# Patient Record
Sex: FEMALE | Race: WHITE | Employment: OTHER | URBAN - METROPOLITAN AREA
[De-identification: names, ages, dates, MRNs, and addresses within clinical notes are randomized per-mention and may not be internally consistent; named-entity substitution may affect disease eponyms.]

---

## 2018-03-15 ENCOUNTER — HOSPITAL ENCOUNTER (OUTPATIENT)
Dept: SLEEP CENTER | Age: 63
Discharge: HOME | End: 2018-03-15
Payer: MEDICAID

## 2018-03-15 PROCEDURE — 95810 POLYSOM 6/> YRS 4/> PARAM: CPT

## 2018-03-21 ENCOUNTER — OFFICE VISIT (OUTPATIENT)
Dept: CARDIOLOGY CLINIC | Age: 63
End: 2018-03-21
Payer: MEDICAID

## 2018-03-21 VITALS
DIASTOLIC BLOOD PRESSURE: 60 MMHG | SYSTOLIC BLOOD PRESSURE: 110 MMHG | HEIGHT: 66 IN | WEIGHT: 224 LBS | HEART RATE: 55 BPM | BODY MASS INDEX: 36 KG/M2

## 2018-03-21 DIAGNOSIS — R00.1 SINUS BRADYCARDIA: ICD-10-CM

## 2018-03-21 DIAGNOSIS — I49.3 VENTRICULAR ECTOPY: ICD-10-CM

## 2018-03-21 DIAGNOSIS — R07.9 CHEST PAIN, UNSPECIFIED TYPE: Primary | ICD-10-CM

## 2018-03-21 DIAGNOSIS — I34.1 MITRAL PROLAPSE: ICD-10-CM

## 2018-03-21 PROCEDURE — 93000 ELECTROCARDIOGRAM COMPLETE: CPT | Performed by: NURSE PRACTITIONER

## 2018-03-21 PROCEDURE — 99213 OFFICE O/P EST LOW 20 MIN: CPT | Performed by: NURSE PRACTITIONER

## 2018-03-21 RX ORDER — SUCRALFATE 1 G/1
1 TABLET ORAL 4 TIMES DAILY
COMMUNITY
End: 2019-10-21

## 2018-03-21 NOTE — PROGRESS NOTES
mouth daily       venlafaxine (EFFEXOR XR) 150 MG extended release capsule Take 150 mg by mouth daily       polyethylene glycol (GLYCOLAX) packet Take 17 g by mouth daily as needed for Constipation      bisacodyl (DULCOLAX) 10 MG suppository Place 10 mg rectally daily as needed for Constipation      polyvinyl alcohol (ARTIFICIAL TEARS) 1.4 % ophthalmic solution 1 drop as needed      Bismuth Subsalicylate (PEPTO-BISMOL) 262 MG TABS Take by mouth      calcium carbonate (TUMS) 500 MG chewable tablet Take 1 tablet by mouth daily      acetaminophen (TYLENOL) 325 MG tablet Take 650 mg by mouth every 6 hours as needed for Pain      aluminum & magnesium hydroxide-simethicone (MAALOX) 367-196-78 MG/5ML SUSP suspension Take 5 mLs by mouth every 6 hours as needed for Indigestion      fluPHENAZine HCl (PROLIXIN) 10 MG tablet Take 10 mg by mouth 2 times daily Every morning      nitroGLYCERIN (NITROSTAT) 0.4 MG SL tablet Place 1 tablet under the tongue every 5 minutes as needed for Chest pain 25 tablet 3    gabapentin (NEURONTIN) 100 MG capsule Take 100 mg by mouth 3 times daily       benztropine (COGENTIN) 0.5 MG tablet Take 0.5 mg by mouth 3 times daily       vitamin D (CHOLECALCIFEROL) 1000 UNIT TABS tablet Take 50,000 Units by mouth once a week       Prenatal Vit-Fe Fumarate-FA (PRENATAL PLUS) 27-1 MG TABS Take 1 tablet by mouth daily. 30 tablet 0    levothyroxine (SYNTHROID) 25 MCG tablet TAKE 1 TABLET BY MOUTH DAILY. 30 tablet 4    topiramate (TOPAMAX) 100 MG tablet TAKE 1 TABLET BY MOUTH 2 TIMES DAILY. (Patient taking differently: bid) 60 tablet 4    docusate sodium (DOCQLACE) 100 MG capsule TAKE 1 CAPSULE BY MOUTH DAILY. (Patient taking differently: Take 100 mg by mouth 3 times daily as needed TAKE 1 CAPSULE BY MOUTH DAILY. ) 30 capsule 3    ranitidine (ZANTAC) 150 MG tablet Take 300 mg by mouth nightly       ARIPiprazole (ABILIFY) 30 MG tablet   Take 30 mg by mouth daily       pantoprazole (PROTONIX) Warm and dry, no rashes   Head & Neck:  No JVD. No carotid bruits. Mucous membranes moist.   Chest:   No deformities. Symmetrical and nontender. No respiratory distress   Lungs:   Clear to auscultation bilaterally. Heart:  Normal S1 and S2. No S3 or S4. No Murmur. No gallops no rubs   Abdomen: Soft without organomegaly or masses. Nontender, Bowel sound     normoactive   Extremities:  No edema of lower extremities . No cyanosis and moves all extremities   Neuro:  Alert & orient x 3 no focal deficits     REVIEW OF DIAGNOSTIC TESTS:      EKG today sinus  Bradycardia with low voltage    Lab Results   Component Value Date     08/02/2017     03/01/2017     09/06/2016    K 3.8 08/02/2017    K 4.2 03/01/2017    K 3.8 09/06/2016     08/02/2017     03/01/2017     09/06/2016    CO2 20 08/02/2017    CO2 24 03/01/2017    CO2 22 09/06/2016    BUN 12 08/02/2017    BUN 12 03/01/2017    BUN 15 09/06/2016    CREATININE 1.1 08/02/2017    CREATININE 1.0 03/01/2017    CREATININE 1.0 09/06/2016         Lab Results   Component Value Date    PROBNP 68 09/05/2016         ASSESSMENT / DIAGNOSIS:    Assessment:  1. History of atypical chest pain- with occurrence. No stress-induced ischemia on MPS, 02/17/2016, 3/2017  2. ENNIS, chronic, multifactorial   3. MVP anterior leaflet, mild mitral regurgitation   4. Sinus bradycardia and PVCs history  5. Hypothyroidism  6. Chronic back pain   7. Bipolar disorder   9. History of PE. S/p IVC filter with history of anticoagulation with Coumadin. Coumadin discontinued d/t rectal bleeding. 10. Abnormal EKG  11. Obstructive sleep apnea  12. Obesity  13. Diabetes  14. Sinus bradycardia, no indication for pacemaker     TREATMENT PLAN:   1. Continue current medications   2. Call for exertional chest pain that is consistent   3. Return to the office in one year   4. consider low-dose ACE inhibitor, statin,  Defer to primary care   5.  Avoid negative chronotropic meds    The patient's current medication list, allergies, problem list and results of all previously ordered tests were reviewed at today's visit    [address]  [facility] [address]  [phone]

## 2018-04-09 ENCOUNTER — HOSPITAL ENCOUNTER (OUTPATIENT)
Dept: MRI IMAGING | Age: 63
Discharge: HOME | End: 2018-04-11
Payer: MEDICAID

## 2018-04-09 DIAGNOSIS — M54.5 BILATERAL LOW BACK PAIN, UNSPECIFIED CHRONICITY, WITH SCIATICA PRESENCE UNSPECIFIED: ICD-10-CM

## 2018-04-09 PROCEDURE — 72149 MRI LUMBAR SPINE W/DYE: CPT

## 2018-04-09 PROCEDURE — 6360000004 HC RX CONTRAST MEDICATION: Performed by: RADIOLOGY

## 2018-04-09 PROCEDURE — A9579 GAD-BASE MR CONTRAST NOS,1ML: HCPCS | Performed by: RADIOLOGY

## 2018-04-09 RX ADMIN — GADOTERIDOL 20 ML: 279.3 INJECTION, SOLUTION INTRAVENOUS at 13:08

## 2018-04-11 ENCOUNTER — OFFICE VISIT (OUTPATIENT)
Dept: NEUROLOGY | Age: 63
End: 2018-04-11
Payer: MEDICAID

## 2018-04-11 VITALS
DIASTOLIC BLOOD PRESSURE: 64 MMHG | WEIGHT: 225 LBS | HEART RATE: 56 BPM | OXYGEN SATURATION: 98 % | BODY MASS INDEX: 36.16 KG/M2 | HEIGHT: 66 IN | SYSTOLIC BLOOD PRESSURE: 104 MMHG

## 2018-04-11 DIAGNOSIS — M54.17 L-S RADICULOPATHY: Primary | ICD-10-CM

## 2018-04-11 PROCEDURE — 99214 OFFICE O/P EST MOD 30 MIN: CPT | Performed by: CLINICAL NURSE SPECIALIST

## 2018-04-12 ENCOUNTER — HOSPITAL ENCOUNTER (OUTPATIENT)
Dept: SLEEP CENTER | Age: 63
Discharge: HOME | End: 2018-04-12
Payer: MEDICAID

## 2018-04-12 PROCEDURE — 95811 POLYSOM 6/>YRS CPAP 4/> PARM: CPT

## 2018-04-24 ENCOUNTER — TELEPHONE (OUTPATIENT)
Dept: NEUROSURGERY | Age: 63
End: 2018-04-24

## 2018-04-24 ENCOUNTER — OFFICE VISIT (OUTPATIENT)
Dept: NEUROSURGERY | Age: 63
End: 2018-04-24
Payer: MEDICAID

## 2018-04-24 VITALS
WEIGHT: 226 LBS | SYSTOLIC BLOOD PRESSURE: 118 MMHG | DIASTOLIC BLOOD PRESSURE: 71 MMHG | HEIGHT: 66 IN | BODY MASS INDEX: 36.32 KG/M2 | HEART RATE: 56 BPM

## 2018-04-24 DIAGNOSIS — M43.16 SPONDYLOLISTHESIS AT L3-L4 LEVEL: ICD-10-CM

## 2018-04-24 PROCEDURE — 99203 OFFICE O/P NEW LOW 30 MIN: CPT | Performed by: PHYSICIAN ASSISTANT

## 2018-05-11 ENCOUNTER — HOSPITAL ENCOUNTER (OUTPATIENT)
Dept: MRI IMAGING | Age: 63
Discharge: HOME | End: 2018-05-13
Payer: MEDICAID

## 2018-05-11 ENCOUNTER — HOSPITAL ENCOUNTER (OUTPATIENT)
Dept: GENERAL RADIOLOGY | Age: 63
Discharge: HOME | End: 2018-05-13
Payer: MEDICAID

## 2018-05-11 DIAGNOSIS — M43.16 SPONDYLOLISTHESIS AT L3-L4 LEVEL: ICD-10-CM

## 2018-05-11 PROCEDURE — 72120 X-RAY BEND ONLY L-S SPINE: CPT

## 2018-05-11 PROCEDURE — 73521 X-RAY EXAM HIPS BI 2 VIEWS: CPT

## 2018-05-11 PROCEDURE — 72148 MRI LUMBAR SPINE W/O DYE: CPT

## 2018-05-14 ENCOUNTER — TELEPHONE (OUTPATIENT)
Dept: NEUROSURGERY | Age: 63
End: 2018-05-14

## 2018-05-21 ENCOUNTER — HOSPITAL ENCOUNTER (OUTPATIENT)
Age: 63
Setting detail: OBSERVATION
Discharge: HOME HEALTH | End: 2018-05-22
Attending: EMERGENCY MEDICINE | Admitting: INTERNAL MEDICINE
Payer: MEDICAID

## 2018-05-21 ENCOUNTER — APPOINTMENT (OUTPATIENT)
Dept: GENERAL RADIOLOGY | Age: 63
End: 2018-05-21
Payer: MEDICAID

## 2018-05-21 DIAGNOSIS — R07.9 CHEST PAIN, UNSPECIFIED TYPE: Primary | ICD-10-CM

## 2018-05-21 LAB
ALBUMIN SERPL-MCNC: 3.9 G/DL (ref 3.5–5.2)
ALP BLD-CCNC: 65 U/L (ref 35–104)
ALT SERPL-CCNC: 16 U/L (ref 0–32)
ANION GAP SERPL CALCULATED.3IONS-SCNC: 15 MMOL/L (ref 7–16)
AST SERPL-CCNC: 20 U/L (ref 0–31)
BACTERIA: NORMAL /HPF
BASOPHILS ABSOLUTE: 0.01 E9/L (ref 0–0.2)
BASOPHILS RELATIVE PERCENT: 0.2 % (ref 0–2)
BILIRUB SERPL-MCNC: <0.2 MG/DL (ref 0–1.2)
BILIRUBIN URINE: NEGATIVE
BLOOD, URINE: NEGATIVE
BUN BLDV-MCNC: 13 MG/DL (ref 8–23)
CALCIUM SERPL-MCNC: 9.1 MG/DL (ref 8.6–10.2)
CHLORIDE BLD-SCNC: 107 MMOL/L (ref 98–107)
CLARITY: CLEAR
CO2: 23 MMOL/L (ref 22–29)
COLOR: YELLOW
CREAT SERPL-MCNC: 1 MG/DL (ref 0.5–1)
CRYSTALS, UA: NORMAL
EOSINOPHILS ABSOLUTE: 0.25 E9/L (ref 0.05–0.5)
EOSINOPHILS RELATIVE PERCENT: 4 % (ref 0–6)
EPITHELIAL CELLS, UA: NORMAL /HPF
GFR AFRICAN AMERICAN: >60
GFR NON-AFRICAN AMERICAN: 56 ML/MIN/1.73
GLUCOSE BLD-MCNC: 103 MG/DL (ref 74–109)
GLUCOSE URINE: NEGATIVE MG/DL
HCT VFR BLD CALC: 35.9 % (ref 34–48)
HEMOGLOBIN: 12.2 G/DL (ref 11.5–15.5)
IMMATURE GRANULOCYTES #: 0.02 E9/L
IMMATURE GRANULOCYTES %: 0.3 % (ref 0–5)
KETONES, URINE: NEGATIVE MG/DL
LEUKOCYTE ESTERASE, URINE: ABNORMAL
LYMPHOCYTES ABSOLUTE: 1.5 E9/L (ref 1.5–4)
LYMPHOCYTES RELATIVE PERCENT: 24 % (ref 20–42)
MCH RBC QN AUTO: 30.9 PG (ref 26–35)
MCHC RBC AUTO-ENTMCNC: 34 % (ref 32–34.5)
MCV RBC AUTO: 90.9 FL (ref 80–99.9)
MONOCYTES ABSOLUTE: 0.52 E9/L (ref 0.1–0.95)
MONOCYTES RELATIVE PERCENT: 8.3 % (ref 2–12)
NEUTROPHILS ABSOLUTE: 3.94 E9/L (ref 1.8–7.3)
NEUTROPHILS RELATIVE PERCENT: 63.2 % (ref 43–80)
NITRITE, URINE: NEGATIVE
PDW BLD-RTO: 13.6 FL (ref 11.5–15)
PH UA: 6 (ref 5–9)
PLATELET # BLD: 123 E9/L (ref 130–450)
PMV BLD AUTO: 10.8 FL (ref 7–12)
POTASSIUM SERPL-SCNC: 3.8 MMOL/L (ref 3.5–5)
PROTEIN UA: NEGATIVE MG/DL
RBC # BLD: 3.95 E12/L (ref 3.5–5.5)
RBC UA: NORMAL /HPF (ref 0–2)
SODIUM BLD-SCNC: 145 MMOL/L (ref 132–146)
SPECIFIC GRAVITY UA: 1.01 (ref 1–1.03)
TOTAL PROTEIN: 6.4 G/DL (ref 6.4–8.3)
TROPONIN: <0.01 NG/ML (ref 0–0.03)
TROPONIN: <0.01 NG/ML (ref 0–0.03)
UROBILINOGEN, URINE: 0.2 E.U./DL
WBC # BLD: 6.2 E9/L (ref 4.5–11.5)
WBC UA: NORMAL /HPF (ref 0–5)

## 2018-05-21 PROCEDURE — 99285 EMERGENCY DEPT VISIT HI MDM: CPT

## 2018-05-21 PROCEDURE — 84484 ASSAY OF TROPONIN QUANT: CPT

## 2018-05-21 PROCEDURE — G0378 HOSPITAL OBSERVATION PER HR: HCPCS

## 2018-05-21 PROCEDURE — 71045 X-RAY EXAM CHEST 1 VIEW: CPT

## 2018-05-21 PROCEDURE — 6360000002 HC RX W HCPCS: Performed by: FAMILY MEDICINE

## 2018-05-21 PROCEDURE — 6370000000 HC RX 637 (ALT 250 FOR IP): Performed by: FAMILY MEDICINE

## 2018-05-21 PROCEDURE — 94640 AIRWAY INHALATION TREATMENT: CPT

## 2018-05-21 PROCEDURE — 81001 URINALYSIS AUTO W/SCOPE: CPT

## 2018-05-21 PROCEDURE — 80053 COMPREHEN METABOLIC PANEL: CPT

## 2018-05-21 PROCEDURE — 85025 COMPLETE CBC W/AUTO DIFF WBC: CPT

## 2018-05-21 PROCEDURE — 87088 URINE BACTERIA CULTURE: CPT

## 2018-05-21 PROCEDURE — 2580000003 HC RX 258: Performed by: INTERNAL MEDICINE

## 2018-05-21 PROCEDURE — 36415 COLL VENOUS BLD VENIPUNCTURE: CPT

## 2018-05-21 PROCEDURE — 94761 N-INVAS EAR/PLS OXIMETRY MLT: CPT

## 2018-05-21 RX ORDER — VENLAFAXINE HYDROCHLORIDE 150 MG/1
150 CAPSULE, EXTENDED RELEASE ORAL DAILY
Status: DISCONTINUED | OUTPATIENT
Start: 2018-05-21 | End: 2018-05-22 | Stop reason: HOSPADM

## 2018-05-21 RX ORDER — FLUPHENAZINE HYDROCHLORIDE 5 MG/1
10 TABLET ORAL EVERY EVENING
Status: DISCONTINUED | OUTPATIENT
Start: 2018-05-21 | End: 2018-05-21

## 2018-05-21 RX ORDER — FAMOTIDINE 20 MG/1
20 TABLET, FILM COATED ORAL DAILY
Status: DISCONTINUED | OUTPATIENT
Start: 2018-05-21 | End: 2018-05-22 | Stop reason: HOSPADM

## 2018-05-21 RX ORDER — NITROGLYCERIN 0.4 MG/1
0.4 TABLET SUBLINGUAL EVERY 5 MIN PRN
Status: DISCONTINUED | OUTPATIENT
Start: 2018-05-21 | End: 2018-05-22 | Stop reason: HOSPADM

## 2018-05-21 RX ORDER — LANOLIN ALCOHOL/MO/W.PET/CERES
3 CREAM (GRAM) TOPICAL NIGHTLY PRN
Status: DISCONTINUED | OUTPATIENT
Start: 2018-05-21 | End: 2018-05-22 | Stop reason: HOSPADM

## 2018-05-21 RX ORDER — POLYETHYLENE GLYCOL 3350 17 G/17G
17 POWDER, FOR SOLUTION ORAL DAILY PRN
COMMUNITY
End: 2019-03-15 | Stop reason: ALTCHOICE

## 2018-05-21 RX ORDER — POLYVINYL ALCOHOL 14 MG/ML
1 SOLUTION/ DROPS OPHTHALMIC PRN
Status: DISCONTINUED | OUTPATIENT
Start: 2018-05-21 | End: 2018-05-22 | Stop reason: HOSPADM

## 2018-05-21 RX ORDER — SODIUM CHLORIDE 0.9 % (FLUSH) 0.9 %
10 SYRINGE (ML) INJECTION PRN
Status: DISCONTINUED | OUTPATIENT
Start: 2018-05-21 | End: 2018-05-22 | Stop reason: HOSPADM

## 2018-05-21 RX ORDER — ACETAMINOPHEN 325 MG/1
650 TABLET ORAL EVERY 6 HOURS PRN
Status: DISCONTINUED | OUTPATIENT
Start: 2018-05-21 | End: 2018-05-21

## 2018-05-21 RX ORDER — ACETAMINOPHEN 325 MG/1
650 TABLET ORAL EVERY 4 HOURS PRN
Status: DISCONTINUED | OUTPATIENT
Start: 2018-05-21 | End: 2018-05-21 | Stop reason: SDUPTHER

## 2018-05-21 RX ORDER — ACETAMINOPHEN 325 MG/1
650 TABLET ORAL EVERY 4 HOURS PRN
Status: DISCONTINUED | OUTPATIENT
Start: 2018-05-21 | End: 2018-05-22 | Stop reason: HOSPADM

## 2018-05-21 RX ORDER — ALBUTEROL SULFATE 2.5 MG/3ML
2.5 SOLUTION RESPIRATORY (INHALATION) 2 TIMES DAILY
Status: DISCONTINUED | OUTPATIENT
Start: 2018-05-21 | End: 2018-05-22 | Stop reason: HOSPADM

## 2018-05-21 RX ORDER — ALBUTEROL SULFATE 2.5 MG/3ML
2.5 SOLUTION RESPIRATORY (INHALATION) EVERY 6 HOURS PRN
COMMUNITY
End: 2018-06-01 | Stop reason: SDUPTHER

## 2018-05-21 RX ORDER — GABAPENTIN 100 MG/1
100 CAPSULE ORAL 3 TIMES DAILY
Status: DISCONTINUED | OUTPATIENT
Start: 2018-05-21 | End: 2018-05-22 | Stop reason: HOSPADM

## 2018-05-21 RX ORDER — SODIUM CHLORIDE 0.9 % (FLUSH) 0.9 %
10 SYRINGE (ML) INJECTION EVERY 12 HOURS SCHEDULED
Status: DISCONTINUED | OUTPATIENT
Start: 2018-05-21 | End: 2018-05-22 | Stop reason: HOSPADM

## 2018-05-21 RX ORDER — ONDANSETRON 2 MG/ML
4 INJECTION INTRAMUSCULAR; INTRAVENOUS EVERY 6 HOURS PRN
Status: DISCONTINUED | OUTPATIENT
Start: 2018-05-21 | End: 2018-05-22 | Stop reason: HOSPADM

## 2018-05-21 RX ORDER — TOPIRAMATE 100 MG/1
100 TABLET, FILM COATED ORAL 2 TIMES DAILY
Status: DISCONTINUED | OUTPATIENT
Start: 2018-05-21 | End: 2018-05-22 | Stop reason: HOSPADM

## 2018-05-21 RX ORDER — LANOLIN ALCOHOL/MO/W.PET/CERES
50 CREAM (GRAM) TOPICAL DAILY
Status: DISCONTINUED | OUTPATIENT
Start: 2018-05-21 | End: 2018-05-22 | Stop reason: HOSPADM

## 2018-05-21 RX ORDER — FLUPHENAZINE HYDROCHLORIDE 10 MG/1
25 TABLET ORAL NIGHTLY
COMMUNITY
End: 2018-06-01 | Stop reason: DRUGHIGH

## 2018-05-21 RX ORDER — LEVOTHYROXINE SODIUM 0.03 MG/1
25 TABLET ORAL DAILY
Status: DISCONTINUED | OUTPATIENT
Start: 2018-05-21 | End: 2018-05-22 | Stop reason: HOSPADM

## 2018-05-21 RX ORDER — ASPIRIN 81 MG/1
81 TABLET, CHEWABLE ORAL DAILY
Status: DISCONTINUED | OUTPATIENT
Start: 2018-05-21 | End: 2018-05-22 | Stop reason: HOSPADM

## 2018-05-21 RX ORDER — PANTOPRAZOLE SODIUM 40 MG/1
40 TABLET, DELAYED RELEASE ORAL DAILY
Status: DISCONTINUED | OUTPATIENT
Start: 2018-05-21 | End: 2018-05-22 | Stop reason: HOSPADM

## 2018-05-21 RX ORDER — FLUPHENAZINE HYDROCHLORIDE 5 MG/1
25 TABLET ORAL NIGHTLY
Status: DISCONTINUED | OUTPATIENT
Start: 2018-05-21 | End: 2018-05-22 | Stop reason: CLARIF

## 2018-05-21 RX ORDER — BENZTROPINE MESYLATE 0.5 MG/1
0.5 TABLET ORAL 3 TIMES DAILY
Status: DISCONTINUED | OUTPATIENT
Start: 2018-05-21 | End: 2018-05-22 | Stop reason: HOSPADM

## 2018-05-21 RX ORDER — OXYBUTYNIN CHLORIDE 5 MG/1
5 TABLET ORAL 3 TIMES DAILY
Status: DISCONTINUED | OUTPATIENT
Start: 2018-05-21 | End: 2018-05-22 | Stop reason: HOSPADM

## 2018-05-21 RX ORDER — ALBUTEROL SULFATE 90 UG/1
2 AEROSOL, METERED RESPIRATORY (INHALATION) EVERY 6 HOURS PRN
COMMUNITY
End: 2020-06-09

## 2018-05-21 RX ORDER — FLUPHENAZINE HYDROCHLORIDE 5 MG/1
10 TABLET ORAL DAILY
Status: DISCONTINUED | OUTPATIENT
Start: 2018-05-22 | End: 2018-05-22 | Stop reason: CLARIF

## 2018-05-21 RX ORDER — BISACODYL 10 MG
10 SUPPOSITORY, RECTAL RECTAL DAILY PRN
Status: DISCONTINUED | OUTPATIENT
Start: 2018-05-21 | End: 2018-05-22 | Stop reason: HOSPADM

## 2018-05-21 RX ORDER — LORAZEPAM 0.5 MG/1
0.5 TABLET ORAL 2 TIMES DAILY
Status: DISCONTINUED | OUTPATIENT
Start: 2018-05-21 | End: 2018-05-22 | Stop reason: HOSPADM

## 2018-05-21 RX ADMIN — FLUPHENAZINE HYDROCHLORIDE 25 MG: 5 TABLET ORAL at 23:29

## 2018-05-21 RX ADMIN — ALBUTEROL SULFATE 2.5 MG: 2.5 SOLUTION RESPIRATORY (INHALATION) at 18:12

## 2018-05-21 RX ADMIN — LORAZEPAM 0.5 MG: 0.5 TABLET ORAL at 22:24

## 2018-05-21 RX ADMIN — Medication 10 ML: at 22:28

## 2018-05-21 RX ADMIN — BENZTROPINE MESYLATE 0.5 MG: 0.5 TABLET ORAL at 22:25

## 2018-05-21 RX ADMIN — Medication 15 ML: at 22:24

## 2018-05-21 RX ADMIN — GABAPENTIN 100 MG: 100 CAPSULE ORAL at 22:24

## 2018-05-21 RX ADMIN — TOPIRAMATE 100 MG: 100 TABLET, FILM COATED ORAL at 22:24

## 2018-05-21 RX ADMIN — ACETAMINOPHEN 650 MG: 325 TABLET, FILM COATED ORAL at 23:31

## 2018-05-21 RX ADMIN — Medication 1 DROP: at 22:27

## 2018-05-21 RX ADMIN — OXYBUTYNIN CHLORIDE 5 MG: 5 TABLET ORAL at 22:25

## 2018-05-21 ASSESSMENT — ENCOUNTER SYMPTOMS
NAUSEA: 0
ABDOMINAL PAIN: 0
SHORTNESS OF BREATH: 1
EYE REDNESS: 0
VOMITING: 0

## 2018-05-21 ASSESSMENT — PAIN DESCRIPTION - LOCATION
LOCATION: CHEST
LOCATION: FLANK

## 2018-05-21 ASSESSMENT — PAIN SCALES - GENERAL
PAINLEVEL_OUTOF10: 0
PAINLEVEL_OUTOF10: 0
PAINLEVEL_OUTOF10: 5
PAINLEVEL_OUTOF10: 9

## 2018-05-21 ASSESSMENT — PAIN DESCRIPTION - ORIENTATION
ORIENTATION: RIGHT
ORIENTATION: LEFT

## 2018-05-21 ASSESSMENT — PAIN DESCRIPTION - DESCRIPTORS
DESCRIPTORS: PRESSURE;HEAVINESS;ACHING
DESCRIPTORS: ACHING;DULL;DISCOMFORT

## 2018-05-21 ASSESSMENT — PAIN DESCRIPTION - PAIN TYPE
TYPE: ACUTE PAIN
TYPE: ACUTE PAIN

## 2018-05-21 ASSESSMENT — PAIN DESCRIPTION - ONSET
ONSET: ON-GOING
ONSET: ON-GOING

## 2018-05-21 ASSESSMENT — PAIN DESCRIPTION - FREQUENCY
FREQUENCY: INTERMITTENT
FREQUENCY: INTERMITTENT

## 2018-05-21 ASSESSMENT — PAIN DESCRIPTION - PROGRESSION: CLINICAL_PROGRESSION: GRADUALLY WORSENING

## 2018-05-22 ENCOUNTER — APPOINTMENT (OUTPATIENT)
Dept: NUCLEAR MEDICINE | Age: 63
End: 2018-05-22
Payer: MEDICAID

## 2018-05-22 VITALS
BODY MASS INDEX: 37 KG/M2 | TEMPERATURE: 98.1 F | HEIGHT: 66 IN | DIASTOLIC BLOOD PRESSURE: 52 MMHG | WEIGHT: 230.2 LBS | HEART RATE: 52 BPM | RESPIRATION RATE: 16 BRPM | OXYGEN SATURATION: 97 % | SYSTOLIC BLOOD PRESSURE: 108 MMHG

## 2018-05-22 LAB
LV EF: 53 %
LV EF: 64 %
LVEF MODALITY: NORMAL
LVEF MODALITY: NORMAL
TROPONIN: <0.01 NG/ML (ref 0–0.03)

## 2018-05-22 PROCEDURE — G0378 HOSPITAL OBSERVATION PER HR: HCPCS

## 2018-05-22 PROCEDURE — 6360000002 HC RX W HCPCS: Performed by: INTERNAL MEDICINE

## 2018-05-22 PROCEDURE — 93306 TTE W/DOPPLER COMPLETE: CPT

## 2018-05-22 PROCEDURE — 93017 CV STRESS TEST TRACING ONLY: CPT

## 2018-05-22 PROCEDURE — 3430000000 HC RX DIAGNOSTIC RADIOPHARMACEUTICAL: Performed by: RADIOLOGY

## 2018-05-22 PROCEDURE — 78452 HT MUSCLE IMAGE SPECT MULT: CPT

## 2018-05-22 PROCEDURE — 96372 THER/PROPH/DIAG INJ SC/IM: CPT

## 2018-05-22 PROCEDURE — 94640 AIRWAY INHALATION TREATMENT: CPT

## 2018-05-22 PROCEDURE — 6370000000 HC RX 637 (ALT 250 FOR IP): Performed by: FAMILY MEDICINE

## 2018-05-22 PROCEDURE — A9500 TC99M SESTAMIBI: HCPCS | Performed by: RADIOLOGY

## 2018-05-22 PROCEDURE — 2580000003 HC RX 258: Performed by: INTERNAL MEDICINE

## 2018-05-22 RX ORDER — FLUPHENAZINE HYDROCHLORIDE 10 MG/1
10 TABLET ORAL DAILY
Status: DISCONTINUED | OUTPATIENT
Start: 2018-05-22 | End: 2018-05-22 | Stop reason: HOSPADM

## 2018-05-22 RX ORDER — FLUPHENAZINE HYDROCHLORIDE 10 MG/1
25 TABLET ORAL NIGHTLY
Status: DISCONTINUED | OUTPATIENT
Start: 2018-05-22 | End: 2018-05-22 | Stop reason: HOSPADM

## 2018-05-22 RX ADMIN — METFORMIN HYDROCHLORIDE 500 MG: 500 TABLET, FILM COATED ORAL at 13:13

## 2018-05-22 RX ADMIN — PANTOPRAZOLE SODIUM 40 MG: 40 TABLET, DELAYED RELEASE ORAL at 13:13

## 2018-05-22 RX ADMIN — GABAPENTIN 100 MG: 100 CAPSULE ORAL at 13:13

## 2018-05-22 RX ADMIN — OXYBUTYNIN CHLORIDE 5 MG: 5 TABLET ORAL at 13:14

## 2018-05-22 RX ADMIN — Medication 1 DROP: at 13:15

## 2018-05-22 RX ADMIN — Medication 10 MILLICURIE: at 07:22

## 2018-05-22 RX ADMIN — Medication 10 ML: at 13:20

## 2018-05-22 RX ADMIN — PYRIDOXINE HCL TAB 50 MG 50 MG: 50 TAB at 13:14

## 2018-05-22 RX ADMIN — BENZTROPINE MESYLATE 0.5 MG: 0.5 TABLET ORAL at 13:14

## 2018-05-22 RX ADMIN — ENOXAPARIN SODIUM 40 MG: 40 INJECTION SUBCUTANEOUS at 13:12

## 2018-05-22 RX ADMIN — ASPIRIN 81 MG 81 MG: 81 TABLET ORAL at 13:13

## 2018-05-22 RX ADMIN — FAMOTIDINE 20 MG: 20 TABLET, FILM COATED ORAL at 13:13

## 2018-05-22 RX ADMIN — ALBUTEROL SULFATE 2.5 MG: 2.5 SOLUTION RESPIRATORY (INHALATION) at 06:49

## 2018-05-22 RX ADMIN — FLUPHENAZINE HYDROCHLORIDE 10 MG: 10 TABLET ORAL at 13:15

## 2018-05-22 RX ADMIN — Medication 30 MILLICURIE: at 11:11

## 2018-05-22 RX ADMIN — VENLAFAXINE HYDROCHLORIDE 150 MG: 150 CAPSULE, EXTENDED RELEASE ORAL at 13:13

## 2018-05-22 RX ADMIN — LORAZEPAM 0.5 MG: 0.5 TABLET ORAL at 13:12

## 2018-05-22 RX ADMIN — REGADENOSON 0.4 MG: 0.08 INJECTION, SOLUTION INTRAVENOUS at 11:10

## 2018-05-22 RX ADMIN — TOPIRAMATE 100 MG: 100 TABLET, FILM COATED ORAL at 13:14

## 2018-05-22 ASSESSMENT — PAIN SCALES - GENERAL
PAINLEVEL_OUTOF10: 0

## 2018-05-23 ENCOUNTER — OFFICE VISIT (OUTPATIENT)
Dept: NEUROSURGERY | Age: 63
End: 2018-05-23
Payer: MEDICAID

## 2018-05-23 VITALS
SYSTOLIC BLOOD PRESSURE: 118 MMHG | WEIGHT: 230 LBS | HEART RATE: 63 BPM | BODY MASS INDEX: 36.96 KG/M2 | HEIGHT: 66 IN | DIASTOLIC BLOOD PRESSURE: 68 MMHG

## 2018-05-23 DIAGNOSIS — G89.29 CHRONIC MIDLINE LOW BACK PAIN WITH RIGHT-SIDED SCIATICA: Primary | ICD-10-CM

## 2018-05-23 DIAGNOSIS — M54.41 CHRONIC MIDLINE LOW BACK PAIN WITH RIGHT-SIDED SCIATICA: Primary | ICD-10-CM

## 2018-05-23 LAB — URINE CULTURE, ROUTINE: NORMAL

## 2018-05-23 PROCEDURE — 99213 OFFICE O/P EST LOW 20 MIN: CPT | Performed by: NEUROLOGICAL SURGERY

## 2018-05-24 ENCOUNTER — TELEPHONE (OUTPATIENT)
Dept: NEUROSURGERY | Age: 63
End: 2018-05-24

## 2018-06-01 ENCOUNTER — PREP FOR PROCEDURE (OUTPATIENT)
Dept: NEUROSURGERY | Age: 63
End: 2018-06-01

## 2018-06-01 ENCOUNTER — OFFICE VISIT (OUTPATIENT)
Dept: CARDIOLOGY CLINIC | Age: 63
End: 2018-06-01
Payer: MEDICAID

## 2018-06-01 VITALS
DIASTOLIC BLOOD PRESSURE: 74 MMHG | SYSTOLIC BLOOD PRESSURE: 130 MMHG | HEIGHT: 66 IN | HEART RATE: 51 BPM | WEIGHT: 230 LBS | BODY MASS INDEX: 36.96 KG/M2

## 2018-06-01 DIAGNOSIS — R07.89 OTHER CHEST PAIN: ICD-10-CM

## 2018-06-01 DIAGNOSIS — M48.061 DEGENERATIVE LUMBAR SPINAL STENOSIS: Primary | ICD-10-CM

## 2018-06-01 DIAGNOSIS — Z01.818 PREOPERATIVE CLEARANCE: Primary | ICD-10-CM

## 2018-06-01 DIAGNOSIS — R00.1 BRADYCARDIA: ICD-10-CM

## 2018-06-01 PROCEDURE — 99213 OFFICE O/P EST LOW 20 MIN: CPT | Performed by: NURSE PRACTITIONER

## 2018-06-01 PROCEDURE — 93000 ELECTROCARDIOGRAM COMPLETE: CPT | Performed by: INTERNAL MEDICINE

## 2018-06-01 RX ORDER — CHOLECALCIFEROL (VITAMIN D3) 1250 MCG
CAPSULE ORAL WEEKLY
COMMUNITY
End: 2019-03-15 | Stop reason: ALTCHOICE

## 2018-06-01 RX ORDER — ARIPIPRAZOLE 30 MG/1
30 TABLET ORAL DAILY
COMMUNITY
End: 2020-06-09 | Stop reason: ALTCHOICE

## 2018-06-01 RX ORDER — SODIUM CHLORIDE 0.9 % (FLUSH) 0.9 %
10 SYRINGE (ML) INJECTION PRN
Status: CANCELLED | OUTPATIENT
Start: 2018-06-01

## 2018-06-01 RX ORDER — SODIUM CHLORIDE 9 MG/ML
INJECTION, SOLUTION INTRAVENOUS CONTINUOUS
Status: CANCELLED | OUTPATIENT
Start: 2018-06-01

## 2018-06-01 RX ORDER — SODIUM CHLORIDE 0.9 % (FLUSH) 0.9 %
10 SYRINGE (ML) INJECTION EVERY 12 HOURS SCHEDULED
Status: CANCELLED | OUTPATIENT
Start: 2018-06-01

## 2018-06-11 ENCOUNTER — TELEPHONE (OUTPATIENT)
Dept: ENT CLINIC | Age: 63
End: 2018-06-11

## 2018-06-20 ENCOUNTER — PROCEDURE VISIT (OUTPATIENT)
Dept: AUDIOLOGY | Age: 63
End: 2018-06-20
Payer: MEDICAID

## 2018-06-20 ENCOUNTER — OFFICE VISIT (OUTPATIENT)
Dept: ENT CLINIC | Age: 63
End: 2018-06-20
Payer: MEDICAID

## 2018-06-20 VITALS
SYSTOLIC BLOOD PRESSURE: 120 MMHG | HEART RATE: 53 BPM | WEIGHT: 230.9 LBS | BODY MASS INDEX: 37.11 KG/M2 | HEIGHT: 66 IN | DIASTOLIC BLOOD PRESSURE: 65 MMHG

## 2018-06-20 DIAGNOSIS — H93.8X3 EAR PRESSURE, BILATERAL: Primary | ICD-10-CM

## 2018-06-20 DIAGNOSIS — H90.3 SENSORINEURAL HEARING LOSS (SNHL), BILATERAL: Primary | ICD-10-CM

## 2018-06-20 DIAGNOSIS — L29.9 ITCHING OF EAR: ICD-10-CM

## 2018-06-20 PROCEDURE — 99213 OFFICE O/P EST LOW 20 MIN: CPT | Performed by: OTOLARYNGOLOGY

## 2018-06-20 PROCEDURE — 92567 TYMPANOMETRY: CPT | Performed by: AUDIOLOGIST

## 2018-06-20 ASSESSMENT — ENCOUNTER SYMPTOMS
SHORTNESS OF BREATH: 0
COUGH: 0
DOUBLE VISION: 0
BLURRED VISION: 0
VOMITING: 0
HEARTBURN: 0

## 2018-06-21 ENCOUNTER — TELEPHONE (OUTPATIENT)
Dept: ENT CLINIC | Age: 63
End: 2018-06-21

## 2018-06-25 LAB
EKG ATRIAL RATE: 68 BPM
EKG P AXIS: 38 DEGREES
EKG P-R INTERVAL: 184 MS
EKG Q-T INTERVAL: 416 MS
EKG QRS DURATION: 94 MS
EKG QTC CALCULATION (BAZETT): 442 MS
EKG R AXIS: -31 DEGREES
EKG T AXIS: -7 DEGREES
EKG VENTRICULAR RATE: 68 BPM

## 2018-07-09 RX ORDER — FLUPHENAZINE HYDROCHLORIDE 10 MG/1
20 TABLET ORAL 2 TIMES DAILY
COMMUNITY

## 2018-07-09 RX ORDER — ACETAMINOPHEN 325 MG/1
650 TABLET ORAL EVERY 4 HOURS PRN
Status: ON HOLD | COMMUNITY
End: 2018-07-19 | Stop reason: HOSPADM

## 2018-07-09 RX ORDER — CALCIUM CARBONATE 200(500)MG
2 TABLET,CHEWABLE ORAL 4 TIMES DAILY PRN
COMMUNITY

## 2018-07-09 RX ORDER — NICOTINE POLACRILEX 4 MG
15 LOZENGE BUCCAL SEE ADMIN INSTRUCTIONS
COMMUNITY
End: 2019-03-15 | Stop reason: ALTCHOICE

## 2018-07-09 NOTE — PROGRESS NOTES
I TALKED TO [address]. ABE IS NOT IN ISOLATION,NO TRACH, VENT OR OPEN OR DRAINING WOUNDS. SHE SIGNS FOR HERSELF. [facility] WILL FAX OVER HT,WT,MEDICATIONS, PROBLEM LIST, LIVING WILL INFO AND WE WILL FAX OVER INSTRUCTIONS ONCE INFO IS RECEIVED. ALSO WILL FAX OVER LAB TESTS NEEDED FOR OR, EKG,CHEST ETC. INFO FROM [employer] RECEIVED AND REVIEWED. INSTRUCTIONS FAXED TO [facility] [employer]. NOTED FOR THEM TO CHECK ON ASPIRIN WITH SURGEON. LEFT MESSAGE ON [employer] VOICEMAIL THAT INSTRUCTIONS WERE SENT.  ASKED THAT THEY CALL BACK IF NOT RECEIVED OR IF ANY QUESTIONS

## 2018-07-10 ENCOUNTER — OFFICE VISIT (OUTPATIENT)
Dept: NEUROLOGY | Age: 63
End: 2018-07-10
Payer: MEDICAID

## 2018-07-10 VITALS
HEART RATE: 53 BPM | HEIGHT: 66 IN | SYSTOLIC BLOOD PRESSURE: 99 MMHG | OXYGEN SATURATION: 95 % | WEIGHT: 228 LBS | BODY MASS INDEX: 36.64 KG/M2 | DIASTOLIC BLOOD PRESSURE: 69 MMHG

## 2018-07-10 DIAGNOSIS — R41.3 MEMORY DIFFICULTIES: ICD-10-CM

## 2018-07-10 DIAGNOSIS — M54.17 L-S RADICULOPATHY: Primary | ICD-10-CM

## 2018-07-10 PROCEDURE — 99214 OFFICE O/P EST MOD 30 MIN: CPT | Performed by: CLINICAL NURSE SPECIALIST

## 2018-07-10 NOTE — PROGRESS NOTES
procedure, you may call the pre-op area if you have concerns about your blood sugar [phone]. [] Use your inhalers the morning of surgery. Bring your emergency inhaler with you day of surgery. [x] Follow physician instructions regarding any blood thinners you may be taking. WHAT TO EXPECT:  [x] The day of surgery you will be greeted and  checked in by the [employer] .  A nurse will greet you in accordance to the time you are needed in the pre-op area to prepare you for surgery. Please do not be discouraged if you are not greeted in the order you arrive as there are many variables that are involved in patient preparation. Your patience is greatly appreciated as you wait for your nurse. Please bring in items such as: books, magazines, newspapers, electronics, or any other items  to occupy your time in the waiting area. [x]  Delays may occur with surgery and staff will make a sincere effort to keep you informed of delays. If any delays occur with your procedure, we apologize ahead of time for your inconvenience as we recognize the value of your time.

## 2018-07-10 NOTE — PROGRESS NOTES
MD   benztropine (COGENTIN) 0.5 MG tablet Take 0.5 mg by mouth 3 times daily  Yes Historical Provider, MD   levothyroxine (SYNTHROID) 25 MCG tablet TAKE 1 TABLET BY MOUTH DAILY. Yes Cecily Lowery,    topiramate (TOPAMAX) 100 MG tablet TAKE 1 TABLET BY MOUTH 2 TIMES DAILY. Patient taking differently: bid Yes Delano Garay, DO   docusate sodium (DOCQLACE) 100 MG capsule TAKE 1 CAPSULE BY MOUTH DAILY. Patient taking differently: Take 100 mg by mouth daily TAKE 1 CAPSULE BY MOUTH DAILY. Yes Sirena Osorio, DO   ranitidine (ZANTAC) 150 MG tablet Take 300 mg by mouth nightly  Yes Historical Provider, MD   pantoprazole (PROTONIX) 40 MG tablet Take 40 mg by mouth daily. Take 1 pill every morning.  Given per Dr. Deena Erazo Yes Historical Provider, MD   glucose (GLUTOSE) 40 % GEL Take 15 g by mouth See Admin Instructions IF BLOOD SUGAR <60  Historical Provider, MD   Zinc Gluconate (COLD-EEZE PO) Take 1 lozenge by mouth every 2 hours as needed TIMES 1 DAY PRN SORE THROAT  Historical Provider, MD   Sodium Phosphates (FLEET) 7-19 GM/118ML Place 1 enema rectally once as needed If no bowel movement in 2 days ( IF NO DULCOLAX SUPP RESULTS)  Historical Provider, MD   Acetaminophen-Codeine (TYLENOL WITH CODEINE #3 PO) Take by mouth Pt takes 2 tabs four times daily as needed (NOT TO EXCEED 4 GMS APAP/24 HOURS  Historical Provider, MD   bisacodyl (DULCOLAX) 10 MG suppository Place 10 mg rectally daily as needed for Constipation IF NO MILK OF MAG RESULTS  Historical Provider, MD     Allergies as of 07/10/2018 - Review Complete 07/10/2018   Allergen Reaction Noted    Penicillins Anaphylaxis 11/21/2003    Demerol Other (See Comments) 01/25/2011    Depakote [divalproex sodium]  04/19/2012    Haldol [haloperidol lactate] Other (See Comments) 01/25/2011    Sulfa antibiotics Nausea And Vomiting 11/21/2003        Objective:     BP 99/69 (Site: Right Arm, Position: Sitting, Cuff Size: Large Adult)   Pulse 53   Ht 5' 6\"

## 2018-07-10 NOTE — PROGRESS NOTES
I TALKED TO DR HERRERA REGARDING PROLIXIN AND COGENTIN FOR DAY OF OR, HE SAID TO HAVE THEM TAKE AM OF OR.

## 2018-07-15 ENCOUNTER — ANESTHESIA EVENT (OUTPATIENT)
Dept: OPERATING ROOM | Age: 63
DRG: 304 | End: 2018-07-15
Payer: MEDICAID

## 2018-07-15 NOTE — H&P
History of Present Illness: This is a 59year old white female who presents with chronic back pain. Hx of lumbar laminectomy in 2009 with Dr. David Talavera, admits to good relief initially. Unable to give a time frame of when pain reoccurred. Patient has an extensive hx of back pain, hip pain, and leg pain with multiple surgeries. Patient is a poor historian when attempting to answer date and symptoms specific questioning with regards to these issues.      C/o 8/10 right sided back pain with isolated right \"femur pain\", bilateral knee pain s/p injections, and right hip pain s/p multiple surgeries. Patient denies true radicular pain. Denies LE n/t. C/o weakness, ambulating with a walker. Back pain worse then leg pain. Has tried PT 1.5 years prior, unsure if it was core/back targeted. States she has seen a Pain specialist, unsure of what type of injections and where they were located.      Patient denies recent follow-up with any of her previous surgeons locally or at [facility]. No family present during consultation.      Allergies:   Penicillins; Demerol; Depakote [divalproex sodium];  Haldol [haloperidol lactate]; and Sulfa antibiotics     Past Medical History:  Past Medical History             Diagnosis Date    Arrhythmia      Bipolar 2 disorder (HCC)      CKD      Constipation      Continuous urine leakage      COPD (chronic obstructive pulmonary disease) (HCC)      Depression      Dysphagia      Environmental allergies      GERD (gastroesophageal reflux disease)      History of cardiovascular stress test 10-     LEXISCAN    History of cardiovascular stress test 06/2014     lexiscan stress    Hyperlipidemia      MDRO (multiple drug resistant organisms) resistance       mrsa 3-4 yrs ago with back surgery    Menopausal symptoms       atrophic vaginitis    Mitral valve prolapse      Nausea & vomiting       with anesthesia    Neurogenic bladder      Obstructive sleep apnea      Overactive bladder      PONV

## 2018-07-16 ENCOUNTER — APPOINTMENT (OUTPATIENT)
Dept: GENERAL RADIOLOGY | Age: 63
DRG: 304 | End: 2018-07-16
Attending: NEUROLOGICAL SURGERY
Payer: MEDICAID

## 2018-07-16 ENCOUNTER — HOSPITAL ENCOUNTER (INPATIENT)
Age: 63
LOS: 3 days | Discharge: NURSING, REHAB OR LONG-TERM CARE | DRG: 304 | End: 2018-07-19
Attending: NEUROLOGICAL SURGERY | Admitting: NEUROLOGICAL SURGERY
Payer: MEDICAID

## 2018-07-16 ENCOUNTER — ANESTHESIA (OUTPATIENT)
Dept: OPERATING ROOM | Age: 63
DRG: 304 | End: 2018-07-16
Payer: MEDICAID

## 2018-07-16 VITALS — TEMPERATURE: 95 F | OXYGEN SATURATION: 100 % | RESPIRATION RATE: 12 BRPM

## 2018-07-16 DIAGNOSIS — Z01.812 PRE-OPERATIVE LABORATORY EXAMINATION: Primary | ICD-10-CM

## 2018-07-16 DIAGNOSIS — M54.5 LOW BACK PAIN, UNSPECIFIED BACK PAIN LATERALITY, UNSPECIFIED CHRONICITY, WITH SCIATICA PRESENCE UNSPECIFIED: ICD-10-CM

## 2018-07-16 DIAGNOSIS — M48.061 DEGENERATIVE LUMBAR SPINAL STENOSIS: ICD-10-CM

## 2018-07-16 PROBLEM — E11.9 TYPE 2 DIABETES MELLITUS, WITHOUT LONG-TERM CURRENT USE OF INSULIN (HCC): Status: ACTIVE | Noted: 2018-07-16

## 2018-07-16 LAB
ABO/RH: NORMAL
ANTIBODY SCREEN: NORMAL
GLUCOSE BLD-MCNC: 123 MG/DL
METER GLUCOSE: 125 MG/DL (ref 70–110)

## 2018-07-16 PROCEDURE — 3E0U0GB INTRODUCTION OF RECOMBINANT BONE MORPHOGENETIC PROTEIN INTO JOINTS, OPEN APPROACH: ICD-10-PCS | Performed by: NEUROLOGICAL SURGERY

## 2018-07-16 PROCEDURE — 22632 ARTHRD PST TQ 1NTRSPC LM EA: CPT | Performed by: PHYSICIAN ASSISTANT

## 2018-07-16 PROCEDURE — 94150 VITAL CAPACITY TEST: CPT

## 2018-07-16 PROCEDURE — 63047 LAM FACETEC & FORAMOT LUMBAR: CPT | Performed by: PHYSICIAN ASSISTANT

## 2018-07-16 PROCEDURE — 2500000003 HC RX 250 WO HCPCS: Performed by: NEUROLOGICAL SURGERY

## 2018-07-16 PROCEDURE — 6360000002 HC RX W HCPCS

## 2018-07-16 PROCEDURE — 63047 LAM FACETEC & FORAMOT LUMBAR: CPT | Performed by: NEUROLOGICAL SURGERY

## 2018-07-16 PROCEDURE — 6360000002 HC RX W HCPCS: Performed by: NURSE ANESTHETIST, CERTIFIED REGISTERED

## 2018-07-16 PROCEDURE — 3209999900 FLUORO FOR SURGICAL PROCEDURES

## 2018-07-16 PROCEDURE — 7100000000 HC PACU RECOVERY - FIRST 15 MIN: Performed by: NEUROLOGICAL SURGERY

## 2018-07-16 PROCEDURE — 2580000003 HC RX 258: Performed by: PHYSICIAN ASSISTANT

## 2018-07-16 PROCEDURE — 86850 RBC ANTIBODY SCREEN: CPT

## 2018-07-16 PROCEDURE — 2720000010 HC SURG SUPPLY STERILE: Performed by: NEUROLOGICAL SURGERY

## 2018-07-16 PROCEDURE — 3600000005 HC SURGERY LEVEL 5 BASE: Performed by: NEUROLOGICAL SURGERY

## 2018-07-16 PROCEDURE — 6370000000 HC RX 637 (ALT 250 FOR IP): Performed by: NEUROLOGICAL SURGERY

## 2018-07-16 PROCEDURE — 86900 BLOOD TYPING SEROLOGIC ABO: CPT

## 2018-07-16 PROCEDURE — 63048 LAM FACETEC &FORAMOT EA ADDL: CPT | Performed by: NEUROLOGICAL SURGERY

## 2018-07-16 PROCEDURE — L8699 PROSTHETIC IMPLANT NOS: HCPCS | Performed by: NEUROLOGICAL SURGERY

## 2018-07-16 PROCEDURE — 95910 NRV CNDJ TEST 7-8 STUDIES: CPT | Performed by: AUDIOLOGIST

## 2018-07-16 PROCEDURE — 22842 INSERT SPINE FIXATION DEVICE: CPT | Performed by: PHYSICIAN ASSISTANT

## 2018-07-16 PROCEDURE — 6360000002 HC RX W HCPCS: Performed by: NEUROLOGICAL SURGERY

## 2018-07-16 PROCEDURE — 2500000003 HC RX 250 WO HCPCS: Performed by: NURSE ANESTHETIST, CERTIFIED REGISTERED

## 2018-07-16 PROCEDURE — 0SG3071 FUSION OF LUMBOSACRAL JOINT WITH AUTOLOGOUS TISSUE SUBSTITUTE, POSTERIOR APPROACH, POSTERIOR COLUMN, OPEN APPROACH: ICD-10-PCS | Performed by: NEUROLOGICAL SURGERY

## 2018-07-16 PROCEDURE — 0SG30AJ FUSION OF LUMBOSACRAL JOINT WITH INTERBODY FUSION DEVICE, POSTERIOR APPROACH, ANTERIOR COLUMN, OPEN APPROACH: ICD-10-PCS | Performed by: NEUROLOGICAL SURGERY

## 2018-07-16 PROCEDURE — 0ST40ZZ RESECTION OF LUMBOSACRAL DISC, OPEN APPROACH: ICD-10-PCS | Performed by: NEUROLOGICAL SURGERY

## 2018-07-16 PROCEDURE — 2580000003 HC RX 258: Performed by: NEUROLOGICAL SURGERY

## 2018-07-16 PROCEDURE — 22842 INSERT SPINE FIXATION DEVICE: CPT | Performed by: NEUROLOGICAL SURGERY

## 2018-07-16 PROCEDURE — 2500000003 HC RX 250 WO HCPCS

## 2018-07-16 PROCEDURE — 7100000001 HC PACU RECOVERY - ADDTL 15 MIN: Performed by: NEUROLOGICAL SURGERY

## 2018-07-16 PROCEDURE — 1200000000 HC SEMI PRIVATE

## 2018-07-16 PROCEDURE — 95940 IONM IN OPERATNG ROOM 15 MIN: CPT | Performed by: AUDIOLOGIST

## 2018-07-16 PROCEDURE — 3700000001 HC ADD 15 MINUTES (ANESTHESIA): Performed by: NEUROLOGICAL SURGERY

## 2018-07-16 PROCEDURE — 2580000003 HC RX 258: Performed by: NURSE ANESTHETIST, CERTIFIED REGISTERED

## 2018-07-16 PROCEDURE — 2580000003 HC RX 258: Performed by: FAMILY MEDICINE

## 2018-07-16 PROCEDURE — 86901 BLOOD TYPING SEROLOGIC RH(D): CPT

## 2018-07-16 PROCEDURE — 22853 INSJ BIOMECHANICAL DEVICE: CPT | Performed by: PHYSICIAN ASSISTANT

## 2018-07-16 PROCEDURE — 63048 LAM FACETEC &FORAMOT EA ADDL: CPT | Performed by: PHYSICIAN ASSISTANT

## 2018-07-16 PROCEDURE — 88304 TISSUE EXAM BY PATHOLOGIST: CPT

## 2018-07-16 PROCEDURE — 22853 INSJ BIOMECHANICAL DEVICE: CPT | Performed by: NEUROLOGICAL SURGERY

## 2018-07-16 PROCEDURE — 36415 COLL VENOUS BLD VENIPUNCTURE: CPT

## 2018-07-16 PROCEDURE — 22632 ARTHRD PST TQ 1NTRSPC LM EA: CPT | Performed by: NEUROLOGICAL SURGERY

## 2018-07-16 PROCEDURE — 82962 GLUCOSE BLOOD TEST: CPT

## 2018-07-16 PROCEDURE — 22630 ARTHRD PST TQ 1NTRSPC LUM: CPT | Performed by: PHYSICIAN ASSISTANT

## 2018-07-16 PROCEDURE — 0ST20ZZ RESECTION OF LUMBAR VERTEBRAL DISC, OPEN APPROACH: ICD-10-PCS | Performed by: NEUROLOGICAL SURGERY

## 2018-07-16 PROCEDURE — C1713 ANCHOR/SCREW BN/BN,TIS/BN: HCPCS | Performed by: NEUROLOGICAL SURGERY

## 2018-07-16 PROCEDURE — 3700000000 HC ANESTHESIA ATTENDED CARE: Performed by: NEUROLOGICAL SURGERY

## 2018-07-16 PROCEDURE — 0SG10AJ FUSION OF 2 OR MORE LUMBAR VERTEBRAL JOINTS WITH INTERBODY FUSION DEVICE, POSTERIOR APPROACH, ANTERIOR COLUMN, OPEN APPROACH: ICD-10-PCS | Performed by: NEUROLOGICAL SURGERY

## 2018-07-16 PROCEDURE — 0SG1071 FUSION OF 2 OR MORE LUMBAR VERTEBRAL JOINTS WITH AUTOLOGOUS TISSUE SUBSTITUTE, POSTERIOR APPROACH, POSTERIOR COLUMN, OPEN APPROACH: ICD-10-PCS | Performed by: NEUROLOGICAL SURGERY

## 2018-07-16 PROCEDURE — 3600000015 HC SURGERY LEVEL 5 ADDTL 15MIN: Performed by: NEUROLOGICAL SURGERY

## 2018-07-16 PROCEDURE — 22630 ARTHRD PST TQ 1NTRSPC LUM: CPT | Performed by: NEUROLOGICAL SURGERY

## 2018-07-16 PROCEDURE — 94664 DEMO&/EVAL PT USE INHALER: CPT

## 2018-07-16 PROCEDURE — 6360000002 HC RX W HCPCS: Performed by: PHYSICIAN ASSISTANT

## 2018-07-16 DEVICE — SCREW 54840006545 MAS 6.5X45 CC
Type: IMPLANTABLE DEVICE | Status: FUNCTIONAL
Brand: CD HORIZON® SPINAL SYSTEM

## 2018-07-16 DEVICE — SCREW 54840006550 MAS 6.5X50 CC
Type: IMPLANTABLE DEVICE | Status: FUNCTIONAL
Brand: CD HORIZON® SPINAL SYSTEM

## 2018-07-16 DEVICE — BONE GRAFT KIT 7510800 INFUSE LARGE II
Type: IMPLANTABLE DEVICE | Status: FUNCTIONAL
Brand: INFUSE® BONE GRAFT

## 2018-07-16 DEVICE — SPACER 2990826 CAPSTONE PEEK 08X26
Type: IMPLANTABLE DEVICE | Status: FUNCTIONAL
Brand: CAPSTONE® SPINAL SYSTEM

## 2018-07-16 DEVICE — SCREW 54840005550 MAS 5.5X50 CC
Type: IMPLANTABLE DEVICE | Status: FUNCTIONAL
Brand: CD HORIZON® SPINAL SYSTEM

## 2018-07-16 DEVICE — SCREW 54840006540 MAS 6.5X40 CC
Type: IMPLANTABLE DEVICE | Status: FUNCTIONAL
Brand: CD HORIZON® SPINAL SYSTEM

## 2018-07-16 DEVICE — ROD 1475501070 4.75 CCM NS CURV 70MM
Type: IMPLANTABLE DEVICE | Status: FUNCTIONAL
Brand: CD HORIZON® SPINAL SYSTEM

## 2018-07-16 DEVICE — SET SCREW 5440030 4.75 TI NS BRK OFF
Type: IMPLANTABLE DEVICE | Status: FUNCTIONAL
Brand: CD HORIZON® SPINAL SYSTEM

## 2018-07-16 DEVICE — GRAFT BNE SUB 30CC 1.7-10MM CANC CHIP MORSELIZED FRZ DRY: Type: IMPLANTABLE DEVICE | Status: FUNCTIONAL

## 2018-07-16 RX ORDER — LOPERAMIDE HYDROCHLORIDE 2 MG/1
2 CAPSULE ORAL PRN
Status: DISCONTINUED | OUTPATIENT
Start: 2018-07-16 | End: 2018-07-19 | Stop reason: HOSPADM

## 2018-07-16 RX ORDER — SODIUM CHLORIDE 0.9 % (FLUSH) 0.9 %
10 SYRINGE (ML) INJECTION PRN
Status: DISCONTINUED | OUTPATIENT
Start: 2018-07-16 | End: 2018-07-16 | Stop reason: HOSPADM

## 2018-07-16 RX ORDER — DOCUSATE SODIUM 100 MG/1
100 CAPSULE, LIQUID FILLED ORAL 2 TIMES DAILY
Status: DISCONTINUED | OUTPATIENT
Start: 2018-07-16 | End: 2018-07-19 | Stop reason: HOSPADM

## 2018-07-16 RX ORDER — NEOSTIGMINE METHYLSULFATE 0.5 MG/ML
INJECTION, SOLUTION INTRAVENOUS PRN
Status: DISCONTINUED | OUTPATIENT
Start: 2018-07-16 | End: 2018-07-16 | Stop reason: SDUPTHER

## 2018-07-16 RX ORDER — FENTANYL CITRATE 50 UG/ML
INJECTION, SOLUTION INTRAMUSCULAR; INTRAVENOUS PRN
Status: DISCONTINUED | OUTPATIENT
Start: 2018-07-16 | End: 2018-07-16 | Stop reason: SDUPTHER

## 2018-07-16 RX ORDER — EPHEDRINE SULFATE/0.9% NACL/PF 50 MG/5 ML
SYRINGE (ML) INTRAVENOUS PRN
Status: DISCONTINUED | OUTPATIENT
Start: 2018-07-16 | End: 2018-07-16 | Stop reason: SDUPTHER

## 2018-07-16 RX ORDER — CYCLOBENZAPRINE HCL 10 MG
10 TABLET ORAL 3 TIMES DAILY PRN
Status: DISCONTINUED | OUTPATIENT
Start: 2018-07-16 | End: 2018-07-19 | Stop reason: HOSPADM

## 2018-07-16 RX ORDER — ALBUTEROL SULFATE 90 UG/1
2 AEROSOL, METERED RESPIRATORY (INHALATION) EVERY 6 HOURS PRN
Status: DISCONTINUED | OUTPATIENT
Start: 2018-07-16 | End: 2018-07-19 | Stop reason: HOSPADM

## 2018-07-16 RX ORDER — HYDROCODONE BITARTRATE AND ACETAMINOPHEN 5; 325 MG/1; MG/1
2 TABLET ORAL EVERY 4 HOURS PRN
Status: DISCONTINUED | OUTPATIENT
Start: 2018-07-16 | End: 2018-07-17

## 2018-07-16 RX ORDER — MORPHINE SULFATE 4 MG/ML
4 INJECTION, SOLUTION INTRAMUSCULAR; INTRAVENOUS
Status: DISCONTINUED | OUTPATIENT
Start: 2018-07-16 | End: 2018-07-19 | Stop reason: HOSPADM

## 2018-07-16 RX ORDER — ACETAMINOPHEN 325 MG/1
650 TABLET ORAL EVERY 4 HOURS PRN
Status: DISCONTINUED | OUTPATIENT
Start: 2018-07-16 | End: 2018-07-19 | Stop reason: HOSPADM

## 2018-07-16 RX ORDER — GABAPENTIN 100 MG/1
100 CAPSULE ORAL 3 TIMES DAILY
Status: DISCONTINUED | OUTPATIENT
Start: 2018-07-16 | End: 2018-07-19 | Stop reason: HOSPADM

## 2018-07-16 RX ORDER — SODIUM CHLORIDE 9 MG/ML
INJECTION, SOLUTION INTRAVENOUS CONTINUOUS
Status: DISCONTINUED | OUTPATIENT
Start: 2018-07-16 | End: 2018-07-16

## 2018-07-16 RX ORDER — SODIUM CHLORIDE 0.9 % (FLUSH) 0.9 %
10 SYRINGE (ML) INJECTION EVERY 12 HOURS SCHEDULED
Status: DISCONTINUED | OUTPATIENT
Start: 2018-07-16 | End: 2018-07-19 | Stop reason: HOSPADM

## 2018-07-16 RX ORDER — FAMOTIDINE 20 MG/1
20 TABLET, FILM COATED ORAL DAILY
Status: DISCONTINUED | OUTPATIENT
Start: 2018-07-16 | End: 2018-07-19 | Stop reason: HOSPADM

## 2018-07-16 RX ORDER — ALBUTEROL SULFATE 2.5 MG/3ML
2.5 SOLUTION RESPIRATORY (INHALATION) 2 TIMES DAILY
Status: DISCONTINUED | OUTPATIENT
Start: 2018-07-16 | End: 2018-07-19 | Stop reason: HOSPADM

## 2018-07-16 RX ORDER — FENTANYL CITRATE 50 UG/ML
25 INJECTION, SOLUTION INTRAMUSCULAR; INTRAVENOUS EVERY 5 MIN PRN
Status: DISCONTINUED | OUTPATIENT
Start: 2018-07-16 | End: 2018-07-16 | Stop reason: HOSPADM

## 2018-07-16 RX ORDER — LORAZEPAM 0.5 MG/1
0.5 TABLET ORAL 2 TIMES DAILY
Status: DISCONTINUED | OUTPATIENT
Start: 2018-07-16 | End: 2018-07-19 | Stop reason: HOSPADM

## 2018-07-16 RX ORDER — VANCOMYCIN HYDROCHLORIDE 500 MG/10ML
INJECTION, POWDER, LYOPHILIZED, FOR SOLUTION INTRAVENOUS PRN
Status: DISCONTINUED | OUTPATIENT
Start: 2018-07-16 | End: 2018-07-16 | Stop reason: HOSPADM

## 2018-07-16 RX ORDER — GLYCOPYRROLATE 1 MG/5 ML
SYRINGE (ML) INTRAVENOUS PRN
Status: DISCONTINUED | OUTPATIENT
Start: 2018-07-16 | End: 2018-07-16 | Stop reason: SDUPTHER

## 2018-07-16 RX ORDER — SODIUM CHLORIDE 0.9 % (FLUSH) 0.9 %
10 SYRINGE (ML) INJECTION PRN
Status: DISCONTINUED | OUTPATIENT
Start: 2018-07-16 | End: 2018-07-19 | Stop reason: HOSPADM

## 2018-07-16 RX ORDER — BUPIVACAINE HYDROCHLORIDE 2.5 MG/ML
INJECTION, SOLUTION EPIDURAL; INFILTRATION; INTRACAUDAL PRN
Status: DISCONTINUED | OUTPATIENT
Start: 2018-07-16 | End: 2018-07-16 | Stop reason: HOSPADM

## 2018-07-16 RX ORDER — MIDAZOLAM HYDROCHLORIDE 1 MG/ML
INJECTION INTRAMUSCULAR; INTRAVENOUS PRN
Status: DISCONTINUED | OUTPATIENT
Start: 2018-07-16 | End: 2018-07-16 | Stop reason: SDUPTHER

## 2018-07-16 RX ORDER — HYDROCODONE BITARTRATE AND ACETAMINOPHEN 5; 325 MG/1; MG/1
1 TABLET ORAL EVERY 4 HOURS PRN
Status: DISCONTINUED | OUTPATIENT
Start: 2018-07-16 | End: 2018-07-17

## 2018-07-16 RX ORDER — MAGNESIUM HYDROXIDE/ALUMINUM HYDROXICE/SIMETHICONE 120; 1200; 1200 MG/30ML; MG/30ML; MG/30ML
30 SUSPENSION ORAL EVERY 4 HOURS PRN
Status: DISCONTINUED | OUTPATIENT
Start: 2018-07-16 | End: 2018-07-19 | Stop reason: HOSPADM

## 2018-07-16 RX ORDER — POLYETHYLENE GLYCOL 3350 17 G/17G
17 POWDER, FOR SOLUTION ORAL DAILY PRN
Status: DISCONTINUED | OUTPATIENT
Start: 2018-07-16 | End: 2018-07-19 | Stop reason: HOSPADM

## 2018-07-16 RX ORDER — ACETAMINOPHEN 325 MG/1
650 TABLET ORAL EVERY 4 HOURS PRN
Status: DISCONTINUED | OUTPATIENT
Start: 2018-07-16 | End: 2018-07-16 | Stop reason: SDUPTHER

## 2018-07-16 RX ORDER — FLUPHENAZINE HYDROCHLORIDE 5 MG/1
10 TABLET ORAL DAILY
Status: DISCONTINUED | OUTPATIENT
Start: 2018-07-16 | End: 2018-07-19 | Stop reason: HOSPADM

## 2018-07-16 RX ORDER — OXYBUTYNIN CHLORIDE 5 MG/1
5 TABLET ORAL 3 TIMES DAILY
Status: DISCONTINUED | OUTPATIENT
Start: 2018-07-16 | End: 2018-07-19 | Stop reason: HOSPADM

## 2018-07-16 RX ORDER — LEVOTHYROXINE SODIUM 0.03 MG/1
25 TABLET ORAL DAILY
Status: DISCONTINUED | OUTPATIENT
Start: 2018-07-16 | End: 2018-07-19 | Stop reason: HOSPADM

## 2018-07-16 RX ORDER — MORPHINE SULFATE 2 MG/ML
2 INJECTION, SOLUTION INTRAMUSCULAR; INTRAVENOUS
Status: DISCONTINUED | OUTPATIENT
Start: 2018-07-16 | End: 2018-07-19 | Stop reason: HOSPADM

## 2018-07-16 RX ORDER — ONDANSETRON 2 MG/ML
4 INJECTION INTRAMUSCULAR; INTRAVENOUS EVERY 6 HOURS PRN
Status: DISCONTINUED | OUTPATIENT
Start: 2018-07-16 | End: 2018-07-19 | Stop reason: HOSPADM

## 2018-07-16 RX ORDER — ROCURONIUM BROMIDE 10 MG/ML
INJECTION, SOLUTION INTRAVENOUS PRN
Status: DISCONTINUED | OUTPATIENT
Start: 2018-07-16 | End: 2018-07-16 | Stop reason: SDUPTHER

## 2018-07-16 RX ORDER — BENZTROPINE MESYLATE 0.5 MG/1
0.5 TABLET ORAL 3 TIMES DAILY
Status: DISCONTINUED | OUTPATIENT
Start: 2018-07-16 | End: 2018-07-19 | Stop reason: HOSPADM

## 2018-07-16 RX ORDER — ONDANSETRON 2 MG/ML
INJECTION INTRAMUSCULAR; INTRAVENOUS PRN
Status: DISCONTINUED | OUTPATIENT
Start: 2018-07-16 | End: 2018-07-16 | Stop reason: SDUPTHER

## 2018-07-16 RX ORDER — LIDOCAINE HYDROCHLORIDE AND EPINEPHRINE 5; 5 MG/ML; UG/ML
INJECTION, SOLUTION INFILTRATION; PERINEURAL PRN
Status: DISCONTINUED | OUTPATIENT
Start: 2018-07-16 | End: 2018-07-16 | Stop reason: HOSPADM

## 2018-07-16 RX ORDER — FENTANYL CITRATE 50 UG/ML
50 INJECTION, SOLUTION INTRAMUSCULAR; INTRAVENOUS EVERY 5 MIN PRN
Status: DISCONTINUED | OUTPATIENT
Start: 2018-07-16 | End: 2018-07-16 | Stop reason: HOSPADM

## 2018-07-16 RX ORDER — PROPOFOL 10 MG/ML
INJECTION, EMULSION INTRAVENOUS PRN
Status: DISCONTINUED | OUTPATIENT
Start: 2018-07-16 | End: 2018-07-16 | Stop reason: SDUPTHER

## 2018-07-16 RX ORDER — NITROGLYCERIN 0.4 MG/1
0.4 TABLET SUBLINGUAL EVERY 5 MIN PRN
Status: DISCONTINUED | OUTPATIENT
Start: 2018-07-16 | End: 2018-07-19 | Stop reason: HOSPADM

## 2018-07-16 RX ORDER — DEXAMETHASONE SODIUM PHOSPHATE 4 MG/ML
INJECTION, SOLUTION INTRA-ARTICULAR; INTRALESIONAL; INTRAMUSCULAR; INTRAVENOUS; SOFT TISSUE PRN
Status: DISCONTINUED | OUTPATIENT
Start: 2018-07-16 | End: 2018-07-16 | Stop reason: SDUPTHER

## 2018-07-16 RX ORDER — BISACODYL 10 MG
10 SUPPOSITORY, RECTAL RECTAL DAILY PRN
Status: DISCONTINUED | OUTPATIENT
Start: 2018-07-16 | End: 2018-07-19 | Stop reason: HOSPADM

## 2018-07-16 RX ORDER — DIPHENHYDRAMINE HYDROCHLORIDE 50 MG/ML
12.5 INJECTION INTRAMUSCULAR; INTRAVENOUS
Status: DISCONTINUED | OUTPATIENT
Start: 2018-07-16 | End: 2018-07-16 | Stop reason: HOSPADM

## 2018-07-16 RX ORDER — CALCIUM CARBONATE 200(500)MG
2 TABLET,CHEWABLE ORAL 4 TIMES DAILY PRN
Status: DISCONTINUED | OUTPATIENT
Start: 2018-07-16 | End: 2018-07-19 | Stop reason: HOSPADM

## 2018-07-16 RX ORDER — POLYVINYL ALCOHOL 14 MG/ML
1 SOLUTION/ DROPS OPHTHALMIC PRN
Status: DISCONTINUED | OUTPATIENT
Start: 2018-07-16 | End: 2018-07-19 | Stop reason: HOSPADM

## 2018-07-16 RX ORDER — OXYCODONE HYDROCHLORIDE AND ACETAMINOPHEN 5; 325 MG/1; MG/1
1 TABLET ORAL
Status: DISCONTINUED | OUTPATIENT
Start: 2018-07-16 | End: 2018-07-16 | Stop reason: HOSPADM

## 2018-07-16 RX ORDER — LUBIPROSTONE 24 UG/1
24 CAPSULE, GELATIN COATED ORAL
Status: DISCONTINUED | OUTPATIENT
Start: 2018-07-17 | End: 2018-07-19 | Stop reason: HOSPADM

## 2018-07-16 RX ORDER — ARIPIPRAZOLE 15 MG/1
30 TABLET ORAL DAILY
Status: DISCONTINUED | OUTPATIENT
Start: 2018-07-16 | End: 2018-07-19 | Stop reason: HOSPADM

## 2018-07-16 RX ORDER — LANOLIN ALCOHOL/MO/W.PET/CERES
3 CREAM (GRAM) TOPICAL NIGHTLY
Status: DISCONTINUED | OUTPATIENT
Start: 2018-07-16 | End: 2018-07-19 | Stop reason: HOSPADM

## 2018-07-16 RX ORDER — CALCIUM POLYCARBOPHIL 625 MG 625 MG/1
625 TABLET ORAL DAILY
Status: DISCONTINUED | OUTPATIENT
Start: 2018-07-16 | End: 2018-07-19 | Stop reason: HOSPADM

## 2018-07-16 RX ORDER — 0.9 % SODIUM CHLORIDE 0.9 %
1000 INTRAVENOUS SOLUTION INTRAVENOUS ONCE
Status: COMPLETED | OUTPATIENT
Start: 2018-07-16 | End: 2018-07-17

## 2018-07-16 RX ORDER — SODIUM CHLORIDE 0.9 % (FLUSH) 0.9 %
10 SYRINGE (ML) INJECTION EVERY 12 HOURS SCHEDULED
Status: DISCONTINUED | OUTPATIENT
Start: 2018-07-16 | End: 2018-07-16 | Stop reason: HOSPADM

## 2018-07-16 RX ORDER — FLUPHENAZINE HYDROCHLORIDE 5 MG/1
25 TABLET ORAL NIGHTLY
Status: DISCONTINUED | OUTPATIENT
Start: 2018-07-16 | End: 2018-07-19 | Stop reason: HOSPADM

## 2018-07-16 RX ORDER — TOPIRAMATE 100 MG/1
100 TABLET, FILM COATED ORAL 2 TIMES DAILY
Status: DISCONTINUED | OUTPATIENT
Start: 2018-07-16 | End: 2018-07-19 | Stop reason: HOSPADM

## 2018-07-16 RX ORDER — VENLAFAXINE HYDROCHLORIDE 150 MG/1
150 CAPSULE, EXTENDED RELEASE ORAL DAILY
Status: DISCONTINUED | OUTPATIENT
Start: 2018-07-16 | End: 2018-07-19 | Stop reason: HOSPADM

## 2018-07-16 RX ADMIN — ALBUTEROL SULFATE 2.5 MG: 2.5 SOLUTION RESPIRATORY (INHALATION) at 20:56

## 2018-07-16 RX ADMIN — SODIUM CHLORIDE 1000 ML: 9 INJECTION, SOLUTION INTRAVENOUS at 18:43

## 2018-07-16 RX ADMIN — DEXAMETHASONE SODIUM PHOSPHATE 10 MG: 4 INJECTION, SOLUTION INTRAMUSCULAR; INTRAVENOUS at 06:49

## 2018-07-16 RX ADMIN — PROPOFOL 150 MG: 10 INJECTION, EMULSION INTRAVENOUS at 06:47

## 2018-07-16 RX ADMIN — LEVOTHYROXINE SODIUM 25 MCG: 25 TABLET ORAL at 16:33

## 2018-07-16 RX ADMIN — FENTANYL CITRATE 50 MCG: 50 INJECTION, SOLUTION INTRAMUSCULAR; INTRAVENOUS at 11:01

## 2018-07-16 RX ADMIN — PHENYLEPHRINE HYDROCHLORIDE 80 MCG: 10 INJECTION INTRAMUSCULAR; INTRAVENOUS; SUBCUTANEOUS at 08:05

## 2018-07-16 RX ADMIN — MIDAZOLAM 2 MG: 1 INJECTION INTRAMUSCULAR; INTRAVENOUS at 06:35

## 2018-07-16 RX ADMIN — CEFAZOLIN SODIUM 2 G: 2 SOLUTION INTRAVENOUS at 22:37

## 2018-07-16 RX ADMIN — VENLAFAXINE HYDROCHLORIDE 150 MG: 150 CAPSULE, EXTENDED RELEASE ORAL at 16:33

## 2018-07-16 RX ADMIN — SODIUM CHLORIDE: 9 INJECTION, SOLUTION INTRAVENOUS at 08:00

## 2018-07-16 RX ADMIN — FENTANYL CITRATE 50 MCG: 50 INJECTION, SOLUTION INTRAMUSCULAR; INTRAVENOUS at 10:50

## 2018-07-16 RX ADMIN — NEOSTIGMINE METHYLSULFATE 3 MG: 0.5 INJECTION INTRAVENOUS at 10:40

## 2018-07-16 RX ADMIN — ONDANSETRON 4 MG: 2 INJECTION INTRAMUSCULAR; INTRAVENOUS at 10:30

## 2018-07-16 RX ADMIN — ARIPIPRAZOLE 30 MG: 15 TABLET ORAL at 16:34

## 2018-07-16 RX ADMIN — LIDOCAINE HYDROCHLORIDE 40 MG: 20 INJECTION, SOLUTION INTRAVENOUS at 06:47

## 2018-07-16 RX ADMIN — LORAZEPAM 0.5 MG: 0.5 TABLET ORAL at 16:34

## 2018-07-16 RX ADMIN — CEFAZOLIN SODIUM 2 G: 2 SOLUTION INTRAVENOUS at 06:49

## 2018-07-16 RX ADMIN — TOPIRAMATE 100 MG: 100 TABLET, FILM COATED ORAL at 22:28

## 2018-07-16 RX ADMIN — DOCUSATE SODIUM 100 MG: 100 CAPSULE, LIQUID FILLED ORAL at 22:28

## 2018-07-16 RX ADMIN — FLUPHENAZINE HYDROCHLORIDE 25 MG: 5 TABLET, FILM COATED ORAL at 22:28

## 2018-07-16 RX ADMIN — Medication 10 MG: at 07:53

## 2018-07-16 RX ADMIN — FLUPHENAZINE HYDROCHLORIDE 10 MG: 5 TABLET, FILM COATED ORAL at 16:34

## 2018-07-16 RX ADMIN — CEFAZOLIN SODIUM 2 G: 2 SOLUTION INTRAVENOUS at 14:51

## 2018-07-16 RX ADMIN — TOPIRAMATE 100 MG: 100 TABLET, FILM COATED ORAL at 16:33

## 2018-07-16 RX ADMIN — BENZTROPINE MESYLATE 0.5 MG: 0.5 TABLET ORAL at 16:34

## 2018-07-16 RX ADMIN — BENZTROPINE MESYLATE 0.5 MG: 0.5 TABLET ORAL at 22:28

## 2018-07-16 RX ADMIN — MELATONIN 3 MG ORAL TABLET 3 MG: 3 TABLET ORAL at 22:28

## 2018-07-16 RX ADMIN — GABAPENTIN 100 MG: 100 CAPSULE ORAL at 22:28

## 2018-07-16 RX ADMIN — OXYBUTYNIN CHLORIDE 5 MG: 5 TABLET ORAL at 22:28

## 2018-07-16 RX ADMIN — FENTANYL CITRATE 100 MCG: 50 INJECTION, SOLUTION INTRAMUSCULAR; INTRAVENOUS at 06:47

## 2018-07-16 RX ADMIN — MORPHINE SULFATE 2 MG: 2 INJECTION, SOLUTION INTRAMUSCULAR; INTRAVENOUS at 14:47

## 2018-07-16 RX ADMIN — DOCUSATE SODIUM 100 MG: 100 CAPSULE, LIQUID FILLED ORAL at 16:34

## 2018-07-16 RX ADMIN — OXYBUTYNIN CHLORIDE 5 MG: 5 TABLET ORAL at 16:33

## 2018-07-16 RX ADMIN — ROCURONIUM BROMIDE 50 MG: 10 INJECTION, SOLUTION INTRAVENOUS at 06:47

## 2018-07-16 RX ADMIN — MORPHINE SULFATE 4 MG: 4 INJECTION INTRAVENOUS at 18:48

## 2018-07-16 RX ADMIN — CALCIUM POLYCARBOPHIL 625 MG: 625 TABLET, FILM COATED ORAL at 16:33

## 2018-07-16 RX ADMIN — Medication 10 ML: at 18:48

## 2018-07-16 RX ADMIN — Medication 0.6 MG: at 10:40

## 2018-07-16 RX ADMIN — SODIUM CHLORIDE: 9 INJECTION, SOLUTION INTRAVENOUS at 06:16

## 2018-07-16 ASSESSMENT — PULMONARY FUNCTION TESTS
PIF_VALUE: 23
PIF_VALUE: 25
PIF_VALUE: 24
PIF_VALUE: 25
PIF_VALUE: 25
PIF_VALUE: 22
PIF_VALUE: 25
PIF_VALUE: 25
PIF_VALUE: 22
PIF_VALUE: 23
PIF_VALUE: 23
PIF_VALUE: 24
PIF_VALUE: 23
PIF_VALUE: 22
PIF_VALUE: 23
PIF_VALUE: 23
PIF_VALUE: 24
PIF_VALUE: 23
PIF_VALUE: 20
PIF_VALUE: 22
PIF_VALUE: 23
PIF_VALUE: 25
PIF_VALUE: 23
PIF_VALUE: 23
PIF_VALUE: 1
PIF_VALUE: 23
PIF_VALUE: 20
PIF_VALUE: 22
PIF_VALUE: 24
PIF_VALUE: 23
PIF_VALUE: 25
PIF_VALUE: 20
PIF_VALUE: 24
PIF_VALUE: 23
PIF_VALUE: 23
PIF_VALUE: 25
PIF_VALUE: 4
PIF_VALUE: 24
PIF_VALUE: 23
PIF_VALUE: 2
PIF_VALUE: 23
PIF_VALUE: 24
PIF_VALUE: 24
PIF_VALUE: 23
PIF_VALUE: 24
PIF_VALUE: 25
PIF_VALUE: 26
PIF_VALUE: 25
PIF_VALUE: 24
PIF_VALUE: 24
PIF_VALUE: 23
PIF_VALUE: 12
PIF_VALUE: 24
PIF_VALUE: 4
PIF_VALUE: 24
PIF_VALUE: 23
PIF_VALUE: 23
PIF_VALUE: 25
PIF_VALUE: 23
PIF_VALUE: 5
PIF_VALUE: 26
PIF_VALUE: 24
PIF_VALUE: 4
PIF_VALUE: 23
PIF_VALUE: 4
PIF_VALUE: 25
PIF_VALUE: 23
PIF_VALUE: 23
PIF_VALUE: 24
PIF_VALUE: 25
PIF_VALUE: 24
PIF_VALUE: 25
PIF_VALUE: 23
PIF_VALUE: 23
PIF_VALUE: 26
PIF_VALUE: 24
PIF_VALUE: 4
PIF_VALUE: 24
PIF_VALUE: 5
PIF_VALUE: 23
PIF_VALUE: 26
PIF_VALUE: 3
PIF_VALUE: 25
PIF_VALUE: 26
PIF_VALUE: 23
PIF_VALUE: 23
PIF_VALUE: 24
PIF_VALUE: 23
PIF_VALUE: 25
PIF_VALUE: 23
PIF_VALUE: 23
PIF_VALUE: 24
PIF_VALUE: 24
PIF_VALUE: 23
PIF_VALUE: 24
PIF_VALUE: 25
PIF_VALUE: 22
PIF_VALUE: 8
PIF_VALUE: 1
PIF_VALUE: 24
PIF_VALUE: 25
PIF_VALUE: 21
PIF_VALUE: 23
PIF_VALUE: 24
PIF_VALUE: 24
PIF_VALUE: 23
PIF_VALUE: 2
PIF_VALUE: 25
PIF_VALUE: 26
PIF_VALUE: 4
PIF_VALUE: 3
PIF_VALUE: 25
PIF_VALUE: 23
PIF_VALUE: 24
PIF_VALUE: 4
PIF_VALUE: 24
PIF_VALUE: 23
PIF_VALUE: 24
PIF_VALUE: 25
PIF_VALUE: 3
PIF_VALUE: 23
PIF_VALUE: 23
PIF_VALUE: 25
PIF_VALUE: 23
PIF_VALUE: 24
PIF_VALUE: 23
PIF_VALUE: 26
PIF_VALUE: 23
PIF_VALUE: 25
PIF_VALUE: 24
PIF_VALUE: 23
PIF_VALUE: 25
PIF_VALUE: 28
PIF_VALUE: 3
PIF_VALUE: 24
PIF_VALUE: 24
PIF_VALUE: 21
PIF_VALUE: 24
PIF_VALUE: 3
PIF_VALUE: 28
PIF_VALUE: 24
PIF_VALUE: 23
PIF_VALUE: 25
PIF_VALUE: 25
PIF_VALUE: 2
PIF_VALUE: 24
PIF_VALUE: 22
PIF_VALUE: 25
PIF_VALUE: 23
PIF_VALUE: 24
PIF_VALUE: 25
PIF_VALUE: 24
PIF_VALUE: 25
PIF_VALUE: 23
PIF_VALUE: 24
PIF_VALUE: 22
PIF_VALUE: 24
PIF_VALUE: 21
PIF_VALUE: 21
PIF_VALUE: 24
PIF_VALUE: 24
PIF_VALUE: 25
PIF_VALUE: 24
PIF_VALUE: 23
PIF_VALUE: 25
PIF_VALUE: 23
PIF_VALUE: 12
PIF_VALUE: 25
PIF_VALUE: 24
PIF_VALUE: 23
PIF_VALUE: 1
PIF_VALUE: 23
PIF_VALUE: 23
PIF_VALUE: 24
PIF_VALUE: 25
PIF_VALUE: 25
PIF_VALUE: 23
PIF_VALUE: 3
PIF_VALUE: 24
PIF_VALUE: 25
PIF_VALUE: 23
PIF_VALUE: 2
PIF_VALUE: 23
PIF_VALUE: 4
PIF_VALUE: 24
PIF_VALUE: 23
PIF_VALUE: 25
PIF_VALUE: 24
PIF_VALUE: 21
PIF_VALUE: 25
PIF_VALUE: 26
PIF_VALUE: 25
PIF_VALUE: 25
PIF_VALUE: 24
PIF_VALUE: 23
PIF_VALUE: 24
PIF_VALUE: 24
PIF_VALUE: 23
PIF_VALUE: 23
PIF_VALUE: 24
PIF_VALUE: 25
PIF_VALUE: 24
PIF_VALUE: 28
PIF_VALUE: 25

## 2018-07-16 ASSESSMENT — PAIN DESCRIPTION - ONSET: ONSET: ON-GOING

## 2018-07-16 ASSESSMENT — PAIN SCALES - GENERAL
PAINLEVEL_OUTOF10: 0
PAINLEVEL_OUTOF10: 0
PAINLEVEL_OUTOF10: 6
PAINLEVEL_OUTOF10: 0
PAINLEVEL_OUTOF10: 9
PAINLEVEL_OUTOF10: 0
PAINLEVEL_OUTOF10: 6
PAINLEVEL_OUTOF10: 0

## 2018-07-16 ASSESSMENT — PAIN DESCRIPTION - PAIN TYPE
TYPE: SURGICAL PAIN
TYPE: SURGICAL PAIN

## 2018-07-16 ASSESSMENT — PAIN DESCRIPTION - ORIENTATION: ORIENTATION: RIGHT;LEFT;LOWER;MID

## 2018-07-16 ASSESSMENT — PAIN DESCRIPTION - DESCRIPTORS
DESCRIPTORS: ACHING;DISCOMFORT;SORE
DESCRIPTORS: ACHING;DISCOMFORT

## 2018-07-16 ASSESSMENT — PAIN - FUNCTIONAL ASSESSMENT: PAIN_FUNCTIONAL_ASSESSMENT: 0-10

## 2018-07-16 ASSESSMENT — PAIN DESCRIPTION - PROGRESSION: CLINICAL_PROGRESSION: GRADUALLY WORSENING

## 2018-07-16 ASSESSMENT — PAIN DESCRIPTION - FREQUENCY: FREQUENCY: CONTINUOUS

## 2018-07-16 ASSESSMENT — PAIN DESCRIPTION - LOCATION: LOCATION: BACK

## 2018-07-16 NOTE — PROGRESS NOTES
Occupational Therapy          OT consult received and appreciated. Chart reviewed. Will hold evaluation due to LSO brace ordered . Will evaluate at a later time after brace has arrived. Thank you. Vignesh Marquez.  Mario Alberto 73, [address]

## 2018-07-16 NOTE — CONSULTS
Hospital Medicine  Consult History & Physical        Chief Complaint:    Medical management    Date of Service:   7/16/2018    History Of Present Illness:      59 y.o. female who we are asked to see/evaluate by Libra Myrick MD for medical management. Admitted to neurosurgery following posterior lumbar fusion L3-S1. She had previous laminectomy in 2009 which provided some relief at the time. She has failed conservative therapy with PT and pain management with injections. She has PMH of bipolar, CKD, COPD, PVD, hypothyroid, HLD, seizures, EMILEE, DM. She has history of MRSA after her last back surgery.  [facility] consulted for medical management    Past Medical History:        Diagnosis Date    Anxiety     Arrhythmia     Bipolar 2 disorder ([address]     Chronic back pain     CKD     Constipation     Continuous urine leakage     COPD (chronic obstructive pulmonary disease) (HCC)     Depression     Dysphagia     Environmental allergies     GERD (gastroesophageal reflux disease)     History of cardiovascular stress test 10-    LEXISCAN    History of cardiovascular stress test 06/2014    lexiscan stress    History of cardiovascular stress test 05/22/2018    Lexiscan stress test    Hyperlipidemia     MDRO (multiple drug resistant organisms) resistance     mrsa 3-4 yrs ago with back surgery    Menopausal symptoms     atrophic vaginitis    Mitral valve prolapse     Nausea & vomiting     with anesthesia    Neurogenic bladder     Obstructive sleep apnea     Overactive bladder     PONV (postoperative nausea and vomiting)     Pulmonary embolism (HCC)     few yrs ago has vena cava filter    PVD (peripheral vascular disease) (HCC)     Schizoaffective disorder (HCC)     Seizure disorder (HCC)     stares    Seizures ([address]     Thyroid disease     x30 years    Type 2 diabetes mellitus without complication ([address]        Past Surgical History:        Procedure Laterality Date    APPENDECTOMY      BACK SURGERY      BLADDER SURGERY      multiple   d/t  incontinence    BREAST SURGERY      bx neg lt    COLONOSCOPY      CYST REMOVAL  2006    vulvular sebaceous cyst excision    DIAGNOSTIC CARDIAC CATH LAB PROCEDURE  1996/1997    showed \"mitral valve prolapse\", records not available     ECHO COMPL W DOP COLOR FLOW  10/10/2012         ENDOSCOPY, COLON, DIAGNOSTIC      HAMMER TOE SURGERY      lt    HIP SURGERY       screws and rods     HYSTERECTOMY      JOINT REPLACEMENT      rt hip & revision    KNEE ARTHROSCOPY  10-26-12    left    OTHER SURGICAL HISTORY      supra pubic tubing for 1 1/2 yrs then removed    OTHER SURGICAL HISTORY Bilateral JULY 11, 2013    BILAT MAXILLARY ANTROSTOMY,BILATERAL  INFERIOR TURBINATE CAUTERY AND LATERALIZATION,ENDOSCOPIC EXCISION BILATERAL MIDDLE TURBINATE ELVIS BULLOSA     OTHER SURGICAL HISTORY Left 7-22-15    left knee arthroscopy partial lateral menisectomy chrondroplasty partial synovectomy     TONSILLECTOMY      UPPER GASTROINTESTINAL ENDOSCOPY      VENA CAVA FILTER PLACEMENT         Medications Prior to Admission:      Prior to Admission medications    Medication Sig Start Date End Date Taking?  Authorizing Provider   acetaminophen (TYLENOL) 325 MG tablet Take 650 mg by mouth every 4 hours as needed for Pain or Fever MAX 4 GMS IN 24 HOURS   Yes Historical Provider, MD   Calcium & Magnesium Carbonates (MYLANTA PO) Take 20 mLs by mouth every 4 hours as needed GI DISTRESS   Yes Historical Provider, MD   Dextromethorphan-Guaifenesin (ROBITUSSIN DM PO) Take 10 mLs by mouth every 4 hours as needed   Yes Historical Provider, MD   magnesium hydroxide (MILK OF MAGNESIA) 400 MG/5ML suspension Take 30 mLs by mouth daily as needed for Constipation IF NO BM 3 DAYS   Yes Historical Provider, MD   Bismuth Subsalicylate (PEPTO-BISMOL PO) Take 30 mLs by mouth EVERY HOUR PRN DIARRHEA/UPSET STOMACH MAX 8 DOSES IN 24 HOURS   Yes Historical Provider, MD   glucose TO 4 TIMES PER DAY AS NEEDED FOR COUGH, WHEEZING OR SHORTNESS OF BREATH   Yes Historical Provider, MD   polycarbophil (FIBERCON) 625 MG tablet Take 625 mg by mouth daily WITH GLASS OF WATER   Yes Historical Provider, MD   polyethyl glycol-propyl glycol 0.4-0.3 % (SYSTANE) 0.4-0.3 % ophthalmic solution Place 1 drop into both eyes 4 times daily    Yes Historical Provider, MD   Sodium Phosphates (FLEET) 7-19 GM/118ML Place 1 enema rectally once as needed If no bowel movement in 2 days ( IF NO DULCOLAX SUPP RESULTS)   Yes Historical Provider, MD   LORazepam (ATIVAN) 0.5 MG tablet Take 0.5 mg by mouth 2 times daily. PRN. Yes Historical Provider, MD   melatonin 3 MG TABS tablet Take 3 mg by mouth nightly    Yes Historical Provider, MD   lubiprostone (AMITIZA) 24 MCG capsule Take 24 mcg by mouth daily (with breakfast)   Yes Historical Provider, MD   Acetaminophen-Codeine (TYLENOL WITH CODEINE #3 PO) Take by mouth Pt takes 2 tabs four times daily as needed (NOT TO EXCEED 4 GMS APAP/24 HOURS   Yes Historical Provider, MD   oxybutynin (DITROPAN) 5 MG tablet Take 5 mg by mouth 3 times daily   Yes Historical Provider, MD   loperamide (IMODIUM) 2 MG capsule Take 2 mg by mouth as needed for Diarrhea 1 CAPSULE AFTER EACH LOOSE STOOL AS NEEDED FOR DIARRHEA.  MAX 8 CAPS/DAY   Yes Historical Provider, MD   ASPIRIN LOW DOSE 81 MG chewable tablet Take 81 mg by mouth daily  10/5/16  Yes Historical Provider, MD   venlafaxine (EFFEXOR XR) 150 MG extended release capsule Take 150 mg by mouth daily  10/26/16  Yes Historical Provider, MD   bisacodyl (DULCOLAX) 10 MG suppository Place 10 mg rectally daily as needed for Constipation IF NO MILK OF MAG RESULTS   Yes Historical Provider, MD   polyvinyl alcohol (ARTIFICIAL TEARS) 1.4 % ophthalmic solution Place 1 drop into both eyes as needed    Yes Historical Provider, MD   fluPHENAZine HCl (PROLIXIN) 10 MG tablet Take 10 mg by mouth daily Every morning   Yes Historical Provider, MD

## 2018-07-16 NOTE — PROGRESS NOTES
Eucrisa oint. 2% is non-formulary and will not be dispensed by the pharmacy at this time. Please have patient bring in home supply of medication and deliver it to pharmacy for identification and barcode. If patient has not supplied medication by 1400 on 07/17/18, please notify pharmacy.  Ayala Tavarez, PharmPATRICK 7/16/2018 2:19 PM

## 2018-07-16 NOTE — BRIEF OP NOTE
N/A 3   IMPL SPINE CROW PRECUT SOLERA 70MM Spine IMPL SPINE CROW PRECUT SOLERA 70MM  [employer] [employer]  N/A 2   SET SCREW SOLERA Spine SET SCREW SOLERA   [employer] [employer]   N/A 8         Drains:   Closed/Suction Drain Posterior Back Accordion 10 [place] (Active)       Urethral Catheter Non-latex (Active)       Findings: see dictated op note    Aaliyah Christy MD  Date: 7/16/2018  Time: 10:47 AM

## 2018-07-16 NOTE — ANESTHESIA PRE PROCEDURE
Historical Provider, MD   fluPHENAZine HCl (PROLIXIN) 10 MG tablet Take 25 mg by mouth nightly GIVE 2.5 TABLETS OF THE 10 MG TO EQUAL 25 MG   Yes Historical Provider, MD   Crisaborole 2 % OINT Apply 1 drop topically daily 6/20/18  Yes Jennifer Baker DO   Cholecalciferol (VITAMIN D3) 60893 units CAPS Take by mouth once a week SATURDAY   Yes Historical Provider, MD   ARIPiprazole (ABILIFY) 30 MG tablet Take 30 mg by mouth daily   Yes Historical Provider, MD   Prenatal Vit-Fe Fumarate-FA (VIRT BALA) 28-1 MG TABS Take 1 tablet by mouth daily   Yes Historical Provider, MD   metFORMIN (GLUCOPHAGE) 500 MG tablet Take 500 mg by mouth daily (with breakfast)   Yes Historical Provider, MD   polyethylene glycol (GLYCOLAX) packet Take 17 g by mouth daily as needed for Constipation IN 8 OZ WATER/JUICE/SODA/COFFEE/TEA AND DRINK BY MOUTH DAILY AS NEEDED FOR CONSTIPATION   Yes Historical Provider, MD   albuterol sulfate  (90 Base) MCG/ACT inhaler Inhale 2 puffs into the lungs every 6 hours as needed for Wheezing or Shortness of Breath UP TO 4 TIMES A DAY   Yes Historical Provider, MD   sucralfate (CARAFATE) 1 GM tablet Take 1 g by mouth 4 times daily   Yes Historical Provider, MD   albuterol (PROVENTIL) (2.5 MG/3ML) 0.083% nebulizer solution Take 2.5 mg by nebulization 2 times daily UP TO 4 TIMES PER DAY AS NEEDED FOR COUGH, WHEEZING OR SHORTNESS OF BREATH   Yes Historical Provider, MD   polycarbophil (FIBERCON) 625 MG tablet Take 625 mg by mouth daily WITH GLASS OF WATER   Yes Historical Provider, MD   polyethyl glycol-propyl glycol 0.4-0.3 % (SYSTANE) 0.4-0.3 % ophthalmic solution Place 1 drop into both eyes 4 times daily    Yes Historical Provider, MD   Sodium Phosphates (FLEET) 7-19 GM/118ML Place 1 enema rectally once as needed If no bowel movement in 2 days ( IF NO DULCOLAX SUPP RESULTS)   Yes Historical Provider, MD   LORazepam (ATIVAN) 0.5 MG tablet Take 0.5 mg by mouth 2 times daily. PRN.    Yes Historical Provider, MD   melatonin 3 MG TABS tablet Take 3 mg by mouth nightly    Yes Historical Provider, MD   lubiprostone (AMITIZA) 24 MCG capsule Take 24 mcg by mouth daily (with breakfast)   Yes Historical Provider, MD   Acetaminophen-Codeine (TYLENOL WITH CODEINE #3 PO) Take by mouth Pt takes 2 tabs four times daily as needed (NOT TO EXCEED 4 GMS APAP/24 HOURS   Yes Historical Provider, MD   oxybutynin (DITROPAN) 5 MG tablet Take 5 mg by mouth 3 times daily   Yes Historical Provider, MD   loperamide (IMODIUM) 2 MG capsule Take 2 mg by mouth as needed for Diarrhea 1 CAPSULE AFTER EACH LOOSE STOOL AS NEEDED FOR DIARRHEA. MAX 8 CAPS/DAY   Yes Historical Provider, MD   ASPIRIN LOW DOSE 81 MG chewable tablet Take 81 mg by mouth daily  10/5/16  Yes Historical Provider, MD   venlafaxine (EFFEXOR XR) 150 MG extended release capsule Take 150 mg by mouth daily  10/26/16  Yes Historical Provider, MD   bisacodyl (DULCOLAX) 10 MG suppository Place 10 mg rectally daily as needed for Constipation IF NO MILK OF MAG RESULTS   Yes Historical Provider, MD   polyvinyl alcohol (ARTIFICIAL TEARS) 1.4 % ophthalmic solution Place 1 drop into both eyes as needed    Yes Historical Provider, MD   fluPHENAZine HCl (PROLIXIN) 10 MG tablet Take 10 mg by mouth daily Every morning   Yes Historical Provider, MD   nitroGLYCERIN (NITROSTAT) 0.4 MG SL tablet Place 1 tablet under the tongue every 5 minutes as needed for Chest pain  Patient taking differently: Place 0.4 mg under the tongue every 5 minutes as needed for Chest pain EVERY 5 MIN AS NEEDED TIMES 3 FOR CHEST PAIN 8/9/16  Yes Ayesha Gonzalez MD   gabapentin (NEURONTIN) 100 MG capsule Take 100 mg by mouth 3 times daily    Yes Historical Provider, MD   benztropine (COGENTIN) 0.5 MG tablet Take 0.5 mg by mouth 3 times daily    Yes Historical Provider, MD   levothyroxine (SYNTHROID) 25 MCG tablet TAKE 1 TABLET BY MOUTH DAILY.  7/30/15  Yes Cecily Lowery DO   topiramate (TOPAMAX) 100 MG tablet 14.4 oz (104.7 kg)     Body mass index is 37.12 kg/m². CBC:   Lab Results   Component Value Date    WBC 6.2 05/21/2018    RBC 3.95 05/21/2018    HGB 12.2 05/21/2018    HCT 35.9 05/21/2018    MCV 90.9 05/21/2018    RDW 13.6 05/21/2018     05/21/2018       CMP:   Lab Results   Component Value Date     05/21/2018    K 3.8 05/21/2018     05/21/2018    CO2 23 05/21/2018    BUN 13 05/21/2018    CREATININE 1.0 05/21/2018    GFRAA >60 05/21/2018    LABGLOM 56 05/21/2018    GLUCOSE 103 05/21/2018    GLUCOSE 102 04/19/2012    PROT 6.4 05/21/2018    CALCIUM 9.1 05/21/2018    BILITOT <0.2 05/21/2018    ALKPHOS 65 05/21/2018    AST 20 05/21/2018    ALT 16 05/21/2018       POC Tests: No results for input(s): POCGLU, POCNA, POCK, POCCL, POCBUN, POCHEMO, POCHCT in the last 72 hours.     Coags:   Lab Results   Component Value Date    PROTIME 11.4 09/05/2016    PROTIME 14.7 10/14/2010    INR 1.1 09/05/2016    APTT 30.7 09/05/2016       HCG (If Applicable): No results found for: PREGTESTUR, PREGSERUM, HCG, HCGQUANT     ABGs: No results found for: PHART, PO2ART, TSJ4HZG, NXS4XBV, BEART, C4KGDKML     Type & Screen (If Applicable):  No results found for: LABABO, LABRH   EKG 5/2018  Normal sinus rhythm  Left axis deviation  Possible Anterolateral infarct (cited on or before 05-SEP-2016)  Abnormal ECG  When compared with ECG of 06-SEP-2016 06:48,  premature ventricular complexes are no longer present  Nonspecific T wave abnormality, worse in Anterolateral leads  Confirmed by Everett Hurtado ([id number]) on 6/25/2018 6:17:15 AM   Lab and Collection     EKG 12 Lead on 5/21/2018   Result History     EKG 12 Lead on 6/25/2018 - Result Edited   Order Report      Order Details     ECHO 5/2018  Left ventricular size is grossly normal.   Moderate left ventricular concentric hypertrophy noted.   Ejection fraction is measured at 53%.   No evidence of left ventricular mass or thrombus noted.   No regional wall motion abnormalities seen.  Rosana Washington left atrium is mildly dilated.   Interatrial septum appears intact.   No evidence of thrombus within left atrium.   Physiologic and/or trace mitral regurgitation is present.   No mitral valve stenosis present.   Physiologic and/or trace tricuspid regurgitation.   Regular rhythm.      Signature    Anesthesia Evaluation  Patient summary reviewed and Nursing notes reviewed   history of anesthetic complications: PONV. Airway: Mallampati: II  TM distance: <3 FB   Neck ROM: limited  Mouth opening: > = 3 FB Dental:          Pulmonary:   (+) COPD:  sleep apnea:                             Cardiovascular:    (+) hypertension:,       ECG reviewed        Stress test reviewed                Neuro/Psych:   (+) neuromuscular disease:, psychiatric history: stable with treatmentdepression/anxiety             GI/Hepatic/Renal:   (+) GERD:,           Endo/Other:    (+) DiabetesType II DM, , hypothyroidism::., .                 Abdominal:           Vascular:   + PE.        ROS comment: has vena cava filter. Anesthesia Plan      general     ASA 3       Induction: intravenous. BIS    Anesthetic plan and risks discussed with patient. Use of blood products discussed with patient whom consented to blood products. Goreg Carrillo RN   7/16/2018    Agree with above assessment. Physical exam unchanged. Spoke to patient about anesthetic plan.   Patient understands and wishes to proceed.  (this addendum was done preop but unable to be filed electronically at that time)

## 2018-07-17 LAB
ANION GAP SERPL CALCULATED.3IONS-SCNC: 11 MMOL/L (ref 7–16)
BUN BLDV-MCNC: 10 MG/DL (ref 8–23)
CALCIUM SERPL-MCNC: 8.4 MG/DL (ref 8.6–10.2)
CHLORIDE BLD-SCNC: 106 MMOL/L (ref 98–107)
CO2: 23 MMOL/L (ref 22–29)
CREAT SERPL-MCNC: 0.9 MG/DL (ref 0.5–1)
GFR AFRICAN AMERICAN: >60
GFR NON-AFRICAN AMERICAN: >60 ML/MIN/1.73
GLUCOSE BLD-MCNC: 127 MG/DL (ref 74–109)
METER GLUCOSE: 123 MG/DL (ref 70–110)
METER GLUCOSE: 132 MG/DL (ref 70–110)
POTASSIUM REFLEX MAGNESIUM: 4 MMOL/L (ref 3.5–5)
SODIUM BLD-SCNC: 140 MMOL/L (ref 132–146)

## 2018-07-17 PROCEDURE — 94640 AIRWAY INHALATION TREATMENT: CPT

## 2018-07-17 PROCEDURE — 2580000003 HC RX 258: Performed by: NEUROLOGICAL SURGERY

## 2018-07-17 PROCEDURE — G8988 SELF CARE GOAL STATUS: HCPCS

## 2018-07-17 PROCEDURE — 97530 THERAPEUTIC ACTIVITIES: CPT

## 2018-07-17 PROCEDURE — 6370000000 HC RX 637 (ALT 250 FOR IP): Performed by: NEUROLOGICAL SURGERY

## 2018-07-17 PROCEDURE — 82962 GLUCOSE BLOOD TEST: CPT

## 2018-07-17 PROCEDURE — 97162 PT EVAL MOD COMPLEX 30 MIN: CPT

## 2018-07-17 PROCEDURE — L0627 LO SAG RI AN/POS PNL PRE CST: HCPCS

## 2018-07-17 PROCEDURE — 6360000002 HC RX W HCPCS: Performed by: NEUROLOGICAL SURGERY

## 2018-07-17 PROCEDURE — 97166 OT EVAL MOD COMPLEX 45 MIN: CPT

## 2018-07-17 PROCEDURE — G8987 SELF CARE CURRENT STATUS: HCPCS

## 2018-07-17 PROCEDURE — G8978 MOBILITY CURRENT STATUS: HCPCS

## 2018-07-17 PROCEDURE — 36415 COLL VENOUS BLD VENIPUNCTURE: CPT

## 2018-07-17 PROCEDURE — 94150 VITAL CAPACITY TEST: CPT

## 2018-07-17 PROCEDURE — G8979 MOBILITY GOAL STATUS: HCPCS

## 2018-07-17 PROCEDURE — 1200000000 HC SEMI PRIVATE

## 2018-07-17 PROCEDURE — 6370000000 HC RX 637 (ALT 250 FOR IP): Performed by: PHYSICIAN ASSISTANT

## 2018-07-17 PROCEDURE — 80048 BASIC METABOLIC PNL TOTAL CA: CPT

## 2018-07-17 RX ORDER — OXYCODONE HYDROCHLORIDE AND ACETAMINOPHEN 5; 325 MG/1; MG/1
2 TABLET ORAL EVERY 4 HOURS PRN
Status: DISCONTINUED | OUTPATIENT
Start: 2018-07-17 | End: 2018-07-19 | Stop reason: HOSPADM

## 2018-07-17 RX ADMIN — CEFAZOLIN SODIUM 2 G: 2 SOLUTION INTRAVENOUS at 13:43

## 2018-07-17 RX ADMIN — BENZTROPINE MESYLATE 0.5 MG: 0.5 TABLET ORAL at 08:21

## 2018-07-17 RX ADMIN — BENZTROPINE MESYLATE 0.5 MG: 0.5 TABLET ORAL at 13:43

## 2018-07-17 RX ADMIN — Medication 10 ML: at 20:23

## 2018-07-17 RX ADMIN — FLUPHENAZINE HYDROCHLORIDE 25 MG: 5 TABLET, FILM COATED ORAL at 20:30

## 2018-07-17 RX ADMIN — ALBUTEROL SULFATE 2.5 MG: 2.5 SOLUTION RESPIRATORY (INHALATION) at 07:29

## 2018-07-17 RX ADMIN — TOPIRAMATE 100 MG: 100 TABLET, FILM COATED ORAL at 08:21

## 2018-07-17 RX ADMIN — OXYBUTYNIN CHLORIDE 5 MG: 5 TABLET ORAL at 08:21

## 2018-07-17 RX ADMIN — DOCUSATE SODIUM 100 MG: 100 CAPSULE, LIQUID FILLED ORAL at 20:21

## 2018-07-17 RX ADMIN — OXYBUTYNIN CHLORIDE 5 MG: 5 TABLET ORAL at 20:23

## 2018-07-17 RX ADMIN — TOPIRAMATE 100 MG: 100 TABLET, FILM COATED ORAL at 20:30

## 2018-07-17 RX ADMIN — CALCIUM POLYCARBOPHIL 625 MG: 625 TABLET, FILM COATED ORAL at 08:21

## 2018-07-17 RX ADMIN — LORAZEPAM 0.5 MG: 0.5 TABLET ORAL at 17:10

## 2018-07-17 RX ADMIN — OXYCODONE HYDROCHLORIDE AND ACETAMINOPHEN 2 TABLET: 5; 325 TABLET ORAL at 09:48

## 2018-07-17 RX ADMIN — GABAPENTIN 100 MG: 100 CAPSULE ORAL at 20:30

## 2018-07-17 RX ADMIN — DOCUSATE SODIUM 100 MG: 100 CAPSULE, LIQUID FILLED ORAL at 08:21

## 2018-07-17 RX ADMIN — HYDROCODONE BITARTRATE AND ACETAMINOPHEN 2 TABLET: 5; 325 TABLET ORAL at 06:36

## 2018-07-17 RX ADMIN — VENLAFAXINE HYDROCHLORIDE 150 MG: 150 CAPSULE, EXTENDED RELEASE ORAL at 08:21

## 2018-07-17 RX ADMIN — CEFAZOLIN SODIUM 2 G: 2 SOLUTION INTRAVENOUS at 22:30

## 2018-07-17 RX ADMIN — LORAZEPAM 0.5 MG: 0.5 TABLET ORAL at 08:21

## 2018-07-17 RX ADMIN — FAMOTIDINE 20 MG: 20 TABLET, FILM COATED ORAL at 08:20

## 2018-07-17 RX ADMIN — MELATONIN 3 MG ORAL TABLET 3 MG: 3 TABLET ORAL at 20:22

## 2018-07-17 RX ADMIN — GABAPENTIN 100 MG: 100 CAPSULE ORAL at 08:20

## 2018-07-17 RX ADMIN — LEVOTHYROXINE SODIUM 25 MCG: 25 TABLET ORAL at 08:21

## 2018-07-17 RX ADMIN — OXYCODONE HYDROCHLORIDE AND ACETAMINOPHEN 2 TABLET: 5; 325 TABLET ORAL at 20:31

## 2018-07-17 RX ADMIN — CYCLOBENZAPRINE 10 MG: 10 TABLET, FILM COATED ORAL at 06:35

## 2018-07-17 RX ADMIN — FLUPHENAZINE HYDROCHLORIDE 10 MG: 5 TABLET, FILM COATED ORAL at 08:20

## 2018-07-17 RX ADMIN — GABAPENTIN 100 MG: 100 CAPSULE ORAL at 13:43

## 2018-07-17 RX ADMIN — OXYBUTYNIN CHLORIDE 5 MG: 5 TABLET ORAL at 13:43

## 2018-07-17 RX ADMIN — Medication 10 ML: at 08:21

## 2018-07-17 RX ADMIN — ARIPIPRAZOLE 30 MG: 15 TABLET ORAL at 08:21

## 2018-07-17 RX ADMIN — BENZTROPINE MESYLATE 0.5 MG: 0.5 TABLET ORAL at 20:30

## 2018-07-17 RX ADMIN — Medication 10 ML: at 13:44

## 2018-07-17 RX ADMIN — CEFAZOLIN SODIUM 2 G: 2 SOLUTION INTRAVENOUS at 06:32

## 2018-07-17 RX ADMIN — LUBIPROSTONE 24 MCG: 24 CAPSULE, GELATIN COATED ORAL at 08:21

## 2018-07-17 ASSESSMENT — PAIN DESCRIPTION - ORIENTATION
ORIENTATION: LOWER
ORIENTATION: LOWER

## 2018-07-17 ASSESSMENT — PAIN DESCRIPTION - PAIN TYPE
TYPE: SURGICAL PAIN

## 2018-07-17 ASSESSMENT — PAIN DESCRIPTION - DESCRIPTORS
DESCRIPTORS: ACHING;DISCOMFORT;SORE
DESCRIPTORS: SHARP

## 2018-07-17 ASSESSMENT — PAIN DESCRIPTION - FREQUENCY: FREQUENCY: CONTINUOUS

## 2018-07-17 ASSESSMENT — PAIN SCALES - GENERAL
PAINLEVEL_OUTOF10: 0
PAINLEVEL_OUTOF10: 7
PAINLEVEL_OUTOF10: 9
PAINLEVEL_OUTOF10: 8
PAINLEVEL_OUTOF10: 7
PAINLEVEL_OUTOF10: 4

## 2018-07-17 ASSESSMENT — PAIN DESCRIPTION - LOCATION
LOCATION: BACK

## 2018-07-17 ASSESSMENT — PAIN DESCRIPTION - PROGRESSION: CLINICAL_PROGRESSION: GRADUALLY WORSENING

## 2018-07-17 ASSESSMENT — PAIN DESCRIPTION - ONSET: ONSET: ON-GOING

## 2018-07-17 NOTE — CARE COORDINATION
Social Work/Discharge Planning:      RANJIT spoke with Romeo Mejia of [facility]). Liaison Jack Holstein stated that pt is able to be accepted at [address] when medically ready. Liaison Jack Holstein stated that pt will need level of care prior to discharge. SW notified pt of [address] acceptance, Pt agreeable to [address] at d/c. HENS completed. Will need Paper [id number] signed for Level of care prior to d/c, RN notified. SW will follow.       Electronically signed by LACY Shepherd on 7/17/2018 at 2:51 PM

## 2018-07-17 NOTE — PROGRESS NOTES
(PROLIXIN) tablet 10 mg, 10 mg, Oral, Daily  fluPHENAZine HCl (PROLIXIN) tablet 25 mg, 25 mg, Oral, Nightly  gabapentin (NEURONTIN) capsule 100 mg, 100 mg, Oral, TID  levothyroxine (SYNTHROID) tablet 25 mcg, 25 mcg, Oral, Daily  loperamide (IMODIUM) capsule 2 mg, 2 mg, Oral, PRN  LORazepam (ATIVAN) tablet 0.5 mg, 0.5 mg, Oral, BID  lubiprostone (AMITIZA) capsule 24 mcg, 24 mcg, Oral, Daily with breakfast  magnesium hydroxide (MILK OF MAGNESIA) 400 MG/5ML suspension 30 mL, 30 mL, Oral, Daily PRN  melatonin tablet 3 mg, 3 mg, Oral, Nightly  nitroGLYCERIN (NITROSTAT) SL tablet 0.4 mg, 0.4 mg, Sublingual, Q5 Min PRN  oxybutynin (DITROPAN) tablet 5 mg, 5 mg, Oral, TID  polycarbophil (FIBERCON) tablet 625 mg, 625 mg, Oral, Daily  polyethylene glycol (GLYCOLAX) packet 17 g, 17 g, Oral, Daily PRN  polyvinyl alcohol (LIQUIFILM TEARS) 1.4 % ophthalmic solution 1 drop, 1 drop, Both Eyes, PRN  famotidine (PEPCID) tablet 20 mg, 20 mg, Oral, Daily  topiramate (TOPAMAX) tablet 100 mg, 100 mg, Oral, BID  venlafaxine (EFFEXOR XR) extended release capsule 150 mg, 150 mg, Oral, Daily  sodium chloride flush 0.9 % injection 10 mL, 10 mL, Intravenous, 2 times per day  sodium chloride flush 0.9 % injection 10 mL, 10 mL, Intravenous, PRN  acetaminophen (TYLENOL) tablet 650 mg, 650 mg, Oral, Q4H PRN  docusate sodium (COLACE) capsule 100 mg, 100 mg, Oral, BID  ondansetron (ZOFRAN) injection 4 mg, 4 mg, Intravenous, Q6H PRN  ceFAZolin (ANCEF) 2 g in dextrose 3 % 50 mL IVPB (duplex), 2 g, Intravenous, Q8H  HYDROcodone-acetaminophen (NORCO) 5-325 MG per tablet 1 tablet, 1 tablet, Oral, Q4H PRN **OR** HYDROcodone-acetaminophen (NORCO) 5-325 MG per tablet 2 tablet, 2 tablet, Oral, Q4H PRN  morphine (PF) injection 2 mg, 2 mg, Intravenous, Q2H PRN **OR** morphine (PF) injection 4 mg, 4 mg, Intravenous, Q2H PRN  cyclobenzaprine (FLEXERIL) tablet 10 mg, 10 mg, Oral, TID PRN    ASSESSMENT AND PLAN:  Patient POD 1 S/p revision on L3-S1 fusion. Stable.

## 2018-07-17 NOTE — CARE COORDINATION
Patient states she wants to return to [facility]. [address] [phone]. Had to L/M for call back. Waiting to see if they have a rehab facility there and status of transition back. If patient needs rehab she would go to their sister facility [facility]. Patient will need a re-evaluation by Rosario Olsen, at discharge.  Call Vee Caceres at [phone] x [phone] prior to discharge

## 2018-07-17 NOTE — PROGRESS NOTES
Physical Therapy  Initial Assessment     Name: Palmer Madrid  : 1955  MRN: [id number]    Date of Service: 2018    Evaluating PT:  Sahara Ibarra, PT [id number]    Room #:  9960/4351-F  Diagnosis:  Degenerative lumbar spinal stenosis  Surgery: 18 s/p revision L3-S1 fusion  PMHx:        Diagnosis Date    Anxiety     Arrhythmia     Bipolar 2 disorder ([facility] [address]     Chronic back pain     CKD     Constipation     Continuous urine leakage     COPD (chronic obstructive pulmonary disease) (HCC)     Depression     Dysphagia     Environmental allergies     GERD (gastroesophageal reflux disease)     History of cardiovascular stress test 10-    LEXISCAN    History of cardiovascular stress test 2014    lexiscan stress    History of cardiovascular stress test 2018    Lexiscan stress test    Hyperlipidemia     MDRO (multiple drug resistant organisms) resistance     mrsa 3-4 yrs ago with back surgery    Menopausal symptoms     atrophic vaginitis    Mitral valve prolapse     Nausea & vomiting     with anesthesia    Neurogenic bladder     Obstructive sleep apnea     Overactive bladder     PONV (postoperative nausea and vomiting)     Pulmonary embolism (HCC)     few yrs ago has vena cava filter    PVD (peripheral vascular disease) (HCC)     Schizoaffective disorder (HCC)     Seizure disorder (HCC)     stares    Seizures (HCC)     Thyroid disease     x30 years    Type 2 diabetes mellitus without complication (HCC)     Type 2 diabetes mellitus, without long-term current use of insulin ([facility] [address] 2018     Precautions:  LSO, spinal precautions, falls  Equipment Needs:  None noted has fww in her room    Pt lives [place] reports all 1 level but unable to name facility. Used fww for mobility PTA. Equipment owned: fww which is present in the room. Hospital Bed.       Initial Evaluation  Date: 18 Treatment Short Term/ Long Term   Goals   AM-PAC 6 Clicks 85/18     Was pt agreeable to Eval/treatment? yes     Does pt have pain? Yes 7/10 pre eval  6/10 post.      Bed Mobility  Rolling: Max a  Supine to sit: max A  Sit to supine: NT  Scooting: Max a  Rolling: ind  Supine to sit: Ind  Sit to supine: Ind  Scooting: Ind   Transfers Sit to stand: Mod a  Stand to sit: Mod a  Stand pivot: Mod a  Sit to stand: Mod Ind  Stand to sit: Mod Ind  Stand pivot: Mod INd   Ambulation    14 feet with  Min A with use of fww for support. >100 feet with  fww Mod Ind   Stair negotiation: ascended and descended  NT  Not barrier to D/C.   ROM BUE:  WFL  BLE:  WFL     Strength BLE:  4-/5  4+/5   Balance Sitting EOB:  SBA  Dynamic Standing: Mod A  Sitting EOB:  Ind  Dynamic Standing: Mod Ind     Pt is A & O x 3 with decreased response time. Sensation:  Pt denies numbness and tingling to extremities  Edema:  none    Patient education  Pt educated on application of LSO and spinal precautions. Patient response to education:   Pt verbalized understanding Pt demonstrated skill Pt requires further education in this area   yes no yes     Comments:  Patient was seen this date for PT evaluation. Results of the functional assessment are noted above. Upon entering the room patient was found in supine position in bed. LSO was applied with collaboration of OT. Bed mobility completed maintaining spinal precautions. Transfers and gait required use of fww for support. Cues provided for sequencing. Distance was self limited by patient reporting decreased BLE strength. Following assessment patient up in bedside chair with RN made aware. Call light placed within reach. This patient can benefit from the continuation of skilled PT to maximize functional level and return to PLOF. Pts/ family goals   1. Get stronger and walk better. Patient and or family understand(s) diagnosis, prognosis, and plan of care. yes    PLAN  PT care will be provided in accordance with the objectives noted above.   Whenever appropriate, clear

## 2018-07-17 NOTE — PROGRESS NOTES
Hospitalist Progress Note      PCP: Arti Gomes    Date of Admission: 7/16/2018    Chief Complaint: medical management    Hospital Course:   59 y.o. female who we are asked to see/evaluate by Neymar Lincoln MD for medical management. Admitted to neurosurgery following posterior lumbar fusion L3-S1. She had previous laminectomy in 2009 which provided some relief at the time. She has failed conservative therapy with PT and pain management with injections. She has PMH of bipolar, CKD, COPD, PVD, hypothyroid, HLD, seizures, EMILEE, DM. She has history of MRSA after her last back surgery. [facility] consulted for medical management  7/17/18: POD 1. BGL controlled with ISS. Subjective:    Complaining of back pain. Denies numbness or tingling in legs.      Medications:  Reviewed    Infusion Medications   Scheduled Medications    albuterol  2.5 mg Nebulization BID    ARIPiprazole  30 mg Oral Daily    benztropine  0.5 mg Oral TID    Crisaborole  1 applicator Apply externally Daily    fluPHENAZine HCl  10 mg Oral Daily    fluPHENAZine HCl  25 mg Oral Nightly    gabapentin  100 mg Oral TID    levothyroxine  25 mcg Oral Daily    LORazepam  0.5 mg Oral BID    lubiprostone  24 mcg Oral Daily with breakfast    melatonin  3 mg Oral Nightly    oxybutynin  5 mg Oral TID    polycarbophil  625 mg Oral Daily    famotidine  20 mg Oral Daily    topiramate  100 mg Oral BID    venlafaxine  150 mg Oral Daily    sodium chloride flush  10 mL Intravenous 2 times per day    docusate sodium  100 mg Oral BID    ceFAZolin (ANCEF) IVPB  2 g Intravenous Q8H     PRN Meds: oxyCODONE-acetaminophen, albuterol sulfate HFA, bisacodyl, bismuth subsalicylate, aluminum & magnesium hydroxide-simethicone, calcium carbonate, loperamide, magnesium hydroxide, nitroGLYCERIN, polyethylene glycol, polyvinyl alcohol, sodium chloride flush, acetaminophen, ondansetron, morphine **OR** morphine, cyclobenzaprine      Intake/Output Summary (Last 24 hours) at 07/17/18 0918  Last data filed at 07/17/18 0646   Gross per 24 hour   Intake             2508 ml   Output             1800 ml   Net              708 ml       Exam:    BP (!) 104/49   Pulse 70   Temp 99.3 °F (37.4 °C) (Temporal)   Resp 16   Ht 5' 6\" (1.676 m)   Wt 230 lb (104.3 kg)   SpO2 97%   BMI 37.12 kg/m²     General appearance: No apparent distress, appears stated age and cooperative. HEENT: Pupils equal, round, and reactive to light. Conjunctivae/corneas clear. Neck: Supple, with full range of motion. No jugular venous distention. Trachea midline. Respiratory:  Normal respiratory effort. Clear to auscultation, bilaterally without Rales/Wheezes/Rhonchi. Cardiovascular: Regular rate and rhythm with normal S1/S2 without murmurs, rubs or gallops. Abdomen: Soft, non-tender, non-distended with normal bowel sounds. Musculoskeletal: No clubbing, cyanosis or edema bilaterally. Full range of motion without deformity. Skin: Skin color, texture, turgor normal.  No rashes or lesions. Neurologic:  Neurovascularly intact without any focal sensory/motor deficits. Psychiatric: Alert and oriented, thought content appropriate, normal insight  Capillary Refill: Brisk,< 3 seconds   Peripheral Pulses: +2 palpable, equal bilaterally       Labs:   No results for input(s): WBC, HGB, HCT, PLT in the last 72 hours.   Recent Labs      07/17/18   0523   NA  140   K  4.0   CL  106   CO2  23   BUN  10   CREATININE  0.9   CALCIUM  8.4*     Assessment/Plan:    Active Hospital Problems    Diagnosis Date Noted    Degenerative lumbar spinal stenosis [M48.061] 07/16/2018    Type 2 diabetes mellitus, without long-term current use of insulin ([facility] [address] [E11.9] 07/16/2018    Obesity (BMI 35.0-39.9 without comorbidity) [E66.9] 02/25/2013    Hyperlipemia [E78.5] 04/10/2012    Bipolar 2 disorder ([facility] [address] [F31.81] 02/17/2011    Hypothyroid [E03.9] 02/17/2011     Lumbar fusion  - management per primary team  -

## 2018-07-17 NOTE — OP NOTE
[address]                   [address] [facility],  [facility]                                 OPERATIVE REPORT    PATIENT NAME: Paola Conn                    :        1955  MED REC NO:   [id number]                            ROOM:       5217  ACCOUNT NO:   [de-identified]                           ADMIT DATE: 2018  PROVIDER:     Libra Myrick MD    DATE OF PROCEDURE:  2018    PREOPERATIVE DIAGNOSES:  1. L3-L4 degenerative spondylolisthesis. 2.  L3 to S1 foraminal stenosis. POSTOPERATIVE DIAGNOSES:  1. L3-L4 degenerative spondylolisthesis. 2.  L3 to S1 foraminal stenosis. OPERATIONS PERFORMED:  1.  Bilateral segmental arthrodesis and fusion from L3 to S1 with the use  of bilateral L3, L4, L5, and S1 pedicle screws with the use of locally  harvested autograft plus recombinant BMP-2 (INFUSE) posterolateral fusion  from L3 to S1.  2.  A 360-degree fusion with transforaminal lumbar interbody fusion at  L3-L4, L4-L5, and L5-S1 with the use of Medtronic Capstone PEEK interbody  spacer packed with recombinant BMP-2 (INFUSE). 3.  Bilateral L3-L4, L4-L5, and L5-S1 medial facetectomy and bilateral L3,  L4, L5, and S1 foraminotomy with left-sided L3-L4, L4-L5, and L5-S1  diskectomy. 4.  Use of O-arm assisted navigation for placement of pedicle screws. 5.  Use of free-running EMG to test the pedicle screws. 6.  Use of intraoperative fluoroscopy for placement of Medtronic Capstone  PEEK interbody spacers. ANESTHESIA:  Generalized endotracheal anesthesia. SURGEON:  Libra Myrick M.D.    ASSISTANT:  Michael Raines PA-C.    COMPLICATIONS:  None. ESTIMATED BLOOD LOSS:  400 mL. SPECIMEN:  Disk. OPERATIVE INDICATIONS:  The patient is a 71-year-old lady who presents to  the office complaining of back pain that radiates into her leg.   She had an  MRI that showed she had degenerative spondylolisthesis at L3-L4 with  evidence of stenosis from L3 to S1. She has failed conservative therapy  and after risks, benefits, and alternatives were discussed with the  patient, it was determined that she would undergo the above-listed  procedure. OPERATIVE PROCEDURE:  The patient was brought into the operative room. A  time-out was performed, where she was identified by her name, medical  record number, and operative procedure which she was about to undergo. Next, induction of generalized endotracheal anesthesia was then commenced. Upon completion of induction of generalized endotracheal anesthesia, she  received preoperative antibiotics. Valencia catheter was placed. Monitoring  electrodes were placed into the muscle groups in her lower extremity for  free-running EMG testing. She was then flipped into prone position on a  Kameron table. All pressure points were padded. Her lumbosacral region  was then prepped and draped in a usual sterile fashion. After this was  done, a #10 blade was used to make the skin incision. Monopolar cautery  was used to dissect through the subcutaneous tissue. I placed a  self-retaining Weitlaner retractor into the wound. I then opened up the  lumbodorsal fascia sharply with monopolar cautery, and I then proceeded to  expose what was the left of the spinous processes at L3 and L4 and also the  spinous processes at S1. Next, I proceeded to then perform a subperiosteal  dissection to then expose the facets bilaterally at L3-L4, L4-L5, and L5-S1  and also the transverse processes bilaterally at L3, L4, and L5 in the  sacral ala. After this was done, I attached the O-arm reference frame into  the S1 spinous process. Using O-arm assisted navigation, I placed  bilateral L3, L4, L5, and S1 pedicle screws. I then used a hand-held  stimulator to test the pedicle screw heads. There was no evidence of  pedicle breach.   I then performed another O-arm spin that showed that the  screws were within the pedicles and after this was done, I then used a  high-speed moira to then thin out the facets bilaterally at L3-L4, L4-L5,  and L5-S1. I then used a #4 Kerrison punch to then perform bilateral  L3-L4, L4-L5, and L5-S1 medial facetectomy. I then subsequently used a  dental tool to palpate neural foramina. I used a #3 Kerrison punch to  perform bilateral L3, L4, L5 and S1 foraminotomy. After this was done, I  then identified the L3-L4 disk space on the patient's left side. I  retracted thecal sac medially and performed an annulotomy with a #11 blade. I removed the disk material with pituitary rongeurs and curettes. I then  inserted #8 shaver that was rotated to 360 degrees, and once I completed  the diskectomy, I placed an 8 x 26 Medtronic Capstone trial followed by an  8 x 26 Medtronic Capstone PEEK interbody spacer packed with recombinant  BMP-2 (INFUSE). I then subsequently identified the L4-L5 disk space. I  retracted the thecal sac medially and performed an annulotomy with a #11  blade. I then removed the disk material with pituitary rongeurs and  curettes. I then inserted a #8 shaver that was rotated 360 degrees and  once this was completed, I then proceeded to then complete the diskectomy  and I placed an 8 x 26 Medtronic Capstone trial followed by an 8 x 26  Medtronic Capstone PEEK interbody spacer packed with recombinant BMP-2  (INFUSE). Next, I then identified the L5-S1 disk space on the left,  retracted the thecal sac medially and performed an annulotomy with a #11  blade. I removed the disk material with pituitary rongeurs and curettes  and I then inserted a #8 shaver that was rotated 360 degrees and once I  completed the diskectomy, I inserted an 8 x 26 Medtronic Capstone trial  followed by an 8 x 26 Medtronic Capstone PEEK interbody spacer packed with  recombinant BMP-2 (INFUSE). After this was done, I used intraoperative  fluoroscopy to inspect the construct and it appeared sound.   I then placed  the rods into the

## 2018-07-18 LAB — METER GLUCOSE: 114 MG/DL (ref 70–110)

## 2018-07-18 PROCEDURE — 6370000000 HC RX 637 (ALT 250 FOR IP): Performed by: NEUROLOGICAL SURGERY

## 2018-07-18 PROCEDURE — 6360000002 HC RX W HCPCS: Performed by: NEUROLOGICAL SURGERY

## 2018-07-18 PROCEDURE — 1200000000 HC SEMI PRIVATE

## 2018-07-18 PROCEDURE — 6370000000 HC RX 637 (ALT 250 FOR IP): Performed by: PHYSICIAN ASSISTANT

## 2018-07-18 PROCEDURE — 6370000000 HC RX 637 (ALT 250 FOR IP): Performed by: CLINICAL NURSE SPECIALIST

## 2018-07-18 PROCEDURE — 97530 THERAPEUTIC ACTIVITIES: CPT

## 2018-07-18 PROCEDURE — 82962 GLUCOSE BLOOD TEST: CPT

## 2018-07-18 PROCEDURE — 94640 AIRWAY INHALATION TREATMENT: CPT

## 2018-07-18 PROCEDURE — 94150 VITAL CAPACITY TEST: CPT

## 2018-07-18 PROCEDURE — 2580000003 HC RX 258: Performed by: NEUROLOGICAL SURGERY

## 2018-07-18 RX ORDER — BISACODYL 10 MG
10 SUPPOSITORY, RECTAL RECTAL ONCE
Status: COMPLETED | OUTPATIENT
Start: 2018-07-18 | End: 2018-07-18

## 2018-07-18 RX ADMIN — BENZTROPINE MESYLATE 0.5 MG: 0.5 TABLET ORAL at 19:56

## 2018-07-18 RX ADMIN — FLUPHENAZINE HYDROCHLORIDE 25 MG: 5 TABLET, FILM COATED ORAL at 19:55

## 2018-07-18 RX ADMIN — LORAZEPAM 0.5 MG: 0.5 TABLET ORAL at 16:19

## 2018-07-18 RX ADMIN — Medication 10 ML: at 08:27

## 2018-07-18 RX ADMIN — ALBUTEROL SULFATE 2.5 MG: 2.5 SOLUTION RESPIRATORY (INHALATION) at 15:54

## 2018-07-18 RX ADMIN — MAGESIUM CITRATE 296 ML: 1.75 LIQUID ORAL at 08:26

## 2018-07-18 RX ADMIN — BISACODYL 10 MG: 10 SUPPOSITORY RECTAL at 13:54

## 2018-07-18 RX ADMIN — FAMOTIDINE 20 MG: 20 TABLET, FILM COATED ORAL at 08:25

## 2018-07-18 RX ADMIN — LEVOTHYROXINE SODIUM 25 MCG: 25 TABLET ORAL at 08:24

## 2018-07-18 RX ADMIN — ARIPIPRAZOLE 30 MG: 15 TABLET ORAL at 08:25

## 2018-07-18 RX ADMIN — GABAPENTIN 100 MG: 100 CAPSULE ORAL at 19:57

## 2018-07-18 RX ADMIN — Medication 10 ML: at 06:54

## 2018-07-18 RX ADMIN — CEFAZOLIN SODIUM 2 G: 2 SOLUTION INTRAVENOUS at 06:53

## 2018-07-18 RX ADMIN — DOCUSATE SODIUM 100 MG: 100 CAPSULE, LIQUID FILLED ORAL at 19:56

## 2018-07-18 RX ADMIN — DOCUSATE SODIUM 100 MG: 100 CAPSULE, LIQUID FILLED ORAL at 08:24

## 2018-07-18 RX ADMIN — LUBIPROSTONE 24 MCG: 24 CAPSULE, GELATIN COATED ORAL at 08:25

## 2018-07-18 RX ADMIN — MAGESIUM CITRATE 296 ML: 1.75 LIQUID ORAL at 10:55

## 2018-07-18 RX ADMIN — OXYCODONE HYDROCHLORIDE AND ACETAMINOPHEN 2 TABLET: 5; 325 TABLET ORAL at 03:50

## 2018-07-18 RX ADMIN — CALCIUM POLYCARBOPHIL 625 MG: 625 TABLET, FILM COATED ORAL at 08:25

## 2018-07-18 RX ADMIN — GABAPENTIN 100 MG: 100 CAPSULE ORAL at 13:54

## 2018-07-18 RX ADMIN — BENZTROPINE MESYLATE 0.5 MG: 0.5 TABLET ORAL at 08:24

## 2018-07-18 RX ADMIN — OXYCODONE HYDROCHLORIDE AND ACETAMINOPHEN 2 TABLET: 5; 325 TABLET ORAL at 09:12

## 2018-07-18 RX ADMIN — TOPIRAMATE 100 MG: 100 TABLET, FILM COATED ORAL at 19:56

## 2018-07-18 RX ADMIN — FLUPHENAZINE HYDROCHLORIDE 10 MG: 5 TABLET, FILM COATED ORAL at 08:26

## 2018-07-18 RX ADMIN — MELATONIN 3 MG ORAL TABLET 3 MG: 3 TABLET ORAL at 19:55

## 2018-07-18 RX ADMIN — OXYBUTYNIN CHLORIDE 5 MG: 5 TABLET ORAL at 19:56

## 2018-07-18 RX ADMIN — OXYBUTYNIN CHLORIDE 5 MG: 5 TABLET ORAL at 13:54

## 2018-07-18 RX ADMIN — OXYCODONE HYDROCHLORIDE AND ACETAMINOPHEN 2 TABLET: 5; 325 TABLET ORAL at 19:57

## 2018-07-18 RX ADMIN — LORAZEPAM 0.5 MG: 0.5 TABLET ORAL at 08:31

## 2018-07-18 RX ADMIN — VENLAFAXINE HYDROCHLORIDE 150 MG: 150 CAPSULE, EXTENDED RELEASE ORAL at 08:25

## 2018-07-18 RX ADMIN — BENZTROPINE MESYLATE 0.5 MG: 0.5 TABLET ORAL at 13:55

## 2018-07-18 RX ADMIN — GABAPENTIN 100 MG: 100 CAPSULE ORAL at 08:24

## 2018-07-18 RX ADMIN — TOPIRAMATE 100 MG: 100 TABLET, FILM COATED ORAL at 08:23

## 2018-07-18 RX ADMIN — OXYBUTYNIN CHLORIDE 5 MG: 5 TABLET ORAL at 08:23

## 2018-07-18 RX ADMIN — ALBUTEROL SULFATE 2.5 MG: 2.5 SOLUTION RESPIRATORY (INHALATION) at 07:32

## 2018-07-18 ASSESSMENT — PAIN DESCRIPTION - PAIN TYPE
TYPE: SURGICAL PAIN
TYPE: ACUTE PAIN
TYPE: SURGICAL PAIN

## 2018-07-18 ASSESSMENT — PAIN DESCRIPTION - FREQUENCY
FREQUENCY: CONTINUOUS
FREQUENCY: CONTINUOUS

## 2018-07-18 ASSESSMENT — PAIN SCALES - GENERAL
PAINLEVEL_OUTOF10: 10
PAINLEVEL_OUTOF10: 8
PAINLEVEL_OUTOF10: 0
PAINLEVEL_OUTOF10: 6
PAINLEVEL_OUTOF10: 9

## 2018-07-18 ASSESSMENT — PAIN DESCRIPTION - DESCRIPTORS
DESCRIPTORS: ACHING;SORE;PRESSURE;DISCOMFORT
DESCRIPTORS: ACHING;SORE;TENDER
DESCRIPTORS: ACHING;DISCOMFORT;SORE

## 2018-07-18 ASSESSMENT — PAIN DESCRIPTION - ORIENTATION
ORIENTATION: MID;LOWER
ORIENTATION: LOWER
ORIENTATION: MID;LOWER

## 2018-07-18 ASSESSMENT — PAIN DESCRIPTION - LOCATION
LOCATION: BACK

## 2018-07-18 ASSESSMENT — PAIN DESCRIPTION - ONSET
ONSET: ON-GOING
ONSET: ON-GOING

## 2018-07-18 ASSESSMENT — PAIN DESCRIPTION - PROGRESSION: CLINICAL_PROGRESSION: NOT CHANGED

## 2018-07-18 NOTE — PROGRESS NOTES
polyvinyl alcohol, sodium chloride flush, acetaminophen, ondansetron, morphine **OR** morphine, cyclobenzaprine      Intake/Output Summary (Last 24 hours) at 07/18/18 1441  Last data filed at 07/18/18 0646   Gross per 24 hour   Intake               50 ml   Output              280 ml   Net             -230 ml       Exam:    BP (!) 124/59   Pulse 79   Temp 97.8 °F (36.6 °C)   Resp 15   Ht 5' 6\" (1.676 m)   Wt 230 lb (104.3 kg)   SpO2 97%   BMI 37.12 kg/m²     General appearance: No apparent distress, appears stated age and cooperative. HEENT: Pupils equal, round, and reactive to light. Conjunctivae/corneas clear. Neck: Supple, with full range of motion. No jugular venous distention. Trachea midline. Respiratory:  Normal respiratory effort. Clear to auscultation, bilaterally without Rales/Wheezes/Rhonchi. Cardiovascular: Regular rate and rhythm with normal S1/S2 without murmurs, rubs or gallops. Abdomen: Soft, non-tender, distended with decrasedl bowel sounds. Musculoskeletal: No clubbing, cyanosis or edema bilaterally. Full range of motion without deformity. Skin: Skin color, texture, turgor normal.  No rashes or lesions. Neurologic:  Neurovascularly intact without any focal sensory/motor deficits. Psychiatric: Alert and oriented, thought content appropriate, normal insight  Capillary Refill: Brisk,< 3 seconds   Peripheral Pulses: +2 palpable, equal bilaterally       Labs:   No results for input(s): WBC, HGB, HCT, PLT in the last 72 hours.   Recent Labs      07/17/18   0523   NA  140   K  4.0   CL  106   CO2  23   BUN  10   CREATININE  0.9   CALCIUM  8.4*     Assessment/Plan:    Active Hospital Problems    Diagnosis Date Noted    Pre-operative laboratory examination [T78.579]     Degenerative lumbar spinal stenosis [M48.061] 07/16/2018    Type 2 diabetes mellitus, without long-term current use of insulin ([facility] [address] [E11.9] 07/16/2018    Obesity (BMI 35.0-39.9 without comorbidity) [E66.9] 02/25/2013    Hyperlipemia [E78.5] 04/10/2012    B[facility] [F31.81] 02/17/2011    Hypothyroid [E03.9] 02/17/2011     Lumbar fusion  - management per primary team  - pain control  - monitor bowel function     DM  - monitor BGL  - hold metformin, restart tomorrow     Thyroid disease  - resume home medications     HLD  - resume home medications    Constipation  - MOM, mag citrate     Obesity    DVT Prophylaxis: SCDs  Diet: DIET CARB CONTROL;  Code Status: Full Code    PT/OT Eval Status: active and ongoing    Dispo - per primary    JAM Conner - CNS

## 2018-07-19 VITALS
SYSTOLIC BLOOD PRESSURE: 117 MMHG | RESPIRATION RATE: 16 BRPM | TEMPERATURE: 97.7 F | WEIGHT: 230 LBS | HEIGHT: 66 IN | DIASTOLIC BLOOD PRESSURE: 69 MMHG | BODY MASS INDEX: 36.96 KG/M2 | OXYGEN SATURATION: 98 % | HEART RATE: 78 BPM

## 2018-07-19 LAB
METER GLUCOSE: 109 MG/DL (ref 70–110)
METER GLUCOSE: 115 MG/DL (ref 70–110)

## 2018-07-19 PROCEDURE — 97530 THERAPEUTIC ACTIVITIES: CPT

## 2018-07-19 PROCEDURE — 94640 AIRWAY INHALATION TREATMENT: CPT

## 2018-07-19 PROCEDURE — 6370000000 HC RX 637 (ALT 250 FOR IP): Performed by: NEUROLOGICAL SURGERY

## 2018-07-19 PROCEDURE — 82962 GLUCOSE BLOOD TEST: CPT

## 2018-07-19 PROCEDURE — 97535 SELF CARE MNGMENT TRAINING: CPT

## 2018-07-19 PROCEDURE — 6370000000 HC RX 637 (ALT 250 FOR IP): Performed by: CLINICAL NURSE SPECIALIST

## 2018-07-19 PROCEDURE — 6370000000 HC RX 637 (ALT 250 FOR IP): Performed by: PHYSICIAN ASSISTANT

## 2018-07-19 PROCEDURE — 6360000002 HC RX W HCPCS: Performed by: NEUROLOGICAL SURGERY

## 2018-07-19 PROCEDURE — 2580000003 HC RX 258: Performed by: NEUROLOGICAL SURGERY

## 2018-07-19 RX ORDER — CYCLOBENZAPRINE HCL 10 MG
10 TABLET ORAL 3 TIMES DAILY PRN
Qty: 21 TABLET | Refills: 0 | Status: SHIPPED | OUTPATIENT
Start: 2018-07-19 | End: 2018-07-26

## 2018-07-19 RX ORDER — OXYCODONE HYDROCHLORIDE AND ACETAMINOPHEN 5; 325 MG/1; MG/1
2 TABLET ORAL EVERY 4 HOURS PRN
Qty: 84 TABLET | Refills: 0 | Status: SHIPPED | OUTPATIENT
Start: 2018-07-19 | End: 2018-07-26

## 2018-07-19 RX ORDER — DOCUSATE SODIUM 100 MG/1
100 CAPSULE, LIQUID FILLED ORAL 3 TIMES DAILY PRN
Qty: 15 CAPSULE | Refills: 0 | Status: SHIPPED | OUTPATIENT
Start: 2018-07-19 | End: 2018-07-24

## 2018-07-19 RX ORDER — GABAPENTIN 100 MG/1
100 CAPSULE ORAL 3 TIMES DAILY
Qty: 21 CAPSULE | Refills: 3 | Status: SHIPPED | OUTPATIENT
Start: 2018-07-19 | End: 2018-12-03

## 2018-07-19 RX ADMIN — OXYCODONE HYDROCHLORIDE AND ACETAMINOPHEN 2 TABLET: 5; 325 TABLET ORAL at 16:37

## 2018-07-19 RX ADMIN — OXYBUTYNIN CHLORIDE 5 MG: 5 TABLET ORAL at 14:05

## 2018-07-19 RX ADMIN — OXYCODONE HYDROCHLORIDE AND ACETAMINOPHEN 2 TABLET: 5; 325 TABLET ORAL at 09:37

## 2018-07-19 RX ADMIN — ARIPIPRAZOLE 30 MG: 15 TABLET ORAL at 09:36

## 2018-07-19 RX ADMIN — OXYCODONE HYDROCHLORIDE AND ACETAMINOPHEN 2 TABLET: 5; 325 TABLET ORAL at 02:59

## 2018-07-19 RX ADMIN — BENZTROPINE MESYLATE 0.5 MG: 0.5 TABLET ORAL at 14:05

## 2018-07-19 RX ADMIN — VENLAFAXINE HYDROCHLORIDE 150 MG: 150 CAPSULE, EXTENDED RELEASE ORAL at 09:35

## 2018-07-19 RX ADMIN — LUBIPROSTONE 24 MCG: 24 CAPSULE, GELATIN COATED ORAL at 09:36

## 2018-07-19 RX ADMIN — Medication 10 ML: at 02:59

## 2018-07-19 RX ADMIN — LEVOTHYROXINE SODIUM 25 MCG: 25 TABLET ORAL at 09:35

## 2018-07-19 RX ADMIN — OXYBUTYNIN CHLORIDE 5 MG: 5 TABLET ORAL at 09:36

## 2018-07-19 RX ADMIN — LORAZEPAM 0.5 MG: 0.5 TABLET ORAL at 09:36

## 2018-07-19 RX ADMIN — Medication 10 ML: at 09:37

## 2018-07-19 RX ADMIN — FLUPHENAZINE HYDROCHLORIDE 10 MG: 5 TABLET, FILM COATED ORAL at 09:36

## 2018-07-19 RX ADMIN — GABAPENTIN 100 MG: 100 CAPSULE ORAL at 09:36

## 2018-07-19 RX ADMIN — CEFAZOLIN SODIUM 2 G: 2 SOLUTION INTRAVENOUS at 02:59

## 2018-07-19 RX ADMIN — TOPIRAMATE 100 MG: 100 TABLET, FILM COATED ORAL at 09:35

## 2018-07-19 RX ADMIN — CALCIUM POLYCARBOPHIL 625 MG: 625 TABLET, FILM COATED ORAL at 09:35

## 2018-07-19 RX ADMIN — CEFAZOLIN SODIUM 2 G: 2 SOLUTION INTRAVENOUS at 11:25

## 2018-07-19 RX ADMIN — GABAPENTIN 100 MG: 100 CAPSULE ORAL at 14:05

## 2018-07-19 RX ADMIN — FAMOTIDINE 20 MG: 20 TABLET, FILM COATED ORAL at 09:35

## 2018-07-19 RX ADMIN — BENZTROPINE MESYLATE 0.5 MG: 0.5 TABLET ORAL at 09:36

## 2018-07-19 RX ADMIN — DOCUSATE SODIUM 100 MG: 100 CAPSULE, LIQUID FILLED ORAL at 09:37

## 2018-07-19 RX ADMIN — METFORMIN HYDROCHLORIDE 500 MG: 500 TABLET ORAL at 09:36

## 2018-07-19 RX ADMIN — ALBUTEROL SULFATE 2.5 MG: 2.5 SOLUTION RESPIRATORY (INHALATION) at 04:52

## 2018-07-19 RX ADMIN — CYCLOBENZAPRINE 10 MG: 10 TABLET, FILM COATED ORAL at 10:53

## 2018-07-19 ASSESSMENT — PAIN DESCRIPTION - ORIENTATION: ORIENTATION: LOWER;MID

## 2018-07-19 ASSESSMENT — PAIN SCALES - GENERAL
PAINLEVEL_OUTOF10: 8
PAINLEVEL_OUTOF10: 8
PAINLEVEL_OUTOF10: 0
PAINLEVEL_OUTOF10: 7

## 2018-07-19 ASSESSMENT — PAIN DESCRIPTION - PAIN TYPE: TYPE: SURGICAL PAIN

## 2018-07-19 ASSESSMENT — PAIN DESCRIPTION - DESCRIPTORS: DESCRIPTORS: ACHING;DISCOMFORT;SORE

## 2018-07-19 ASSESSMENT — PAIN DESCRIPTION - FREQUENCY: FREQUENCY: CONTINUOUS

## 2018-07-19 ASSESSMENT — PAIN DESCRIPTION - ONSET: ONSET: AWAKENED FROM SLEEP

## 2018-07-19 ASSESSMENT — PAIN DESCRIPTION - LOCATION: LOCATION: BACK

## 2018-07-19 NOTE — PROGRESS NOTES
OT BEDSIDE TREATMENT NOTE      Date:2018  Patient Name: Wicho Dumont  MRN: [id number]  : 1955  Room: [address] [place], [place] [address]     Evaluating OT: Chuck Eden OTR/JENSEN #[id number]     Recommended Adaptive Equipment: LB AE, BSC, w/w, Tub/shower bench.      AM PAC ADL RAW SCORE -  1624   G CODE: CK  Diagnosis: spondylolisthesis,   Surgery: s/p L3-S1 PLIF 18  Past Medical History:   Past Medical History        Past Medical History:   Diagnosis Date    Anxiety      Arrhythmia      Bipolar 2 disorder (HCC)      Chronic back pain      CKD      Constipation      Continuous urine leakage      COPD (chronic obstructive pulmonary disease) (HCC)      Depression      Dysphagia      Environmental allergies      GERD (gastroesophageal reflux disease)      History of cardiovascular stress test 10-     LEXISCAN    History of cardiovascular stress test 2014     lexiscan stress    History of cardiovascular stress test 2018     Lexiscan stress test    Hyperlipidemia      MDRO (multiple drug resistant organisms) resistance       mrsa 3-4 yrs ago with back surgery    Menopausal symptoms       atrophic vaginitis    Mitral valve prolapse      Nausea & vomiting       with anesthesia    Neurogenic bladder      Obstructive sleep apnea      Overactive bladder      PONV (postoperative nausea and vomiting)      Pulmonary embolism (HCC)       few yrs ago has vena cava filter    PVD (peripheral vascular disease) (HCC)      Schizoaffective disorder (HCC)      Seizure disorder (HCC)       stares    Seizures (HCC)      Thyroid disease       x30 years    Type 2 diabetes mellitus without complication (HCC)      Type 2 diabetes mellitus, without long-term current use of insulin ([facility] [address] 2018         Precautions: falls, spinal precautions with LSO brace, hemovac drain    Per OT Eval: Home Living: Pt lives at [address] and does not negotiate steps  Bathroom setup: walk in shower with seat and elevated commode with HR  Equipment owned: ww. [facility] bed  Prior Level of Function: assisted with LE derssing and bathing with ADLs assisted with meals, laundry with IADLs; using ww for ambulation. Driving: no   Medication management:setup  Occupation: enjoys drawing    Pain: Pt c/o back pain 7/10. Cognition: Alert & Oriented x3. Follows 1-2 step commands. Latent response time. Fair safety/problem solving. Goals:  STG #1:  Demo good follow through of spinal  precautions in ADL's and transfers      STG #2:  Increase grooming to SBA in standing/ sitting with ww  STGl #3   Increase UE dressing and bathing to setup             STG #4   Increase LE dressing and bathing to min A with AE prn      STG #5   Increase functional commode transfer to SBA with ww  STG #6   Increase safety and problem solving to fair+ during ADL's  STG #7   Increase functional activity tolerance to good in ADL tasks    Functional Assessment:      Initial Eval Status 7/17/18 Current Status 7/19/18   Feeding  setup Setup  Eating breakfast seated in chair. Grooming  Setup   Min A  Standing at sink to wash hands with assist required for standing balance/safety d/t knee buckling in standing requiring Min A to recover. Upper Body Dressing Mod A   Mod A  Pt lying in bed with LSO donned upon arrival. Pt reports she slept in brace all night. Instructed pt on doffing brace when in supine to maintain skin integrity. Brace not positioned properly therefor therapist doffed and re-donned brace in supine. Pt educated on proper technique to don/doff brace and wear of brace. Lower Body Dressing dependent  Min A  Pt demo'd ability to don socks seated at EOB with sockaid. Pt reports utilizing LB AE PTA. Bathing Max A  Per Eval  Will continue to assess. Toileting  Mod A  Min A  Pt instructed on compensatory strategies to complete toilet hygiene. Pt required assist to manage clothing over hips in standing.     Bed Mobility  Supine to Sit: Max A  Sit to

## 2018-07-19 NOTE — PROGRESS NOTES
Physical Therapy  Facility/Department: [facility] NEURO SP[facility]  Daily Treatment Note  NAME: Jaimee Clinton  : 1955  MRN: [id number]    Date of Service: 2018   Evaluating PT: Miguel Pickett, PT [id number]     Room #:  5112/9941-D  Diagnosis:  Degenerative lumbar spinal stenosis  Surgery: 18 s/p revision L3-S1 fusion  Precautions:  LSO, spinal precautions, falls     Equipment Needs: Leslie Francisco noted has fww in her room     Pt lives [place] reports all 1 level but unable to name facility. Used fww for mobility PTA. Equipment owned: fww which is present in the room. [facility].        Initial Evaluation  Date: 18 Treatment  18 Short Term/ Long Term   Goals   AM-PAC 6 Clicks 93/58 81/68      Was pt agreeable to Eval/treatment? yes Yes      Does pt have pain? Yes 7/10 pre eval  6/10 post.   No c/o pain at rest     Bed Mobility  Rolling: Max a  Supine to sit: max A  Sit to supine: NT  Scooting: Max a  Rolling: [place]  Supine to sit: Max A  Sit to supine: NT  Scooting: Mod A Rolling: ind  Supine to sit: Ind  Sit to supine: Ind  Scooting: Ind   Transfers Sit to stand: Mod a  Stand to sit: Mod a  Stand pivot: Mod a Sit to stand: [address]  Stand to sit: ModA  Stand pivot: [place] with [employer] Sit to stand: Mod Ind  Stand to sit: Mod Ind  Stand pivot: Mod INd   Ambulation    14 feet with  Min A with use of fww for support. 25 feet x 2 with ww Min A  >100 feet with  fww Mod Ind   Stair negotiation: ascended and descended  NT NT  Not barrier to D/C.   ROM BUE:  WFL  BLE:  WFL       Strength BLE:  4-/5   4+/5   Balance Sitting EOB:  SBA  Dynamic Standing:  Mod A Sitting EOB:  SBA  Dynamic Standing: [place] with [employer] Sitting EOB:  Ind  Dynamic Standing:  Mod Ind       Patient education  Pt was educated on log rolling, hand placement for transfers and safety. Patient response to education:   Pt verbalized understanding Pt demonstrated skill Pt requires further education in this area   x x x     Additional Comments: Pt self limiting.

## 2018-07-19 NOTE — PROGRESS NOTES
Department of Neurosurgery  Physician Assistant Progress Note    CHIEF COMPLAINT: Patient POD 3 s/p revision L3-S1 fusion. SUBJECTIVE:  Patient c/o moderate pain, improved. Up in chair. No new extremity pain, weakness, n/t. Brace received.  Nursing reports BM per medical.     ROS:  Denies fevers, chills, or night sweats  Denies chest pain, palpitations, or SOB    OBJECTIVE  Physical  VITALS:  BP (!) 95/51   Pulse 66   Temp 97.7 °F (36.5 °C) (Oral)   Resp 16   Ht 5' 6\" (1.676 m)   Wt 230 lb (104.3 kg)   SpO2 98%   BMI 37.12 kg/m²   NEUROLOGIC:     Awake and alert   No apparent distress   Non-labored breathing   FC x 4 ext, pain noted   Skin warm and dry    Hemovac intact   Incision covered     Data  CBC with Differential:    Lab Results   Component Value Date    WBC 6.2 05/21/2018    RBC 3.95 05/21/2018    HGB 12.2 05/21/2018    HCT 35.9 05/21/2018     05/21/2018    MCV 90.9 05/21/2018    MCH 30.9 05/21/2018    MCHC 34.0 05/21/2018    RDW 13.6 05/21/2018    SEGSPCT 66 02/18/2013    LYMPHOPCT 24.0 05/21/2018    MONOPCT 8.3 05/21/2018    EOSPCT 5 10/15/2010    BASOPCT 0.2 05/21/2018    MONOSABS 0.52 05/21/2018    LYMPHSABS 1.50 05/21/2018    EOSABS 0.25 05/21/2018    BASOSABS 0.01 05/21/2018     Current Inpatient Medications  Current Facility-Administered Medications: ceFAZolin (ANCEF) 2 g in dextrose 3 % 50 mL IVPB (duplex), 2 g, Intravenous, Q8H  metFORMIN (GLUCOPHAGE) tablet 500 mg, 500 mg, Oral, Daily with breakfast  oxyCODONE-acetaminophen (PERCOCET) 5-325 MG per tablet 2 tablet, 2 tablet, Oral, Q4H PRN  albuterol (PROVENTIL) nebulizer solution 2.5 mg, 2.5 mg, Nebulization, BID  albuterol sulfate  (90 Base) MCG/ACT inhaler 2 puff, 2 puff, Inhalation, Q6H PRN  ARIPiprazole (ABILIFY) tablet 30 mg, 30 mg, Oral, Daily  benztropine (COGENTIN) tablet 0.5 mg, 0.5 mg, Oral, TID  bisacodyl (DULCOLAX) suppository 10 mg, 10 mg, Rectal, Daily PRN  bismuth subsalicylate (PEPTO BISMOL) 262 MG/15ML PLAN:  Patient POD 3 s/p revision on L3-S1 fusion.  Stable.   -Pain control and expectations discussed  -IS  -Bowel regimen--enema   -Brace when greater then 45 degrees  -Rehab placement   -Hemovac to be removed today  -Discharge planning, rehab today after enema Complex Repair And Ftsg Text: The defect edges were debeveled with a #15 scalpel blade.  The primary defect was closed partially with a complex linear closure.  Given the location of the defect, shape of the defect and the proximity to free margins a full thickness skin graft was deemed most appropriate to repair the remaining defect.  The graft was trimmed to fit the size of the remaining defect.  The graft was then placed in the primary defect, oriented appropriately, and sutured into place.

## 2018-07-19 NOTE — PROGRESS NOTES
Hospitalist Progress Note      PCP: Margaret Hill    Date of Admission: 7/16/2018    Chief Complaint: medical management    Hospital Course:   59 y.o. female who we are asked to see/evaluate by Jase Dykes MD for medical management. Admitted to neurosurgery following posterior lumbar fusion L3-S1. She had previous laminectomy in 2009 which provided some relief at the time. She has failed conservative therapy with PT and pain management with injections. She has PMH of bipolar, CKD, COPD, PVD, hypothyroid, HLD, seizures, EMILEE, DM. She has history of MRSA after her last back surgery. [facility] consulted for medical management  7/17/18: POD 1. BGL controlled  7/18/18:  POD 2. No BM with MOM, mag citrate. BGL controlled. 7/19/18: POD 3. Per RN, patient had BM yesterday.     Subjective:    No new complaints    Medications:  Reviewed    Infusion Medications   Scheduled Medications    ceFAZolin (ANCEF) IVPB  2 g Intravenous Q8H    metFORMIN  500 mg Oral Daily with breakfast    albuterol  2.5 mg Nebulization BID    ARIPiprazole  30 mg Oral Daily    benztropine  0.5 mg Oral TID    Crisaborole  1 applicator Apply externally Daily    fluPHENAZine HCl  10 mg Oral Daily    fluPHENAZine HCl  25 mg Oral Nightly    gabapentin  100 mg Oral TID    levothyroxine  25 mcg Oral Daily    LORazepam  0.5 mg Oral BID    lubiprostone  24 mcg Oral Daily with breakfast    melatonin  3 mg Oral Nightly    oxybutynin  5 mg Oral TID    polycarbophil  625 mg Oral Daily    famotidine  20 mg Oral Daily    topiramate  100 mg Oral BID    venlafaxine  150 mg Oral Daily    sodium chloride flush  10 mL Intravenous 2 times per day    docusate sodium  100 mg Oral BID     PRN Meds: oxyCODONE-acetaminophen, albuterol sulfate HFA, bisacodyl, bismuth subsalicylate, aluminum & magnesium hydroxide-simethicone, calcium carbonate, loperamide, magnesium hydroxide, nitroGLYCERIN, polyethylene glycol, polyvinyl alcohol, sodium 04/10/2012    Bipolar 2 disorder ([facility] [address] [F31.81] 02/17/2011    Hypothyroid [E03.9] 02/17/2011     Lumbar fusion  - management per primary team  - pain control  - monitor bowel function     DM  - monitor BGL  - hold metformin, restart tomorrow  - stable     Thyroid disease  - resume home medications  - stable     HLD  - resume home medications  - stable    Constipation  - MOM, mag citrate  - BM yesterday  - resolving     Obesity    DVT Prophylaxis: SCDs  Diet: DIET CARB CONTROL;  Code Status: Full Code    PT/OT Eval Status: active and ongoing    Dispo - per primary    Sisto Blind, APRN - CNS

## 2018-07-27 NOTE — DISCHARGE SUMMARY
Discharge [address] [facility] Department of Neurosurgery    Patient: Dao Perez   YOB: 1955  MRN: [id number]    Admission Date: 7/16/2018  Discharge Date: 7/19/2018    Primary Diagnosis:   1. L3-L4 degenerative spondylolisthesis. 2.  L3 to S1 foraminal stenosis. Other Diagnoses:      Diagnosis Date    Anxiety     Arrhythmia     Bipolar 2 disorder ([address]     Chronic back pain     CKD     Constipation     Continuous urine leakage     COPD (chronic obstructive pulmonary disease) (HCC)     Depression     Dysphagia     Environmental allergies     GERD (gastroesophageal reflux disease)     History of cardiovascular stress test 10-    LEXISCAN    History of cardiovascular stress test 06/2014    lexiscan stress    History of cardiovascular stress test 05/22/2018    Lexiscan stress test    Hyperlipidemia     MDRO (multiple drug resistant organisms) resistance     mrsa 3-4 yrs ago with back surgery    Menopausal symptoms     atrophic vaginitis    Mitral valve prolapse     Nausea & vomiting     with anesthesia    Neurogenic bladder     Obstructive sleep apnea     Overactive bladder     PONV (postoperative nausea and vomiting)     Pulmonary embolism (HCC)     few yrs ago has vena cava filter    [facility]VD [facility]per[facility] disease) ([facility]     Schizoaffective disorder ([facility])     Seizure disorder ([facility])     stares    Seizures ([address]     Thyroid disease     x30 years    Type 2 diabetes mellitus without complication ([address]     Type 2 diabetes mellitus, without long-term current use of insulin ([address] 7/16/2018       Principal Procedure:   1.   Bilateral segmental arthrodesis and fusion from L3 to S1 with the use  of bilateral L3, L4, L5, and S1 pedicle screws with the use of locally  harvested autograft plus recombinant BMP-2 (INFUSE) posterolateral fusion  from L3 to S1.  2.  A 360-degree fusion with transforaminal lumbar interbody fusion at  L3-L4, L4-L5, and L5-S1 with the use of

## 2018-08-06 DIAGNOSIS — M54.5 ACUTE LOW BACK PAIN, UNSPECIFIED BACK PAIN LATERALITY, WITH SCIATICA PRESENCE UNSPECIFIED: Primary | ICD-10-CM

## 2018-08-15 ENCOUNTER — OFFICE VISIT (OUTPATIENT)
Dept: NEUROSURGERY | Age: 63
End: 2018-08-15

## 2018-08-15 VITALS — HEART RATE: 60 BPM | DIASTOLIC BLOOD PRESSURE: 63 MMHG | SYSTOLIC BLOOD PRESSURE: 122 MMHG

## 2018-08-15 DIAGNOSIS — M48.061 DEGENERATIVE LUMBAR SPINAL STENOSIS: Primary | ICD-10-CM

## 2018-08-15 PROCEDURE — 99024 POSTOP FOLLOW-UP VISIT: CPT | Performed by: PHYSICIAN ASSISTANT

## 2018-08-15 RX ORDER — OXYCODONE AND ACETAMINOPHEN 10; 325 MG/1; MG/1
1 TABLET ORAL EVERY 4 HOURS PRN
COMMUNITY
End: 2018-11-15 | Stop reason: ALTCHOICE

## 2018-08-15 RX ORDER — CYCLOBENZAPRINE HCL 10 MG
10 TABLET ORAL 3 TIMES DAILY PRN
COMMUNITY
End: 2018-11-05

## 2018-08-15 NOTE — PATIENT INSTRUCTIONS
Patient Education        Lumbar Spinal Fusion: What to Expect at Home  Your Recovery    After surgery, you can expect your back to feel stiff and sore. You may have trouble sitting or standing in one position for very long and may need pain medicine in the weeks after your surgery. It may take 4 to 6 weeks to get back to doing simple activities, such as light housework. It may take 6 months to a year for your back to get better completely. You may need to wear a back brace while your back heals. And your doctor may have you go to physical therapy. If your job doesn't require physical labor, you will probably be able to go back to work after 1 or 2 months. If your job involves light physical labor, it may take 3 to 6 months. Most people whose jobs involved heavy labor can never return to those jobs. This care sheet gives you a general idea about how long it will take for you to recover. But each person recovers at a different pace. Follow the steps below to get better as quickly as possible. How can you care for yourself at home? Activity    · Rest when you feel tired. Getting enough sleep will help you recover.     · Try to walk each day. Start by walking a little more than you did the day before. Bit by bit, increase the amount you walk. Walking boosts blood flow and helps prevent pneumonia and constipation. Walking may also decrease your muscle soreness after surgery.     · If advised by your doctor, you may need to avoid lifting anything that would cause excessive strain on your back.  This may include a child, heavy grocery bags and milk containers, a heavy briefcase or backpack, cat litter or dog food bags, or a vacuum .     · Avoid strenuous activities, such as bicycle riding, jogging, weight lifting, or aerobic exercise, until your doctor says it is okay.     · Do not drive for 2 to 4 weeks after your surgery or until your doctor says it is okay.     · Avoid riding in a car for more than 30 please click on the \"Sign Up Now\" link. Current as of: November 29, 2017  Content Version: 11.7  © 5706-7213 [employer]. Care instructions adapted under license by [facility] ([facility]). If you have questions about a medical condition or this instruction, always ask your healthcare professional. [address] any warranty or liability for your use of this information.

## 2018-08-15 NOTE — LETTER
[facility] Neurosurgery  [address]  Phone: [phone]  Fax: [address] [place], [place]        August 15, 2018     Natividad Manriquez  [facility] [address]    Patient: Pan Ortiz  MR Number: [id number]  YOB: 1955  Date of Visit: 8/15/2018    Dear Dr. Natividad Manriquez:    Thank you for the request for consultation for Shai Christianson to me for the evaluation. Below are the relevant portions of my assessment and plan of care. Vitals:    08/15/18 1158   BP: 122/63   Pulse: 60     Post-Operative Follow-up    This is a 59year old white female who presents to the office for a 1 month visit s/p L3/4, L4/5, L5/S1 PLIF. Subjective: Patient state she is \"ok\". C/o moderate back pain and some left leg pain into the groin and knee. Ambulating with walker at facility, is participating int PT. Brace compliance. No new LE weakness. No family present. Physical Exam:   Obese, no apparent distress   Non-labored breathing    Vitals Stable   A&O x 3   FC x 4 ext   Pupils equal in size   EOMI   5/5 in UE bilaterally   4/5 in LE bilaterally   Sensation to light touch intact bilaterally   Wound clean and dry    Imaging: Lumbar x-rays reviewed. Assessment: Patient 1 month s/p 3 level PLIF. Stable. Plan:  -X-rays reviewed  -Pain control and expectations discussed  -Continue PT for legs  -20-30lbs weight restriction  -Brace when greater then 45 degrees x 2 months  -RTC in 2 months with x-rays       If you have questions, please do not hesitate to call me. I look forward to following Bertrand Neely along with you.     Sincerely,        ANSELMO Hurtado

## 2018-10-04 DIAGNOSIS — M54.5 ACUTE LOW BACK PAIN, UNSPECIFIED BACK PAIN LATERALITY, WITH SCIATICA PRESENCE UNSPECIFIED: ICD-10-CM

## 2018-10-10 ENCOUNTER — OFFICE VISIT (OUTPATIENT)
Dept: NEUROSURGERY | Age: 63
End: 2018-10-10

## 2018-10-10 VITALS — SYSTOLIC BLOOD PRESSURE: 120 MMHG | HEART RATE: 71 BPM | DIASTOLIC BLOOD PRESSURE: 72 MMHG

## 2018-10-10 DIAGNOSIS — M43.16 SPONDYLOLISTHESIS AT L3-L4 LEVEL: ICD-10-CM

## 2018-10-10 DIAGNOSIS — M54.5 ACUTE LOW BACK PAIN, UNSPECIFIED BACK PAIN LATERALITY, WITH SCIATICA PRESENCE UNSPECIFIED: Primary | ICD-10-CM

## 2018-10-10 DIAGNOSIS — M79.604 PAIN OF RIGHT LOWER EXTREMITY: ICD-10-CM

## 2018-10-10 PROCEDURE — 99024 POSTOP FOLLOW-UP VISIT: CPT | Performed by: PHYSICIAN ASSISTANT

## 2018-10-10 RX ORDER — PANTOPRAZOLE SODIUM 40 MG/1
40 GRANULE, DELAYED RELEASE ORAL
COMMUNITY
End: 2020-06-09 | Stop reason: ALTCHOICE

## 2018-10-10 NOTE — PROGRESS NOTES
Post Operative Follow-up     This is a 59year old female who presents to the office for a three month follow-up s/p PLIF     Subjective: Nehemiah Fabian is a 59 y.o.  female who has a past medical history of chronic back pain, DM, thyroid dz, PVD, PE, MVP, depression, COPD, CKD, and bipolar disorder. Pt presents to the office today for a three month follow up s/p L3-L4, L4-L5, and L5-S1 fusion by Dr. Collin Haywood on 7/16/18. Pt states she is having left sided back pain along with right knee and shin pain. She describes the pain as achy and moderate in severity. Takes Tylenol with some relief. Pt ambulates with a walker. States it is hard to go from a sitting to standing position. Has an appointment with ortho for right knee. Had PT when she was at the facility, but has not been doing it since she has been home. Brace compliance. Denies numbness, tingling, N/V, or chest pain.     Physical Exam:              Obese, no apparent distress              Non-labored breathing               Vitals Stable              Alert and oriented x3              CN 3-12 intact              PERRL              EOMI              MEDELLIN well              5/5 BUE   4/5 BLE              Sensation to LT intact bilaterally   Incision site healed with no signs of infection                 Imaging: Lumbar x-rays reviewed and are stable.      Assessment: This is a 59 y.o.  female presenting for a 3 month follow-up s/p 3 level PLIF     Plan:  -Referral to pain clinic to evaluate patient and form pain regimen  -Physical therapy referral given to continue to strengthen core/back and legs  -Follow up with ortho for knee issues  -Follow-up in neurosurgery clinic in 9 months for 1 year follow up with repeat lumbar spine x-rays  -Call or return to neurosurgery office sooner if symptoms worsen or if new issues arise in the interim.     Electronically signed by Ryne Esparza PA-C on 10/10/2018 at 12:25 PM

## 2018-10-10 NOTE — LETTER
Post Operative Follow-up     This is a 59year old female who presents to the office for a three month follow-up s/p PLIF     Subjective: Jasson Nunez is a 59 y.o.  female who has a past medical history of chronic back pain, DM, thyroid dz, PVD, PE, MVP, depression, COPD, CKD, and bipolar disorder. Pt presents to the office today for a three month follow up s/p L3-L4, L4-L5, and L5-S1 fusion by Dr. Glenys Brannon on 7/16/18.      Pt states she is having left sided back pain along with right knee and shin pain. She describes the pain as achy and moderate in severity. Takes Tylenol with some relief. Pt ambulates with a walker. States it is hard to go from a sitting to standing position. Has an appointment with ortho for right knee. Had PT when she was at the facility, but has not been doing it since she has been home. Brace compliance. Denies numbness, tingling, N/V, or chest pain.     Physical Exam:              Obese, no apparent distress              Non-labored breathing               Vitals Stable              Alert and oriented x3              CN 3-12 intact              PERRL              EOMI              MEDELLIN well              5/5 BUE              4/5 BLE              Sensation to LT intact bilaterally              Incision site healed with no signs of infection                 Imaging: Lumbar x-rays reviewed and are stable.      Assessment:  This is a 59 y.o.  female presenting for a 3 month follow-up s/p 3 level PLIF     Plan:  -Referral to pain clinic to evaluate patient and form pain regimen  -Physical therapy referral given to continue to strengthen core/back and legs  -Follow up with ortho for knee issues  -Follow-up in neurosurgery clinic in 9 months for 1 year follow up with repeat lumbar spine x-rays  -Call or return to neurosurgery office sooner if symptoms worsen or if new issues arise in the interim.     Electronically signed by Coreen Pierre PA-C on 10/10/2018 at 12:25 PM

## 2018-10-15 ENCOUNTER — OFFICE VISIT (OUTPATIENT)
Dept: ORTHOPEDIC SURGERY | Age: 63
End: 2018-10-15
Payer: MEDICAID

## 2018-10-15 VITALS — TEMPERATURE: 98 F | HEIGHT: 66 IN | BODY MASS INDEX: 35.84 KG/M2 | WEIGHT: 223 LBS

## 2018-10-15 DIAGNOSIS — M17.0 PRIMARY OSTEOARTHRITIS OF BOTH KNEES: Primary | ICD-10-CM

## 2018-10-15 PROCEDURE — 99213 OFFICE O/P EST LOW 20 MIN: CPT | Performed by: ORTHOPAEDIC SURGERY

## 2018-10-15 PROCEDURE — 20610 DRAIN/INJ JOINT/BURSA W/O US: CPT | Performed by: ORTHOPAEDIC SURGERY

## 2018-10-15 RX ORDER — TRIAMCINOLONE ACETONIDE 40 MG/ML
40 INJECTION, SUSPENSION INTRA-ARTICULAR; INTRAMUSCULAR ONCE
Status: COMPLETED | OUTPATIENT
Start: 2018-10-15 | End: 2018-10-15

## 2018-10-15 RX ADMIN — TRIAMCINOLONE ACETONIDE 40 MG: 40 INJECTION, SUSPENSION INTRA-ARTICULAR; INTRAMUSCULAR at 12:29

## 2018-10-15 NOTE — PROGRESS NOTES
Chief Complaint   Patient presents with    Knee Pain     Bilateral Knee, F/U, R>L, finished Euflexxa series 1/30/18 with good results       Nba Weiss returns today for follow-up of her bilateral knee pain. she reports this is worse than when I saw her last.  The patient's pain level is a 8/10. She finished Euflexxa in January  much relief. The previous treatment was successful.     Past Medical History:   Diagnosis Date    Anxiety     Arrhythmia     Bipolar 2 disorder ([address]     Chronic back pain     CKD     Constipation     Continuous urine leakage     COPD (chronic obstructive pulmonary disease) (HCC)     Depression     Dysphagia     Environmental allergies     GERD (gastroesophageal reflux disease)     History of cardiovascular stress test 10-    LEXISCAN    History of cardiovascular stress test 06/2014    lexiscan stress    History of cardiovascular stress test 05/22/2018    Lexiscan stress test    Hyperlipidemia     MDRO (multiple drug resistant organisms) resistance     mrsa 3-4 yrs ago with back surgery    Menopausal symptoms     atrophic vaginitis    Mitral valve prolapse     Nausea & vomiting     with anesthesia    Neurogenic bladder     Obstructive sleep apnea     Overactive bladder     PONV (postoperative nausea and vomiting)     Pulmonary embolism ([facility])     few yrs ago has vena cava filter    PVD (peripheral vascular disease) ([facility])     Schizoaffective disorder (HCC)     Seizure disorder ([facility])     stares    Seizures ([address]     Thyroid disease     x30 years    Type 2 diabetes mellitus without complication ([address]     Type 2 diabetes mellitus, without long-term current use of insulin ([address] 7/16/2018     Past Surgical History:   Procedure Laterality Date    APPENDECTOMY      BACK SURGERY      BLADDER SURGERY      multiple   d/t  incontinence    BREAST SURGERY      bx neg lt    COLONOSCOPY      CYST REMOVAL  2006    vulvular sebaceous cyst excision    DIAGNOSTIC

## 2018-10-22 ENCOUNTER — TELEPHONE (OUTPATIENT)
Dept: NEUROSURGERY | Age: 63
End: 2018-10-22

## 2018-10-29 ENCOUNTER — TELEPHONE (OUTPATIENT)
Dept: ORTHOPEDIC SURGERY | Age: 63
End: 2018-10-29

## 2018-11-05 ENCOUNTER — OFFICE VISIT (OUTPATIENT)
Dept: NEUROLOGY | Age: 63
End: 2018-11-05
Payer: MEDICAID

## 2018-11-05 VITALS
WEIGHT: 222.8 LBS | DIASTOLIC BLOOD PRESSURE: 74 MMHG | OXYGEN SATURATION: 95 % | SYSTOLIC BLOOD PRESSURE: 111 MMHG | RESPIRATION RATE: 14 BRPM | TEMPERATURE: 97.1 F | HEIGHT: 66 IN | HEART RATE: 78 BPM | BODY MASS INDEX: 35.81 KG/M2

## 2018-11-05 DIAGNOSIS — R41.3 MEMORY DIFFICULTIES: ICD-10-CM

## 2018-11-05 DIAGNOSIS — M54.17 L-S RADICULOPATHY: Primary | ICD-10-CM

## 2018-11-05 PROCEDURE — 99214 OFFICE O/P EST MOD 30 MIN: CPT | Performed by: CLINICAL NURSE SPECIALIST

## 2018-11-05 NOTE — PROGRESS NOTES
polyvinyl alcohol (ARTIFICIAL TEARS) 1.4 % ophthalmic solution Place 1 drop into both eyes as needed  Yes Historical Provider, MD   fluPHENAZine HCl (PROLIXIN) 10 MG tablet Take 10 mg by mouth daily Every morning Yes Historical Provider, MD   nitroGLYCERIN (NITROSTAT) 0.4 MG SL tablet Place 1 tablet under the tongue every 5 minutes as needed for Chest pain  Patient taking differently: Place 0.4 mg under the tongue every 5 minutes as needed for Chest pain EVERY 5 MIN AS NEEDED TIMES 3 FOR CHEST PAIN Yes Nettie Mcgee MD   gabapentin (NEURONTIN) 100 MG capsule Take 100 mg by mouth 3 times daily  Yes Historical Provider, MD   benztropine (COGENTIN) 0.5 MG tablet Take 0.5 mg by mouth 3 times daily  Yes Historical Provider, MD   levothyroxine (SYNTHROID) 25 MCG tablet TAKE 1 TABLET BY MOUTH DAILY. Yes Cecily Lowery DO   topiramate (TOPAMAX) 100 MG tablet TAKE 1 TABLET BY MOUTH 2 TIMES DAILY. Patient taking differently: bid Yes Delano Garay DO   docusate sodium (DOCQLACE) 100 MG capsule TAKE 1 CAPSULE BY MOUTH DAILY. Patient taking differently: Take 100 mg by mouth daily TAKE 1 CAPSULE BY MOUTH DAILY.  Yes Kitty Dela Cruz,    ranitidine (ZANTAC) 150 MG tablet Take 300 mg by mouth nightly  Yes Historical Provider, MD     Allergies as of 11/05/2018 - Review Complete 11/05/2018   Allergen Reaction Noted    Penicillins Anaphylaxis 11/21/2003    Demerol Other (See Comments) 01/25/2011    Depakote [divalproex sodium]  04/19/2012    Haldol [haloperidol lactate] Other (See Comments) 01/25/2011    Sulfa antibiotics Nausea And Vomiting 11/21/2003        Objective:     /74 (Site: Right Upper Arm, Position: Sitting, Cuff Size: Large Adult)   Pulse 78   Temp 97.1 °F (36.2 °C) (Oral)   Resp 14   Ht 5' 6\" (1.676 m)   Wt 222 lb 12.8 oz (101.1 kg)   SpO2 95%   BMI 35.96 kg/m²    Afebrile    General appearance: alert, appears stated age, cooperative and morbidly obese  Head: Normocephalic, without obvious abnormality, atraumatic  Neck: no adenopathy, no carotid bruit and enlarged with limited ROM  Lungs: clear to auscultation bilaterally  Heart: regular rate and rhythm  Extremities: no cyanosis or edema  Pulses: 2+ and symmetric  Skin: no rashes or lesions     Mental Status: Alert, oriented, thought content appropriate    MMSE 30/30 for me again today     Speech: clear  Language: appropriate     Flat affect     Cranial Nerves:  I: smell    II: visual acuity     II: visual fields Full    II: pupils DORA   III,VII: ptosis None   III,IV,VI: extraocular muscles  EOMI without nystagmus    V: mastication Normal   V: facial light touch sensation  Normal   V,VII: corneal reflex  Present   VII: facial muscle function - upper     VII: facial muscle function - lower Normal   VIII: hearing Normal   IX: soft palate elevation  Normal   IX,X: gag reflex Present   XI: trapezius strength  5/5   XI: sternocleidomastoid strength 5/5   XI: neck extension strength  5/5   XII: tongue strength  Normal     Motor:  5/5 throughout  Obese bulk  Normal tone - no spasticity      Sensory:  LT normal  PP appreciated in all limbs   Vibration minimally decreased both ankles     Coordination:   FN, FFM and DARION normal  HS normal    Gait:  Wheeled walker -- steady and unassisted   Moderate Lydia's     DTR:   No reflexes    No Brian's     Laboratory/Radiology:     CBC with Differential:    Lab Results   Component Value Date    WBC 6.2 05/21/2018    RBC 3.95 05/21/2018    HGB 12.2 05/21/2018    HCT 35.9 05/21/2018     05/21/2018    MCV 90.9 05/21/2018    MCH 30.9 05/21/2018    MCHC 34.0 05/21/2018    RDW 13.6 05/21/2018    SEGSPCT 66 02/18/2013    LYMPHOPCT 24.0 05/21/2018    MONOPCT 8.3 05/21/2018    EOSPCT 5 10/15/2010    BASOPCT 0.2 05/21/2018    MONOSABS 0.52 05/21/2018    LYMPHSABS 1.50 05/21/2018    EOSABS 0.25 05/21/2018    BASOSABS 0.01 05/21/2018     CMP:    Lab Results   Component Value Date     07/17/2018    K 4.0 2018     2018    CO2 23 2018    BUN 10 2018    CREATININE 0.9 2018    GFRAA >60 2018    LABGLOM >60 2018    GLUCOSE 127 2018    GLUCOSE 102 2012    PROT 6.4 2018    LABALBU 3.9 2018    LABALBU 4.4 2012    CALCIUM 8.4 2018    BILITOT <0.2 2018    ALKPHOS 65 2018    AST 20 2018    ALT 16 2018     MRI Brain 2016:   No acute abnormality   Minimal  appreciated     EMG 2017:   Electrodiagnostic examination of the both legs disclosed evidence  diagnostic of the following: a left L5 motor radiculopathy. This was proven  again with the abnormal needle examination of the paraspinal muscles. Acute  denervation changes were also seen in the sacral paraspinal muscles, but again  these abnormalities may be related to lumbosacral laminectomy alone. MRI - L spine 2018   Personally reviewed at this time     I independently reviewed the studies at today's appointment.      Assessment:     Cognitive issues most likely stemming from her underlying psychiatric disease, however her EMILEE and noncompliance with Cpap is contributing   Polypharmacy most likely contributing as well     Her gait difficulties may be from her LS radiculopathy and her arthritic disease    Patient Active Problem List   Diagnosis    GERD (gastroesophageal reflux disease)    Hypothyroid    Schizophrenia ([address]    Bipolar 2 disorder ([address]    Hyperlipemia    Mitral prolapse    Ventricular ectopy    Status post total hip replacement, right     Plan:     Continue to follow with sleep medicine    Stop Flexeril    RTO spring Darrin Nageotte  11:50 AM  2018

## 2018-11-15 ENCOUNTER — HOSPITAL ENCOUNTER (OUTPATIENT)
Age: 63
Discharge: HOME | End: 2018-11-17
Payer: MEDICAID

## 2018-11-15 ENCOUNTER — OFFICE VISIT (OUTPATIENT)
Dept: PAIN MANAGEMENT | Age: 63
End: 2018-11-15
Payer: MEDICAID

## 2018-11-15 VITALS
WEIGHT: 228 LBS | BODY MASS INDEX: 36.64 KG/M2 | HEART RATE: 72 BPM | RESPIRATION RATE: 26 BRPM | DIASTOLIC BLOOD PRESSURE: 70 MMHG | HEIGHT: 66 IN | OXYGEN SATURATION: 94 % | TEMPERATURE: 98.2 F | SYSTOLIC BLOOD PRESSURE: 118 MMHG

## 2018-11-15 DIAGNOSIS — G89.29 CHRONIC PAIN OF RIGHT KNEE: ICD-10-CM

## 2018-11-15 DIAGNOSIS — M16.0 PRIMARY OSTEOARTHRITIS OF BOTH HIPS: ICD-10-CM

## 2018-11-15 DIAGNOSIS — M25.561 CHRONIC PAIN OF RIGHT KNEE: ICD-10-CM

## 2018-11-15 DIAGNOSIS — M47.816 LUMBAR SPONDYLOSIS: ICD-10-CM

## 2018-11-15 DIAGNOSIS — M25.562 CHRONIC PAIN OF LEFT KNEE: ICD-10-CM

## 2018-11-15 DIAGNOSIS — G89.29 CHRONIC PAIN OF LEFT KNEE: ICD-10-CM

## 2018-11-15 DIAGNOSIS — M47.816 LUMBAR FACET ARTHROPATHY: Primary | ICD-10-CM

## 2018-11-15 DIAGNOSIS — M54.16 LUMBAR RADICULOPATHY: ICD-10-CM

## 2018-11-15 LAB — SPECIFIC GRAVITY UA: <=1.005 (ref 1–1.03)

## 2018-11-15 PROCEDURE — 99204 OFFICE O/P NEW MOD 45 MIN: CPT | Performed by: PAIN MEDICINE

## 2018-11-15 PROCEDURE — G0480 DRUG TEST DEF 1-7 CLASSES: HCPCS

## 2018-11-15 PROCEDURE — 80307 DRUG TEST PRSMV CHEM ANLYZR: CPT

## 2018-11-15 RX ORDER — LOPERAMIDE HYDROCHLORIDE 2 MG/1
2 CAPSULE ORAL 4 TIMES DAILY PRN
COMMUNITY

## 2018-11-15 RX ORDER — ACETAMINOPHEN AND CODEINE PHOSPHATE 300; 30 MG/1; MG/1
1 TABLET ORAL EVERY 4 HOURS PRN
Qty: 30 TABLET | Refills: 0 | Status: SHIPPED | OUTPATIENT
Start: 2018-11-15 | End: 2018-12-13 | Stop reason: SDUPTHER

## 2018-11-15 RX ORDER — ACETAMINOPHEN AND CODEINE PHOSPHATE 300; 30 MG/1; MG/1
1 TABLET ORAL DAILY PRN
Qty: 30 TABLET | Refills: 0 | Status: CANCELLED | OUTPATIENT
Start: 2018-11-15 | End: 2018-12-15

## 2018-11-15 RX ORDER — CYCLOBENZAPRINE HCL 10 MG
10 TABLET ORAL 3 TIMES DAILY PRN
COMMUNITY
End: 2018-11-15

## 2018-11-15 RX ORDER — SENNOSIDES 8.6 MG
500 CAPSULE ORAL EVERY 8 HOURS PRN
COMMUNITY
End: 2019-10-21 | Stop reason: ALTCHOICE

## 2018-11-15 NOTE — PROGRESS NOTES
spinal nerve roots. Bilateral hip Xray 05/2018  Right hip arthroplasty noted without loosening or fracture. Left hip   appears normal. The bony pelvis is intact. Bilateral knee Xray 2017  AP, lateral and tangent views of the  left knee obtained.  Bony   alignment intact.  No marked joint space narrowing, fracture or joint   effusion. AP standing views right knee without focal abnormality. Previous treatments: Physical Therapy, Epidural Steroid Injection, Surgery L3-4,L4-5 and L5-S1 fusion, right hip replaced and medications. .      Past Medical History:   Diagnosis Date    Anxiety     Arrhythmia     Bipolar 2 disorder ([address]     Chronic back pain     CKD     Constipation     Continuous urine leakage     COPD (chronic obstructive pulmonary disease) (HCC)     Depression     Dysphagia     Environmental allergies     GERD (gastroesophageal reflux disease)     History of cardiovascular stress test 10-    LEXISCAN    History of cardiovascular stress test 06/2014    lexiscan stress    History of cardiovascular stress test 05/22/2018    Lexiscan stress test    Hyperlipidemia     MDRO (multiple drug resistant organisms) resistance     mrsa 3-4 yrs ago with back surgery    Menopausal symptoms     atrophic vaginitis    Mitral valve prolapse     Nausea & vomiting     with anesthesia    Neurogenic bladder     Obstructive sleep apnea     Overactive bladder     PONV (postoperative nausea and vomiting)     Pulmonary embolism (HCC)     few yrs ago has vena cava filter    [facility]) [facility]     Schizoaffective disorder (HCC)     Seizure disorder ([facility])     stares    Seizures ([address]     Thyroid disease     x30 years    Type 2 diabetes mellitus without complication ([address]     Type 2 diabetes mellitus, without long-term current use of insulin ([address] 7/16/2018     Past Surgical History:   Procedure Laterality Date    APPENDECTOMY      BACK SURGERY      BLADDER SURGERY 1.4 % ophthalmic solution Place 1 drop into both eyes as needed    Yes Historical Provider, MD   nitroGLYCERIN (NITROSTAT) 0.4 MG SL tablet Place 1 tablet under the tongue every 5 minutes as needed for Chest pain  Patient taking differently: Place 0.4 mg under the tongue every 5 minutes as needed for Chest pain EVERY 5 MIN AS NEEDED TIMES 3 FOR CHEST PAIN 8/9/16  Yes Mary Leavitt MD   gabapentin (NEURONTIN) 100 MG capsule Take 100 mg by mouth 3 times daily    Yes Historical Provider, MD   benztropine (COGENTIN) 0.5 MG tablet Take 0.5 mg by mouth 3 times daily    Yes Historical Provider, MD   levothyroxine (SYNTHROID) 25 MCG tablet TAKE 1 TABLET BY MOUTH DAILY. 7/30/15  Yes Cecily Lowery,    topiramate (TOPAMAX) 100 MG tablet TAKE 1 TABLET BY MOUTH 2 TIMES DAILY. Patient taking differently: bid 6/23/15  Yes Delano Garay, DO   docusate sodium (DOCQLACE) 100 MG capsule TAKE 1 CAPSULE BY MOUTH DAILY. Patient taking differently: Take 100 mg by mouth daily TAKE 1 CAPSULE BY MOUTH DAILY. 5/22/15  Yes Henry Weaver, DO   ranitidine (ZANTAC) 150 MG tablet Take 300 mg by mouth nightly    Yes Historical Provider, MD   gabapentin (NEURONTIN) 100 MG capsule Take 1 capsule by mouth 3 times daily for 7 days. . 7/19/18 11/5/18  ANSELMO Parikh     Allergies   Allergen Reactions    Penicillins Anaphylaxis    Demerol Other (See Comments)     hypotension    Depakote [Divalproex Sodium]      ?  Haldol [Haloperidol Lactate] Other (See Comments)     shakes    Sulfa Antibiotics Nausea And Vomiting       Social History     Social History    Marital status:      Spouse name: N/A    Number of children: N/A    Years of education: N/A     Occupational History    Not on file.      Social History Main Topics    Smoking status: Former Smoker     Packs/day: 0.00     Years: 25.00     Types: Cigarettes     Quit date: 1/1/2002    Smokeless tobacco: Never Used    Alcohol use No      Comment: quit 2002. 4

## 2018-11-16 ENCOUNTER — TELEPHONE (OUTPATIENT)
Dept: NEUROSURGERY | Age: 63
End: 2018-11-16

## 2018-11-16 ENCOUNTER — TELEPHONE (OUTPATIENT)
Dept: PAIN MANAGEMENT | Age: 63
End: 2018-11-16

## 2018-11-19 LAB
7-AMINOCLONAZEPAM, URINE: <5 NG/ML
ALPHA-HYDROXYALPRAZOLAM, URINE: <5 NG/ML
ALPHA-HYDROXYMIDAZOLAM, URINE: <20 NG/ML
ALPRAZOLAM, URINE: <5 NG/ML
CHLORDIAZEPOXIDE, URINE: <20 NG/ML
CLONAZEPAM, URINE: <5 NG/ML
DIAZEPAM, URINE: <20 NG/ML
LORAZEPAM, URINE: 401 NG/ML
Lab: NORMAL
MIDAZOLAM, URINE: <20 NG/ML
NORDIAZEPAM, URINE: <20 NG/ML
OXAZEPAM, URINE: <20 NG/ML
REPORT: NORMAL
TEMAZEPAM, URINE: <20 NG/ML
THIS TEST SENT TO: NORMAL

## 2018-11-19 NOTE — TELEPHONE ENCOUNTER
Patient states she has stinging along her spine. It has been going on for awhile. Bending makes it worse.

## 2018-11-21 LAB
6AM URINE: <10 NG/ML
CODEINE, URINE: <20 NG/ML
HYDROCODONE, URINE: <20 NG/ML
HYDROMORPHONE, URINE: <20 NG/ML
MORPHINE URINE: <20 NG/ML
NORHYDROCODONE, URINE: <20 NG/ML
NOROXYCODONE, URINE: <20 NG/ML
NOROXYMORPHONE, URINE: <20 NG/ML
OXYCODONE, URINE CONFIRMATION: <20 NG/ML
OXYMORPHONE, URINE: <20 NG/ML

## 2018-11-29 ENCOUNTER — TELEPHONE (OUTPATIENT)
Dept: NEUROSURGERY | Age: 63
End: 2018-11-29

## 2018-11-29 DIAGNOSIS — M51.36 LUMBAR DEGENERATIVE DISC DISEASE: Primary | ICD-10-CM

## 2018-12-03 ENCOUNTER — OFFICE VISIT (OUTPATIENT)
Dept: CARDIOLOGY CLINIC | Age: 63
End: 2018-12-03
Payer: MEDICAID

## 2018-12-03 VITALS
WEIGHT: 220 LBS | BODY MASS INDEX: 35.36 KG/M2 | SYSTOLIC BLOOD PRESSURE: 112 MMHG | HEIGHT: 66 IN | DIASTOLIC BLOOD PRESSURE: 70 MMHG | HEART RATE: 63 BPM

## 2018-12-03 DIAGNOSIS — R07.9 CHEST PAIN, UNSPECIFIED TYPE: Primary | ICD-10-CM

## 2018-12-03 PROCEDURE — 93000 ELECTROCARDIOGRAM COMPLETE: CPT | Performed by: INTERNAL MEDICINE

## 2018-12-03 PROCEDURE — 99213 OFFICE O/P EST LOW 20 MIN: CPT | Performed by: NURSE PRACTITIONER

## 2018-12-03 RX ORDER — NITROGLYCERIN 0.4 MG/1
0.4 TABLET SUBLINGUAL EVERY 5 MIN PRN
Qty: 25 TABLET | Refills: 3 | Status: CANCELLED | OUTPATIENT
Start: 2018-12-03

## 2018-12-03 RX ORDER — NITROGLYCERIN 0.4 MG/1
0.4 TABLET SUBLINGUAL EVERY 5 MIN PRN
Qty: 25 TABLET | Refills: 3 | Status: SHIPPED | OUTPATIENT
Start: 2018-12-03 | End: 2018-12-05 | Stop reason: SDUPTHER

## 2018-12-03 RX ORDER — NITROGLYCERIN 0.4 MG/1
0.4 TABLET SUBLINGUAL EVERY 5 MIN PRN
Qty: 25 TABLET | Refills: 0 | Status: SHIPPED | OUTPATIENT
Start: 2018-12-03 | End: 2019-12-02 | Stop reason: SDUPTHER

## 2018-12-03 NOTE — PROGRESS NOTES
[facility] PHYSICIAN CARDIOLOGY   OFFICE VISIT        PRIMARY CARE PHYSICIAN:      BRETT MONACO         ALLERGIES / SENSITIVITIES:        Allergies   Allergen Reactions    Penicillins Anaphylaxis    Demerol Other (See Comments)     hypotension    Depakote [Divalproex Sodium]      ?  Haldol [Haloperidol Lactate] Other (See Comments)     shakes    Sulfa Antibiotics Nausea And Vomiting          REVIEWED MEDICATIONS:       Current Outpatient Prescriptions   Medication Sig Dispense Refill    loperamide (IMODIUM) 2 MG capsule Take 2 mg by mouth 4 times daily as needed for Diarrhea      acetaminophen (TYLENOL 8 HOUR ARTHRITIS PAIN) 650 MG extended release tablet Take 650 mg by mouth every 8 hours as needed for Pain      acetaminophen-codeine (TYLENOL/CODEINE #3) 300-30 MG per tablet Take 1 tablet by mouth every 4 hours as needed for Pain for up to 30 days. Intended supply: 3 days. Take lowest dose possible to manage pain.  30 tablet 0    aspirin 81 MG tablet Take 81 mg by mouth daily      pantoprazole sodium (PROTONIX) 40 MG PACK packet Take 40 mg by mouth every morning (before breakfast)      Calcium & Magnesium Carbonates (MYLANTA PO) Take 20 mLs by mouth every 4 hours as needed GI DISTRESS      Dextromethorphan-Guaifenesin (ROBITUSSIN DM PO) Take 10 mLs by mouth every 4 hours as needed      magnesium hydroxide (MILK OF MAGNESIA) 400 MG/5ML suspension Take 30 mLs by mouth daily as needed for Constipation IF NO BM 3 DAYS      Bismuth Subsalicylate (PEPTO-BISMOL PO) Take 30 mLs by mouth EVERY HOUR PRN DIARRHEA/UPSET STOMACH MAX 8 DOSES IN 24 HOURS      glucose (GLUTOSE) 40 % GEL Take 15 g by mouth See Admin Instructions IF BLOOD SUGAR <60      calcium carbonate (TUMS) 500 MG chewable tablet Take 2 tablets by mouth 4 times daily as needed for Heartburn      Zinc Gluconate (COLD-EEZE PO) Take 1 lozenge by mouth every 2 hours as needed TIMES 1 DAY PRN SORE THROAT      Fexofenadine-Pseudoephedrine (ALLEGRA-D 12 HOUR PO) Take 1 tablet by mouth 2 times daily as needed RUNNY NOSE OR CONGESTION      Dextromethorphan-Guaifenesin (MUCINEX DM PO) Take 1 tablet by mouth 2 times daily as needed COUGH      fluPHENAZine HCl (PROLIXIN) 10 MG tablet Take 25 mg by mouth nightly GIVE 2.5 TABLETS OF THE 10 MG TO EQUAL 25 MG      Crisaborole 2 % OINT Apply 1 drop topically daily 1 Tube 1    Cholecalciferol (VITAMIN D3) 42446 units CAPS Take by mouth once a week SATURDAY      ARIPiprazole (ABILIFY) 30 MG tablet Take 30 mg by mouth daily      Prenatal Vit-Fe Fumarate-FA (VIRT BALA) 28-1 MG TABS Take 1 tablet by mouth daily      metFORMIN (GLUCOPHAGE) 500 MG tablet Take 500 mg by mouth daily (with breakfast)      polyethylene glycol (GLYCOLAX) packet Take 17 g by mouth daily as needed for Constipation IN 8 OZ WATER/JUICE/SODA/COFFEE/TEA AND DRINK BY MOUTH DAILY AS NEEDED FOR CONSTIPATION      albuterol sulfate  (90 Base) MCG/ACT inhaler Inhale 2 puffs into the lungs every 6 hours as needed for Wheezing or Shortness of Breath UP TO 4 TIMES A DAY      sucralfate (CARAFATE) 1 GM tablet Take 1 g by mouth 4 times daily      albuterol (PROVENTIL) (2.5 MG/3ML) 0.083% nebulizer solution Take 2.5 mg by nebulization 2 times daily UP TO 4 TIMES PER DAY AS NEEDED FOR COUGH, WHEEZING OR SHORTNESS OF BREATH      polycarbophil (FIBERCON) 625 MG tablet Take 625 mg by mouth daily WITH GLASS OF WATER      polyethyl glycol-propyl glycol 0.4-0.3 % (SYSTANE) 0.4-0.3 % ophthalmic solution Place 1 drop into both eyes 4 times daily       Sodium Phosphates (FLEET) 7-19 GM/118ML Place 1 enema rectally once as needed If no bowel movement in 2 days ( IF NO DULCOLAX SUPP RESULTS)      LORazepam (ATIVAN) 0.5 MG tablet Take 0.5 mg by mouth 2 times daily. PRN.       melatonin 3 MG TABS tablet Take 3 mg by mouth nightly       oxybutynin (DITROPAN) 5 MG tablet Take 5 mg by mouth 3 times daily      venlafaxine (EFFEXOR XR) 150 MG extended

## 2018-12-05 ENCOUNTER — OFFICE VISIT (OUTPATIENT)
Dept: NEUROSURGERY | Age: 63
End: 2018-12-05
Payer: MEDICAID

## 2018-12-05 VITALS
SYSTOLIC BLOOD PRESSURE: 122 MMHG | HEIGHT: 66 IN | WEIGHT: 222 LBS | HEART RATE: 83 BPM | BODY MASS INDEX: 35.68 KG/M2 | DIASTOLIC BLOOD PRESSURE: 74 MMHG

## 2018-12-05 DIAGNOSIS — M43.16 SPONDYLOLISTHESIS AT L3-L4 LEVEL: ICD-10-CM

## 2018-12-05 DIAGNOSIS — M51.36 LUMBAR DEGENERATIVE DISC DISEASE: Primary | ICD-10-CM

## 2018-12-05 DIAGNOSIS — M79.604 PAIN OF RIGHT LOWER EXTREMITY: ICD-10-CM

## 2018-12-05 PROCEDURE — 99213 OFFICE O/P EST LOW 20 MIN: CPT | Performed by: PHYSICIAN ASSISTANT

## 2018-12-05 RX ORDER — ERGOCALCIFEROL 1.25 MG/1
50000 CAPSULE ORAL WEEKLY
COMMUNITY

## 2018-12-05 RX ORDER — OXYCODONE AND ACETAMINOPHEN 10; 325 MG/1; MG/1
1 TABLET ORAL EVERY 4 HOURS PRN
COMMUNITY
End: 2018-12-13

## 2018-12-05 RX ORDER — NITROGLYCERIN 0.4 MG/1
0.4 TABLET SUBLINGUAL EVERY 5 MIN PRN
Qty: 25 TABLET | Refills: 5 | Status: SHIPPED | OUTPATIENT
Start: 2018-12-05 | End: 2019-03-15 | Stop reason: ALTCHOICE

## 2018-12-09 DIAGNOSIS — M51.36 LUMBAR DEGENERATIVE DISC DISEASE: ICD-10-CM

## 2018-12-10 NOTE — PROGRESS NOTES
Bilateral hip Xray 2018  Right hip arthroplasty noted without loosening or fracture. Left hip   appears normal. The bony pelvis is intact.      Bilateral knee Xray   AP, lateral and tangent views of the  left knee obtained.  Bony   alignment intact.  No marked joint space narrowing, fracture or joint   effusion.  AP standing views right knee without focal abnormality.             Potential Aberrant Drug-Related Behavior:  None    Urine Drug Screenin/15/2018 consistent for lorazepam    OARRS report:  2018 consistent    Past Medical History:   Diagnosis Date    Anxiety     Arrhythmia     Bipolar 2 disorder (HCC)     Chronic back pain     CKD     Constipation     Continuous urine leakage     COPD (chronic obstructive pulmonary disease) (HCC)     Depression     Dysphagia     Environmental allergies     GERD (gastroesophageal reflux disease)     History of cardiovascular stress test 10-    LEXISCAN    History of cardiovascular stress test 2014    lexiscan stress    History of cardiovascular stress test 2018    Lexiscan stress test    Hyperlipidemia     MDRO (multiple drug resistant organisms) resistance     mrsa 3-4 yrs ago with back surgery    Menopausal symptoms     atrophic vaginitis    Mitral valve prolapse     Nausea & vomiting     with anesthesia    Neurogenic bladder     Obstructive sleep apnea     Overactive bladder     PONV (postoperative nausea and vomiting)     Pulmonary embolism (HCC)     few yrs ago has vena cava filter    PVD (peripheral vascular disease) (HCC)     Schizoaffective disorder (HCC)     Seizure disorder (HCC)     stares    Seizures ([address]     Thyroid disease     x30 years    Type 2 diabetes mellitus without complication ([address]     Type 2 diabetes mellitus, without long-term current use of insulin ([address] 2018       Past Surgical History:   Procedure Laterality Date    APPENDECTOMY      BACK SURGERY      BLADDER SURGERY

## 2018-12-13 ENCOUNTER — OFFICE VISIT (OUTPATIENT)
Dept: PAIN MANAGEMENT | Age: 63
End: 2018-12-13
Payer: MEDICAID

## 2018-12-13 VITALS
OXYGEN SATURATION: 98 % | SYSTOLIC BLOOD PRESSURE: 124 MMHG | HEIGHT: 66 IN | RESPIRATION RATE: 18 BRPM | TEMPERATURE: 98 F | HEART RATE: 84 BPM | WEIGHT: 222 LBS | DIASTOLIC BLOOD PRESSURE: 78 MMHG | BODY MASS INDEX: 35.68 KG/M2

## 2018-12-13 DIAGNOSIS — Z96.641 STATUS POST TOTAL HIP REPLACEMENT, RIGHT: ICD-10-CM

## 2018-12-13 DIAGNOSIS — M47.816 LUMBAR FACET ARTHROPATHY: ICD-10-CM

## 2018-12-13 DIAGNOSIS — M16.11 PRIMARY OSTEOARTHRITIS OF RIGHT HIP: ICD-10-CM

## 2018-12-13 DIAGNOSIS — M16.0 PRIMARY OSTEOARTHRITIS OF BOTH HIPS: ICD-10-CM

## 2018-12-13 DIAGNOSIS — M47.816 LUMBAR SPONDYLOSIS: Primary | ICD-10-CM

## 2018-12-13 DIAGNOSIS — G89.4 CHRONIC PAIN SYNDROME: ICD-10-CM

## 2018-12-13 DIAGNOSIS — M17.12 PRIMARY OSTEOARTHRITIS OF LEFT KNEE: ICD-10-CM

## 2018-12-13 DIAGNOSIS — G89.29 CHRONIC PAIN OF RIGHT KNEE: ICD-10-CM

## 2018-12-13 DIAGNOSIS — M54.16 LUMBAR RADICULOPATHY: ICD-10-CM

## 2018-12-13 DIAGNOSIS — M25.561 CHRONIC PAIN OF RIGHT KNEE: ICD-10-CM

## 2018-12-13 DIAGNOSIS — M43.16 SPONDYLOLISTHESIS AT L3-L4 LEVEL: ICD-10-CM

## 2018-12-13 DIAGNOSIS — M25.562 CHRONIC PAIN OF LEFT KNEE: ICD-10-CM

## 2018-12-13 DIAGNOSIS — G89.29 CHRONIC PAIN OF LEFT KNEE: ICD-10-CM

## 2018-12-13 PROCEDURE — 99213 OFFICE O/P EST LOW 20 MIN: CPT | Performed by: PHYSICIAN ASSISTANT

## 2018-12-13 RX ORDER — ACETAMINOPHEN AND CODEINE PHOSPHATE 300; 30 MG/1; MG/1
1 TABLET ORAL DAILY PRN
Qty: 30 TABLET | Refills: 0 | Status: SHIPPED | OUTPATIENT
Start: 2018-12-15 | End: 2019-01-14 | Stop reason: SDUPTHER

## 2018-12-31 ENCOUNTER — EVALUATION (OUTPATIENT)
Dept: PHYSICAL THERAPY | Age: 63
End: 2018-12-31
Payer: MEDICAID

## 2018-12-31 DIAGNOSIS — Z96.641 STATUS POST TOTAL HIP REPLACEMENT, RIGHT: ICD-10-CM

## 2018-12-31 DIAGNOSIS — M16.0 PRIMARY OSTEOARTHRITIS OF BOTH HIPS: ICD-10-CM

## 2018-12-31 DIAGNOSIS — M25.561 CHRONIC PAIN OF RIGHT KNEE: ICD-10-CM

## 2018-12-31 DIAGNOSIS — G89.4 CHRONIC PAIN SYNDROME: ICD-10-CM

## 2018-12-31 DIAGNOSIS — G89.29 CHRONIC PAIN OF LEFT KNEE: ICD-10-CM

## 2018-12-31 DIAGNOSIS — M47.816 LUMBAR FACET ARTHROPATHY: ICD-10-CM

## 2018-12-31 DIAGNOSIS — M16.11 PRIMARY OSTEOARTHRITIS OF RIGHT HIP: ICD-10-CM

## 2018-12-31 DIAGNOSIS — M43.16 SPONDYLOLISTHESIS AT L3-L4 LEVEL: ICD-10-CM

## 2018-12-31 DIAGNOSIS — M54.16 LUMBAR RADICULOPATHY: ICD-10-CM

## 2018-12-31 DIAGNOSIS — G89.29 CHRONIC PAIN OF RIGHT KNEE: ICD-10-CM

## 2018-12-31 DIAGNOSIS — M47.816 LUMBAR SPONDYLOSIS: Primary | ICD-10-CM

## 2018-12-31 DIAGNOSIS — M25.562 CHRONIC PAIN OF LEFT KNEE: ICD-10-CM

## 2018-12-31 DIAGNOSIS — M17.12 PRIMARY OSTEOARTHRITIS OF LEFT KNEE: ICD-10-CM

## 2018-12-31 PROCEDURE — G8979 MOBILITY GOAL STATUS: HCPCS | Performed by: PHYSICAL THERAPIST

## 2018-12-31 PROCEDURE — 97162 PT EVAL MOD COMPLEX 30 MIN: CPT | Performed by: PHYSICAL THERAPIST

## 2018-12-31 PROCEDURE — G8978 MOBILITY CURRENT STATUS: HCPCS | Performed by: PHYSICAL THERAPIST

## 2018-12-31 NOTE — PROGRESS NOTES
[facility] OUTPATIENT REHABILITATION  PHYSICAL THERAPY INITIAL EVALUATION         Date:  2018   Patient: Javed Dale  : 1955  MRN: [id number]  Referring Provider: Sukumar Lubin, [address] [place] [address]     Medical Diagnosis:      Diagnosis Orders   1. Lumbar spondylosis     2. Chronic pain syndrome     3. Lumbar radiculopathy     4. Lumbar facet arthropathy     5. Chronic pain of left knee     6. Chronic pain of right knee     7. Primary osteoarthritis of both hips     8. Spondylolisthesis at L3-L4 level     9. Primary osteoarthritis of right hip     10. Primary osteoarthritis of left knee     11. Status post total hip replacement, right       History: Patient has history of chronic back and B LE pain. Patient has history L3-L4, L4-L5, and L5-S1 fusion by Dr. Neha Bryan on 18. Continues to wear back brace - reports she called Dr. Maggie Naidu office about it and they told her she could continue it for comfort. Also has history of multiple R hip and femur surgeries, R ALLISON, and revision R ALLISON. Reports prior PT, but cannot remember how long ago and whether or not it helped. She states it was a combination of chiropractic, PT, and massage. Patient uses Rollator for ambulation; does not know how long she has been using it. Chief complaint: Stinging pain low back and both legs (R>L), R leg pain to ankle, pain medial and anterior R knee, weakness both legs.      SUBJECTIVE:     Past Medical History  Past Medical History:   Diagnosis Date    Anxiety     Arrhythmia     Bipolar 2 disorder ([address]     Chronic back pain     CKD     Constipation     Continuous urine leakage     COPD (chronic obstructive pulmonary disease) (HCC)     Depression     Dysphagia     Environmental allergies     GERD (gastroesophageal reflux disease)     History of cardiovascular stress test 10-    LEXISCAN    History of cardiovascular stress test 06/2014    lexiscan stress    History of cardiovascular stress test 05/22/2018    Lexiscan stress test    Hyperlipidemia     MDRO (multiple drug resistant organisms) resistance     mrsa 3-4 yrs ago with back surgery    Menopausal symptoms     atrophic vaginitis    Mitral valve prolapse     Nausea & vomiting     with anesthesia    Neurogenic bladder     Obstructive sleep apnea     Overactive bladder     PONV (postoperative nausea and vomiting)     Pulmonary embolism (HCC)     few yrs ago has vena cava filter    PVD (peripheral vascular disease) ([address]     Schizoaffective disorder ([address]     Seizure disorder (HCC)     stares    Seizures (HCC)     Thyroid disease     x30 years    Type 2 diabetes mellitus without complication ([address]     Type 2 diabetes mellitus, without long-term current use of insulin ([address] 7/16/2018     Past Surgical History:   Procedure Laterality Date    APPENDECTOMY      BACK SURGERY      BLADDER SURGERY      multiple   d/t  incontinence    BREAST SURGERY      bx neg lt    COLONOSCOPY      CYST REMOVAL  2006    vulvular sebaceous cyst excision    DIAGNOSTIC CARDIAC CATH LAB PROCEDURE  1996/1997    showed \"mitral valve prolapse\", records not available     ECHO COMPL W DOP COLOR FLOW  10/10/2012         ENDOSCOPY, COLON, DIAGNOSTIC      HAMMER TOE SURGERY      lt    HIP SURGERY       screws and rods     HYSTERECTOMY      JOINT REPLACEMENT      rt hip & revision    KNEE ARTHROSCOPY  10-26-12    left    OTHER SURGICAL HISTORY      supra pubic tubing for 1 1/2 yrs then removed    OTHER SURGICAL HISTORY Bilateral JULY 11, 2013    BILAT MAXILLARY ANTROSTOMY,BILATERAL  INFERIOR TURBINATE CAUTERY AND LATERALIZATION,ENDOSCOPIC EXCISION BILATERAL MIDDLE TURBINATE ELVIS BULLOSA     OTHER SURGICAL HISTORY Left 7-22-15    left knee arthroscopy partial lateral menisectomy chrondroplasty partial synovectomy     MA ARTHDSIS POST/POSTERLATRL/POSTINTRBDYADL SPC/SEG N/A 7/16/2018 tablet Take 2 tablets by mouth 4 times daily as needed for Heartburn      Zinc Gluconate (COLD-EEZE PO) Take 1 lozenge by mouth every 2 hours as needed TIMES 1 DAY PRN SORE THROAT      Fexofenadine-Pseudoephedrine (ALLEGRA-D 12 HOUR PO) Take 1 tablet by mouth 2 times daily as needed RUNNY NOSE OR CONGESTION      Dextromethorphan-Guaifenesin (MUCINEX DM PO) Take 1 tablet by mouth 2 times daily as needed COUGH      fluPHENAZine HCl (PROLIXIN) 10 MG tablet Take 25 mg by mouth nightly GIVE 2.5 TABLETS OF THE 10 MG TO EQUAL 25 MG      Crisaborole 2 % OINT Apply 1 drop topically daily 1 Tube 1    Cholecalciferol (VITAMIN D3) 57916 units CAPS Take by mouth once a week SATURDAY      ARIPiprazole (ABILIFY) 30 MG tablet Take 30 mg by mouth daily      Prenatal Vit-Fe Fumarate-FA (VIRT BALA) 28-1 MG TABS Take 1 tablet by mouth daily      metFORMIN (GLUCOPHAGE) 500 MG tablet Take 500 mg by mouth daily (with breakfast)      polyethylene glycol (GLYCOLAX) packet Take 17 g by mouth daily as needed for Constipation IN 8 OZ WATER/JUICE/SODA/COFFEE/TEA AND DRINK BY MOUTH DAILY AS NEEDED FOR CONSTIPATION      albuterol sulfate  (90 Base) MCG/ACT inhaler Inhale 2 puffs into the lungs every 6 hours as needed for Wheezing or Shortness of Breath UP TO 4 TIMES A DAY      sucralfate (CARAFATE) 1 GM tablet Take 1 g by mouth 4 times daily      albuterol (PROVENTIL) (2.5 MG/3ML) 0.083% nebulizer solution Take 2.5 mg by nebulization 2 times daily UP TO 4 TIMES PER DAY AS NEEDED FOR COUGH, WHEEZING OR SHORTNESS OF BREATH      polycarbophil (FIBERCON) 625 MG tablet Take 625 mg by mouth daily WITH GLASS OF WATER      polyethyl glycol-propyl glycol 0.4-0.3 % (SYSTANE) 0.4-0.3 % ophthalmic solution Place 1 drop into both eyes 4 times daily       Sodium Phosphates (FLEET) 7-19 GM/118ML Place 1 enema rectally once as needed If no bowel movement in 2 days ( IF NO DULCOLAX SUPP RESULTS)      LORazepam (ATIVAN) 0.5 MG tablet Take

## 2019-01-02 ENCOUNTER — TELEPHONE (OUTPATIENT)
Dept: PAIN MANAGEMENT | Age: 64
End: 2019-01-02

## 2019-01-04 ENCOUNTER — TREATMENT (OUTPATIENT)
Dept: PHYSICAL THERAPY | Age: 64
End: 2019-01-04
Payer: MEDICAID

## 2019-01-04 DIAGNOSIS — M54.16 LUMBAR RADICULOPATHY: ICD-10-CM

## 2019-01-04 DIAGNOSIS — M47.816 LUMBAR FACET ARTHROPATHY: ICD-10-CM

## 2019-01-04 DIAGNOSIS — M25.562 CHRONIC PAIN OF LEFT KNEE: ICD-10-CM

## 2019-01-04 DIAGNOSIS — G89.29 CHRONIC PAIN OF LEFT KNEE: ICD-10-CM

## 2019-01-04 DIAGNOSIS — M17.12 PRIMARY OSTEOARTHRITIS OF LEFT KNEE: ICD-10-CM

## 2019-01-04 DIAGNOSIS — M47.816 LUMBAR SPONDYLOSIS: Primary | ICD-10-CM

## 2019-01-04 DIAGNOSIS — M16.11 PRIMARY OSTEOARTHRITIS OF RIGHT HIP: ICD-10-CM

## 2019-01-04 DIAGNOSIS — M43.16 SPONDYLOLISTHESIS AT L3-L4 LEVEL: ICD-10-CM

## 2019-01-04 DIAGNOSIS — G89.4 CHRONIC PAIN SYNDROME: ICD-10-CM

## 2019-01-04 DIAGNOSIS — M16.0 PRIMARY OSTEOARTHRITIS OF BOTH HIPS: ICD-10-CM

## 2019-01-04 DIAGNOSIS — G89.29 CHRONIC PAIN OF RIGHT KNEE: ICD-10-CM

## 2019-01-04 DIAGNOSIS — M25.561 CHRONIC PAIN OF RIGHT KNEE: ICD-10-CM

## 2019-01-04 DIAGNOSIS — Z96.641 STATUS POST TOTAL HIP REPLACEMENT, RIGHT: ICD-10-CM

## 2019-01-04 PROCEDURE — 97110 THERAPEUTIC EXERCISES: CPT

## 2019-01-07 ENCOUNTER — TREATMENT (OUTPATIENT)
Dept: PHYSICAL THERAPY | Age: 64
End: 2019-01-07
Payer: MEDICAID

## 2019-01-07 DIAGNOSIS — M54.16 LUMBAR RADICULOPATHY: ICD-10-CM

## 2019-01-07 DIAGNOSIS — M25.562 CHRONIC PAIN OF LEFT KNEE: ICD-10-CM

## 2019-01-07 DIAGNOSIS — M16.11 PRIMARY OSTEOARTHRITIS OF RIGHT HIP: ICD-10-CM

## 2019-01-07 DIAGNOSIS — G89.29 CHRONIC PAIN OF RIGHT KNEE: ICD-10-CM

## 2019-01-07 DIAGNOSIS — M43.16 SPONDYLOLISTHESIS AT L3-L4 LEVEL: ICD-10-CM

## 2019-01-07 DIAGNOSIS — M47.816 LUMBAR SPONDYLOSIS: Primary | ICD-10-CM

## 2019-01-07 DIAGNOSIS — M25.561 CHRONIC PAIN OF RIGHT KNEE: ICD-10-CM

## 2019-01-07 DIAGNOSIS — G89.29 CHRONIC PAIN OF LEFT KNEE: ICD-10-CM

## 2019-01-07 DIAGNOSIS — G89.4 CHRONIC PAIN SYNDROME: ICD-10-CM

## 2019-01-07 DIAGNOSIS — M16.0 PRIMARY OSTEOARTHRITIS OF BOTH HIPS: ICD-10-CM

## 2019-01-07 DIAGNOSIS — M47.816 LUMBAR FACET ARTHROPATHY: ICD-10-CM

## 2019-01-07 DIAGNOSIS — Z96.641 STATUS POST TOTAL HIP REPLACEMENT, RIGHT: ICD-10-CM

## 2019-01-07 DIAGNOSIS — M17.12 PRIMARY OSTEOARTHRITIS OF LEFT KNEE: ICD-10-CM

## 2019-01-07 PROCEDURE — 97110 THERAPEUTIC EXERCISES: CPT

## 2019-01-08 ENCOUNTER — PROCEDURE VISIT (OUTPATIENT)
Dept: AUDIOLOGY | Age: 64
End: 2019-01-08
Payer: MEDICAID

## 2019-01-08 ENCOUNTER — OFFICE VISIT (OUTPATIENT)
Dept: ENT CLINIC | Age: 64
End: 2019-01-08
Payer: MEDICAID

## 2019-01-08 VITALS
DIASTOLIC BLOOD PRESSURE: 69 MMHG | HEIGHT: 66 IN | SYSTOLIC BLOOD PRESSURE: 127 MMHG | HEART RATE: 59 BPM | BODY MASS INDEX: 35.84 KG/M2 | WEIGHT: 223 LBS

## 2019-01-08 DIAGNOSIS — H90.3 SENSORINEURAL HEARING LOSS (SNHL) OF BOTH EARS: Primary | ICD-10-CM

## 2019-01-08 DIAGNOSIS — H90.3 SENSORINEURAL HEARING LOSS, BILATERAL: Primary | ICD-10-CM

## 2019-01-08 PROCEDURE — 99213 OFFICE O/P EST LOW 20 MIN: CPT | Performed by: OTOLARYNGOLOGY

## 2019-01-08 PROCEDURE — 92557 COMPREHENSIVE HEARING TEST: CPT | Performed by: AUDIOLOGIST

## 2019-01-08 PROCEDURE — 92567 TYMPANOMETRY: CPT | Performed by: AUDIOLOGIST

## 2019-01-10 ENCOUNTER — TREATMENT (OUTPATIENT)
Dept: PHYSICAL THERAPY | Age: 64
End: 2019-01-10
Payer: MEDICAID

## 2019-01-10 DIAGNOSIS — Z96.641 STATUS POST TOTAL HIP REPLACEMENT, RIGHT: ICD-10-CM

## 2019-01-10 DIAGNOSIS — M43.16 SPONDYLOLISTHESIS AT L3-L4 LEVEL: ICD-10-CM

## 2019-01-10 DIAGNOSIS — M25.562 CHRONIC PAIN OF LEFT KNEE: ICD-10-CM

## 2019-01-10 DIAGNOSIS — M17.12 PRIMARY OSTEOARTHRITIS OF LEFT KNEE: ICD-10-CM

## 2019-01-10 DIAGNOSIS — M47.816 LUMBAR SPONDYLOSIS: Primary | ICD-10-CM

## 2019-01-10 DIAGNOSIS — G89.4 CHRONIC PAIN SYNDROME: ICD-10-CM

## 2019-01-10 DIAGNOSIS — G89.29 CHRONIC PAIN OF RIGHT KNEE: ICD-10-CM

## 2019-01-10 DIAGNOSIS — M25.561 CHRONIC PAIN OF RIGHT KNEE: ICD-10-CM

## 2019-01-10 DIAGNOSIS — G89.29 CHRONIC PAIN OF LEFT KNEE: ICD-10-CM

## 2019-01-10 DIAGNOSIS — M47.816 LUMBAR FACET ARTHROPATHY: ICD-10-CM

## 2019-01-10 DIAGNOSIS — M54.16 LUMBAR RADICULOPATHY: ICD-10-CM

## 2019-01-10 DIAGNOSIS — M16.0 PRIMARY OSTEOARTHRITIS OF BOTH HIPS: ICD-10-CM

## 2019-01-10 DIAGNOSIS — M16.11 PRIMARY OSTEOARTHRITIS OF RIGHT HIP: ICD-10-CM

## 2019-01-10 PROCEDURE — 97110 THERAPEUTIC EXERCISES: CPT

## 2019-01-11 ASSESSMENT — ENCOUNTER SYMPTOMS
VOICE CHANGE: 0
VOMITING: 0
SHORTNESS OF BREATH: 0
TROUBLE SWALLOWING: 0
COUGH: 0
RHINORRHEA: 0

## 2019-01-12 ENCOUNTER — HOSPITAL ENCOUNTER (OUTPATIENT)
Dept: SLEEP CENTER | Age: 64
Discharge: HOME | End: 2019-01-12
Payer: MEDICAID

## 2019-01-12 DIAGNOSIS — G47.33 OSA (OBSTRUCTIVE SLEEP APNEA): Primary | ICD-10-CM

## 2019-01-12 PROCEDURE — 95811 POLYSOM 6/>YRS CPAP 4/> PARM: CPT

## 2019-01-14 ENCOUNTER — TREATMENT (OUTPATIENT)
Dept: PHYSICAL THERAPY | Age: 64
End: 2019-01-14
Payer: MEDICAID

## 2019-01-14 ENCOUNTER — OFFICE VISIT (OUTPATIENT)
Dept: PAIN MANAGEMENT | Age: 64
End: 2019-01-14
Payer: MEDICAID

## 2019-01-14 VITALS
SYSTOLIC BLOOD PRESSURE: 124 MMHG | WEIGHT: 223 LBS | OXYGEN SATURATION: 94 % | HEIGHT: 66 IN | TEMPERATURE: 98.6 F | HEART RATE: 78 BPM | DIASTOLIC BLOOD PRESSURE: 72 MMHG | BODY MASS INDEX: 35.84 KG/M2 | RESPIRATION RATE: 18 BRPM

## 2019-01-14 DIAGNOSIS — G89.4 CHRONIC PAIN SYNDROME: ICD-10-CM

## 2019-01-14 DIAGNOSIS — Z96.641 STATUS POST TOTAL HIP REPLACEMENT, RIGHT: ICD-10-CM

## 2019-01-14 DIAGNOSIS — M47.816 LUMBAR FACET ARTHROPATHY: ICD-10-CM

## 2019-01-14 DIAGNOSIS — M25.561 CHRONIC PAIN OF RIGHT KNEE: ICD-10-CM

## 2019-01-14 DIAGNOSIS — M47.816 LUMBAR SPONDYLOSIS: ICD-10-CM

## 2019-01-14 DIAGNOSIS — G89.29 CHRONIC PAIN OF RIGHT KNEE: ICD-10-CM

## 2019-01-14 DIAGNOSIS — M54.16 LUMBAR RADICULOPATHY: ICD-10-CM

## 2019-01-14 DIAGNOSIS — M43.16 SPONDYLOLISTHESIS AT L3-L4 LEVEL: ICD-10-CM

## 2019-01-14 DIAGNOSIS — M17.12 PRIMARY OSTEOARTHRITIS OF LEFT KNEE: Primary | ICD-10-CM

## 2019-01-14 DIAGNOSIS — M16.0 PRIMARY OSTEOARTHRITIS OF BOTH HIPS: ICD-10-CM

## 2019-01-14 DIAGNOSIS — M25.562 CHRONIC PAIN OF LEFT KNEE: ICD-10-CM

## 2019-01-14 DIAGNOSIS — G89.29 CHRONIC PAIN OF LEFT KNEE: ICD-10-CM

## 2019-01-14 DIAGNOSIS — M17.12 PRIMARY OSTEOARTHRITIS OF LEFT KNEE: ICD-10-CM

## 2019-01-14 DIAGNOSIS — M16.11 PRIMARY OSTEOARTHRITIS OF RIGHT HIP: ICD-10-CM

## 2019-01-14 PROCEDURE — 99213 OFFICE O/P EST LOW 20 MIN: CPT | Performed by: PHYSICIAN ASSISTANT

## 2019-01-14 PROCEDURE — 97110 THERAPEUTIC EXERCISES: CPT

## 2019-01-14 RX ORDER — ACETAMINOPHEN AND CODEINE PHOSPHATE 300; 30 MG/1; MG/1
1 TABLET ORAL DAILY PRN
Qty: 30 TABLET | Refills: 0 | Status: SHIPPED | OUTPATIENT
Start: 2019-01-14 | End: 2019-02-13 | Stop reason: SDUPTHER

## 2019-01-16 ENCOUNTER — TREATMENT (OUTPATIENT)
Dept: PHYSICAL THERAPY | Age: 64
End: 2019-01-16
Payer: MEDICAID

## 2019-01-16 DIAGNOSIS — M25.562 CHRONIC PAIN OF LEFT KNEE: ICD-10-CM

## 2019-01-16 DIAGNOSIS — M17.12 PRIMARY OSTEOARTHRITIS OF LEFT KNEE: Primary | ICD-10-CM

## 2019-01-16 DIAGNOSIS — M43.16 SPONDYLOLISTHESIS AT L3-L4 LEVEL: ICD-10-CM

## 2019-01-16 DIAGNOSIS — G89.29 CHRONIC PAIN OF RIGHT KNEE: ICD-10-CM

## 2019-01-16 DIAGNOSIS — M54.16 LUMBAR RADICULOPATHY: ICD-10-CM

## 2019-01-16 DIAGNOSIS — M47.816 LUMBAR FACET ARTHROPATHY: ICD-10-CM

## 2019-01-16 DIAGNOSIS — M16.0 PRIMARY OSTEOARTHRITIS OF BOTH HIPS: ICD-10-CM

## 2019-01-16 DIAGNOSIS — G89.4 CHRONIC PAIN SYNDROME: ICD-10-CM

## 2019-01-16 DIAGNOSIS — M47.816 LUMBAR SPONDYLOSIS: ICD-10-CM

## 2019-01-16 DIAGNOSIS — Z96.641 STATUS POST TOTAL HIP REPLACEMENT, RIGHT: ICD-10-CM

## 2019-01-16 DIAGNOSIS — M25.561 CHRONIC PAIN OF RIGHT KNEE: ICD-10-CM

## 2019-01-16 DIAGNOSIS — G89.29 CHRONIC PAIN OF LEFT KNEE: ICD-10-CM

## 2019-01-16 DIAGNOSIS — M16.11 PRIMARY OSTEOARTHRITIS OF RIGHT HIP: ICD-10-CM

## 2019-01-16 PROCEDURE — 97110 THERAPEUTIC EXERCISES: CPT

## 2019-01-22 ENCOUNTER — TREATMENT (OUTPATIENT)
Dept: PHYSICAL THERAPY | Age: 64
End: 2019-01-22
Payer: MEDICAID

## 2019-01-22 DIAGNOSIS — M25.562 CHRONIC PAIN OF LEFT KNEE: ICD-10-CM

## 2019-01-22 DIAGNOSIS — M25.561 CHRONIC PAIN OF RIGHT KNEE: ICD-10-CM

## 2019-01-22 DIAGNOSIS — G89.29 CHRONIC PAIN OF RIGHT KNEE: ICD-10-CM

## 2019-01-22 DIAGNOSIS — M17.12 PRIMARY OSTEOARTHRITIS OF LEFT KNEE: Primary | ICD-10-CM

## 2019-01-22 DIAGNOSIS — M16.0 PRIMARY OSTEOARTHRITIS OF BOTH HIPS: ICD-10-CM

## 2019-01-22 DIAGNOSIS — M47.816 LUMBAR FACET ARTHROPATHY: ICD-10-CM

## 2019-01-22 DIAGNOSIS — M47.816 LUMBAR SPONDYLOSIS: ICD-10-CM

## 2019-01-22 DIAGNOSIS — M16.11 PRIMARY OSTEOARTHRITIS OF RIGHT HIP: ICD-10-CM

## 2019-01-22 DIAGNOSIS — G89.4 CHRONIC PAIN SYNDROME: ICD-10-CM

## 2019-01-22 DIAGNOSIS — M43.16 SPONDYLOLISTHESIS AT L3-L4 LEVEL: ICD-10-CM

## 2019-01-22 DIAGNOSIS — G89.29 CHRONIC PAIN OF LEFT KNEE: ICD-10-CM

## 2019-01-22 DIAGNOSIS — Z96.641 STATUS POST TOTAL HIP REPLACEMENT, RIGHT: ICD-10-CM

## 2019-01-22 DIAGNOSIS — M54.16 LUMBAR RADICULOPATHY: ICD-10-CM

## 2019-01-22 PROCEDURE — 97110 THERAPEUTIC EXERCISES: CPT | Performed by: PHYSICAL THERAPIST

## 2019-01-25 ENCOUNTER — TREATMENT (OUTPATIENT)
Dept: PHYSICAL THERAPY | Age: 64
End: 2019-01-25
Payer: MEDICAID

## 2019-01-25 DIAGNOSIS — G89.29 CHRONIC PAIN OF RIGHT KNEE: ICD-10-CM

## 2019-01-25 DIAGNOSIS — M43.16 SPONDYLOLISTHESIS AT L3-L4 LEVEL: ICD-10-CM

## 2019-01-25 DIAGNOSIS — M16.11 PRIMARY OSTEOARTHRITIS OF RIGHT HIP: ICD-10-CM

## 2019-01-25 DIAGNOSIS — M54.16 LUMBAR RADICULOPATHY: ICD-10-CM

## 2019-01-25 DIAGNOSIS — G89.29 CHRONIC PAIN OF LEFT KNEE: ICD-10-CM

## 2019-01-25 DIAGNOSIS — G89.4 CHRONIC PAIN SYNDROME: ICD-10-CM

## 2019-01-25 DIAGNOSIS — M25.561 CHRONIC PAIN OF RIGHT KNEE: ICD-10-CM

## 2019-01-25 DIAGNOSIS — M16.0 PRIMARY OSTEOARTHRITIS OF BOTH HIPS: ICD-10-CM

## 2019-01-25 DIAGNOSIS — M17.12 PRIMARY OSTEOARTHRITIS OF LEFT KNEE: Primary | ICD-10-CM

## 2019-01-25 DIAGNOSIS — M47.816 LUMBAR FACET ARTHROPATHY: ICD-10-CM

## 2019-01-25 DIAGNOSIS — M47.816 LUMBAR SPONDYLOSIS: ICD-10-CM

## 2019-01-25 DIAGNOSIS — M25.562 CHRONIC PAIN OF LEFT KNEE: ICD-10-CM

## 2019-01-25 DIAGNOSIS — Z96.641 STATUS POST TOTAL HIP REPLACEMENT, RIGHT: ICD-10-CM

## 2019-01-25 PROCEDURE — 97110 THERAPEUTIC EXERCISES: CPT

## 2019-01-29 ENCOUNTER — TREATMENT (OUTPATIENT)
Dept: PHYSICAL THERAPY | Age: 64
End: 2019-01-29
Payer: MEDICAID

## 2019-01-29 DIAGNOSIS — M25.562 CHRONIC PAIN OF LEFT KNEE: ICD-10-CM

## 2019-01-29 DIAGNOSIS — M47.816 LUMBAR FACET ARTHROPATHY: ICD-10-CM

## 2019-01-29 DIAGNOSIS — M16.11 PRIMARY OSTEOARTHRITIS OF RIGHT HIP: ICD-10-CM

## 2019-01-29 DIAGNOSIS — M54.16 LUMBAR RADICULOPATHY: ICD-10-CM

## 2019-01-29 DIAGNOSIS — Z96.641 STATUS POST TOTAL HIP REPLACEMENT, RIGHT: ICD-10-CM

## 2019-01-29 DIAGNOSIS — G89.4 CHRONIC PAIN SYNDROME: ICD-10-CM

## 2019-01-29 DIAGNOSIS — G89.29 CHRONIC PAIN OF LEFT KNEE: ICD-10-CM

## 2019-01-29 DIAGNOSIS — M43.16 SPONDYLOLISTHESIS AT L3-L4 LEVEL: ICD-10-CM

## 2019-01-29 DIAGNOSIS — M16.0 PRIMARY OSTEOARTHRITIS OF BOTH HIPS: ICD-10-CM

## 2019-01-29 DIAGNOSIS — G89.29 CHRONIC PAIN OF RIGHT KNEE: ICD-10-CM

## 2019-01-29 DIAGNOSIS — M17.12 PRIMARY OSTEOARTHRITIS OF LEFT KNEE: Primary | ICD-10-CM

## 2019-01-29 DIAGNOSIS — M47.816 LUMBAR SPONDYLOSIS: ICD-10-CM

## 2019-01-29 DIAGNOSIS — M25.561 CHRONIC PAIN OF RIGHT KNEE: ICD-10-CM

## 2019-01-29 PROCEDURE — 97110 THERAPEUTIC EXERCISES: CPT

## 2019-02-01 ENCOUNTER — TREATMENT (OUTPATIENT)
Dept: PHYSICAL THERAPY | Age: 64
End: 2019-02-01
Payer: MEDICAID

## 2019-02-01 DIAGNOSIS — M16.11 PRIMARY OSTEOARTHRITIS OF RIGHT HIP: ICD-10-CM

## 2019-02-01 DIAGNOSIS — G89.29 CHRONIC PAIN OF RIGHT KNEE: Primary | ICD-10-CM

## 2019-02-01 DIAGNOSIS — M16.0 PRIMARY OSTEOARTHRITIS OF BOTH HIPS: ICD-10-CM

## 2019-02-01 DIAGNOSIS — M54.16 LUMBAR RADICULOPATHY: ICD-10-CM

## 2019-02-01 DIAGNOSIS — G89.29 CHRONIC PAIN OF LEFT KNEE: ICD-10-CM

## 2019-02-01 DIAGNOSIS — M43.16 SPONDYLOLISTHESIS AT L3-L4 LEVEL: ICD-10-CM

## 2019-02-01 DIAGNOSIS — M25.562 CHRONIC PAIN OF LEFT KNEE: ICD-10-CM

## 2019-02-01 DIAGNOSIS — M47.816 LUMBAR SPONDYLOSIS: ICD-10-CM

## 2019-02-01 DIAGNOSIS — M17.12 PRIMARY OSTEOARTHRITIS OF LEFT KNEE: ICD-10-CM

## 2019-02-01 DIAGNOSIS — M25.561 CHRONIC PAIN OF RIGHT KNEE: Primary | ICD-10-CM

## 2019-02-01 DIAGNOSIS — G89.4 CHRONIC PAIN SYNDROME: ICD-10-CM

## 2019-02-01 DIAGNOSIS — M47.816 LUMBAR FACET ARTHROPATHY: ICD-10-CM

## 2019-02-01 DIAGNOSIS — Z96.641 STATUS POST TOTAL HIP REPLACEMENT, RIGHT: ICD-10-CM

## 2019-02-01 PROCEDURE — 97110 THERAPEUTIC EXERCISES: CPT

## 2019-02-01 PROCEDURE — G8978 MOBILITY CURRENT STATUS: HCPCS

## 2019-02-01 PROCEDURE — G8979 MOBILITY GOAL STATUS: HCPCS

## 2019-02-04 ENCOUNTER — TELEPHONE (OUTPATIENT)
Dept: ORTHOPEDIC SURGERY | Age: 64
End: 2019-02-04

## 2019-02-05 ENCOUNTER — TREATMENT (OUTPATIENT)
Dept: PHYSICAL THERAPY | Age: 64
End: 2019-02-05
Payer: MEDICAID

## 2019-02-05 DIAGNOSIS — M17.12 PRIMARY OSTEOARTHRITIS OF LEFT KNEE: ICD-10-CM

## 2019-02-05 DIAGNOSIS — M47.816 LUMBAR SPONDYLOSIS: ICD-10-CM

## 2019-02-05 DIAGNOSIS — Z96.641 STATUS POST TOTAL HIP REPLACEMENT, RIGHT: ICD-10-CM

## 2019-02-05 DIAGNOSIS — G89.4 CHRONIC PAIN SYNDROME: ICD-10-CM

## 2019-02-05 DIAGNOSIS — M47.816 LUMBAR FACET ARTHROPATHY: ICD-10-CM

## 2019-02-05 DIAGNOSIS — M16.11 PRIMARY OSTEOARTHRITIS OF RIGHT HIP: ICD-10-CM

## 2019-02-05 DIAGNOSIS — G89.29 CHRONIC PAIN OF RIGHT KNEE: Primary | ICD-10-CM

## 2019-02-05 DIAGNOSIS — G89.29 CHRONIC PAIN OF LEFT KNEE: ICD-10-CM

## 2019-02-05 DIAGNOSIS — M25.561 CHRONIC PAIN OF RIGHT KNEE: Primary | ICD-10-CM

## 2019-02-05 DIAGNOSIS — M16.0 PRIMARY OSTEOARTHRITIS OF BOTH HIPS: ICD-10-CM

## 2019-02-05 DIAGNOSIS — M25.562 CHRONIC PAIN OF LEFT KNEE: ICD-10-CM

## 2019-02-05 DIAGNOSIS — M54.16 LUMBAR RADICULOPATHY: ICD-10-CM

## 2019-02-05 DIAGNOSIS — M43.16 SPONDYLOLISTHESIS AT L3-L4 LEVEL: ICD-10-CM

## 2019-02-05 PROCEDURE — 97110 THERAPEUTIC EXERCISES: CPT

## 2019-02-08 ENCOUNTER — TREATMENT (OUTPATIENT)
Dept: PHYSICAL THERAPY | Age: 64
End: 2019-02-08
Payer: MEDICAID

## 2019-02-08 DIAGNOSIS — M47.816 LUMBAR SPONDYLOSIS: ICD-10-CM

## 2019-02-08 DIAGNOSIS — M25.561 CHRONIC PAIN OF RIGHT KNEE: Primary | ICD-10-CM

## 2019-02-08 DIAGNOSIS — M54.16 LUMBAR RADICULOPATHY: ICD-10-CM

## 2019-02-08 DIAGNOSIS — Z96.641 STATUS POST TOTAL HIP REPLACEMENT, RIGHT: ICD-10-CM

## 2019-02-08 DIAGNOSIS — M47.816 LUMBAR FACET ARTHROPATHY: ICD-10-CM

## 2019-02-08 DIAGNOSIS — M16.11 PRIMARY OSTEOARTHRITIS OF RIGHT HIP: ICD-10-CM

## 2019-02-08 DIAGNOSIS — M25.562 CHRONIC PAIN OF LEFT KNEE: ICD-10-CM

## 2019-02-08 DIAGNOSIS — G89.29 CHRONIC PAIN OF LEFT KNEE: ICD-10-CM

## 2019-02-08 DIAGNOSIS — G89.29 CHRONIC PAIN OF RIGHT KNEE: Primary | ICD-10-CM

## 2019-02-08 DIAGNOSIS — M43.16 SPONDYLOLISTHESIS AT L3-L4 LEVEL: ICD-10-CM

## 2019-02-08 DIAGNOSIS — M17.12 PRIMARY OSTEOARTHRITIS OF LEFT KNEE: ICD-10-CM

## 2019-02-08 DIAGNOSIS — M16.0 PRIMARY OSTEOARTHRITIS OF BOTH HIPS: ICD-10-CM

## 2019-02-08 DIAGNOSIS — G89.4 CHRONIC PAIN SYNDROME: ICD-10-CM

## 2019-02-08 PROCEDURE — 97110 THERAPEUTIC EXERCISES: CPT

## 2019-02-13 ENCOUNTER — OFFICE VISIT (OUTPATIENT)
Dept: PAIN MANAGEMENT | Age: 64
End: 2019-02-13
Payer: MEDICAID

## 2019-02-13 VITALS
OXYGEN SATURATION: 98 % | WEIGHT: 224 LBS | BODY MASS INDEX: 36 KG/M2 | HEIGHT: 66 IN | DIASTOLIC BLOOD PRESSURE: 64 MMHG | SYSTOLIC BLOOD PRESSURE: 128 MMHG | RESPIRATION RATE: 16 BRPM | HEART RATE: 94 BPM

## 2019-02-13 DIAGNOSIS — G89.29 CHRONIC PAIN OF RIGHT KNEE: ICD-10-CM

## 2019-02-13 DIAGNOSIS — G89.29 CHRONIC PAIN OF LEFT KNEE: ICD-10-CM

## 2019-02-13 DIAGNOSIS — M25.562 CHRONIC PAIN OF LEFT KNEE: ICD-10-CM

## 2019-02-13 DIAGNOSIS — M47.816 LUMBAR FACET ARTHROPATHY: ICD-10-CM

## 2019-02-13 DIAGNOSIS — Z96.641 STATUS POST TOTAL HIP REPLACEMENT, RIGHT: ICD-10-CM

## 2019-02-13 DIAGNOSIS — M16.11 PRIMARY OSTEOARTHRITIS OF RIGHT HIP: ICD-10-CM

## 2019-02-13 DIAGNOSIS — M25.561 CHRONIC PAIN OF RIGHT KNEE: ICD-10-CM

## 2019-02-13 DIAGNOSIS — G89.4 CHRONIC PAIN SYNDROME: ICD-10-CM

## 2019-02-13 DIAGNOSIS — M54.16 LUMBAR RADICULOPATHY: ICD-10-CM

## 2019-02-13 DIAGNOSIS — M47.816 LUMBAR SPONDYLOSIS: ICD-10-CM

## 2019-02-13 DIAGNOSIS — M16.0 PRIMARY OSTEOARTHRITIS OF BOTH HIPS: ICD-10-CM

## 2019-02-13 DIAGNOSIS — M43.16 SPONDYLOLISTHESIS AT L3-L4 LEVEL: ICD-10-CM

## 2019-02-13 DIAGNOSIS — M17.12 PRIMARY OSTEOARTHRITIS OF LEFT KNEE: ICD-10-CM

## 2019-02-13 PROCEDURE — 99213 OFFICE O/P EST LOW 20 MIN: CPT

## 2019-02-13 PROCEDURE — 99213 OFFICE O/P EST LOW 20 MIN: CPT | Performed by: PHYSICIAN ASSISTANT

## 2019-02-13 RX ORDER — ACETAMINOPHEN AND CODEINE PHOSPHATE 300; 30 MG/1; MG/1
1 TABLET ORAL DAILY PRN
Qty: 30 TABLET | Refills: 0 | Status: SHIPPED | OUTPATIENT
Start: 2019-02-13 | End: 2019-03-13 | Stop reason: SDUPTHER

## 2019-02-14 ENCOUNTER — OFFICE VISIT (OUTPATIENT)
Dept: ORTHOPEDIC SURGERY | Age: 64
End: 2019-02-14
Payer: MEDICAID

## 2019-02-14 DIAGNOSIS — M17.0 PRIMARY OSTEOARTHRITIS OF BOTH KNEES: Primary | ICD-10-CM

## 2019-02-14 PROCEDURE — 20610 DRAIN/INJ JOINT/BURSA W/O US: CPT | Performed by: ORTHOPAEDIC SURGERY

## 2019-02-18 ENCOUNTER — OFFICE VISIT (OUTPATIENT)
Dept: ORTHOPEDIC SURGERY | Age: 64
End: 2019-02-18
Payer: MEDICAID

## 2019-02-18 VITALS — WEIGHT: 224 LBS | TEMPERATURE: 98 F | BODY MASS INDEX: 36 KG/M2 | HEIGHT: 66 IN

## 2019-02-18 DIAGNOSIS — M65.341 TRIGGER FINGER, RIGHT RING FINGER: Primary | ICD-10-CM

## 2019-02-18 PROCEDURE — 20550 NJX 1 TENDON SHEATH/LIGAMENT: CPT | Performed by: ORTHOPAEDIC SURGERY

## 2019-02-18 PROCEDURE — 99213 OFFICE O/P EST LOW 20 MIN: CPT | Performed by: ORTHOPAEDIC SURGERY

## 2019-02-18 RX ORDER — TRIAMCINOLONE ACETONIDE 40 MG/ML
10 INJECTION, SUSPENSION INTRA-ARTICULAR; INTRAMUSCULAR ONCE
Status: COMPLETED | OUTPATIENT
Start: 2019-02-18 | End: 2019-02-18

## 2019-02-18 RX ADMIN — TRIAMCINOLONE ACETONIDE 10 MG: 40 INJECTION, SUSPENSION INTRA-ARTICULAR; INTRAMUSCULAR at 13:14

## 2019-02-21 ENCOUNTER — OFFICE VISIT (OUTPATIENT)
Dept: ORTHOPEDIC SURGERY | Age: 64
End: 2019-02-21
Payer: MEDICAID

## 2019-02-21 DIAGNOSIS — M17.12 PRIMARY OSTEOARTHRITIS OF LEFT KNEE: ICD-10-CM

## 2019-02-21 DIAGNOSIS — M17.11 PRIMARY OSTEOARTHRITIS OF RIGHT KNEE: Primary | ICD-10-CM

## 2019-02-21 PROCEDURE — 20610 DRAIN/INJ JOINT/BURSA W/O US: CPT | Performed by: ORTHOPAEDIC SURGERY

## 2019-02-21 RX ORDER — HYALURONATE SODIUM 10 MG/ML
20 SYRINGE (ML) INTRAARTICULAR ONCE
Status: COMPLETED | OUTPATIENT
Start: 2019-02-21 | End: 2019-02-21

## 2019-02-21 RX ADMIN — Medication 20 MG: at 13:58

## 2019-02-28 ENCOUNTER — OFFICE VISIT (OUTPATIENT)
Dept: ORTHOPEDIC SURGERY | Age: 64
End: 2019-02-28
Payer: MEDICAID

## 2019-02-28 DIAGNOSIS — M17.12 PRIMARY OSTEOARTHRITIS OF LEFT KNEE: ICD-10-CM

## 2019-02-28 DIAGNOSIS — M17.11 PRIMARY OSTEOARTHRITIS OF RIGHT KNEE: Primary | ICD-10-CM

## 2019-02-28 PROCEDURE — 20610 DRAIN/INJ JOINT/BURSA W/O US: CPT | Performed by: ORTHOPAEDIC SURGERY

## 2019-03-04 ENCOUNTER — OFFICE VISIT (OUTPATIENT)
Dept: NEUROLOGY | Age: 64
End: 2019-03-04
Payer: MEDICAID

## 2019-03-04 VITALS
HEIGHT: 66 IN | HEART RATE: 61 BPM | SYSTOLIC BLOOD PRESSURE: 125 MMHG | OXYGEN SATURATION: 100 % | BODY MASS INDEX: 36.56 KG/M2 | RESPIRATION RATE: 16 BRPM | DIASTOLIC BLOOD PRESSURE: 76 MMHG | WEIGHT: 227.5 LBS

## 2019-03-04 DIAGNOSIS — R41.3 MEMORY DIFFICULTIES: ICD-10-CM

## 2019-03-04 DIAGNOSIS — M54.17 L-S RADICULOPATHY: Primary | ICD-10-CM

## 2019-03-04 PROCEDURE — 99214 OFFICE O/P EST MOD 30 MIN: CPT | Performed by: CLINICAL NURSE SPECIALIST

## 2019-03-04 RX ORDER — HYALURONATE SODIUM 10 MG/ML
20 SYRINGE (ML) INTRAARTICULAR ONCE
Status: COMPLETED | OUTPATIENT
Start: 2019-03-04 | End: 2019-03-04

## 2019-03-04 RX ADMIN — Medication 20 MG: at 08:48

## 2019-03-07 ENCOUNTER — OFFICE VISIT (OUTPATIENT)
Dept: ORTHOPEDIC SURGERY | Age: 64
End: 2019-03-07
Payer: MEDICAID

## 2019-03-07 VITALS — WEIGHT: 229 LBS | HEIGHT: 66 IN | TEMPERATURE: 98 F | BODY MASS INDEX: 36.8 KG/M2

## 2019-03-07 DIAGNOSIS — M65.341 TRIGGER FINGER, RIGHT RING FINGER: Primary | ICD-10-CM

## 2019-03-07 PROCEDURE — 99214 OFFICE O/P EST MOD 30 MIN: CPT | Performed by: ORTHOPAEDIC SURGERY

## 2019-03-13 ENCOUNTER — OFFICE VISIT (OUTPATIENT)
Dept: PAIN MANAGEMENT | Age: 64
End: 2019-03-13
Payer: MEDICAID

## 2019-03-13 VITALS
BODY MASS INDEX: 36.16 KG/M2 | TEMPERATURE: 98.7 F | OXYGEN SATURATION: 98 % | HEART RATE: 74 BPM | HEIGHT: 66 IN | DIASTOLIC BLOOD PRESSURE: 70 MMHG | WEIGHT: 225 LBS | SYSTOLIC BLOOD PRESSURE: 120 MMHG | RESPIRATION RATE: 18 BRPM

## 2019-03-13 DIAGNOSIS — Z96.641 STATUS POST TOTAL HIP REPLACEMENT, RIGHT: ICD-10-CM

## 2019-03-13 DIAGNOSIS — M17.12 PRIMARY OSTEOARTHRITIS OF LEFT KNEE: ICD-10-CM

## 2019-03-13 DIAGNOSIS — M47.816 LUMBAR SPONDYLOSIS: ICD-10-CM

## 2019-03-13 DIAGNOSIS — M43.16 SPONDYLOLISTHESIS AT L3-L4 LEVEL: ICD-10-CM

## 2019-03-13 DIAGNOSIS — M16.0 PRIMARY OSTEOARTHRITIS OF BOTH HIPS: ICD-10-CM

## 2019-03-13 DIAGNOSIS — M16.11 PRIMARY OSTEOARTHRITIS OF RIGHT HIP: ICD-10-CM

## 2019-03-13 DIAGNOSIS — M47.816 LUMBAR FACET ARTHROPATHY: ICD-10-CM

## 2019-03-13 DIAGNOSIS — M54.16 LUMBAR RADICULOPATHY: ICD-10-CM

## 2019-03-13 DIAGNOSIS — M25.561 CHRONIC PAIN OF RIGHT KNEE: ICD-10-CM

## 2019-03-13 DIAGNOSIS — G89.29 CHRONIC PAIN OF RIGHT KNEE: ICD-10-CM

## 2019-03-13 DIAGNOSIS — G89.29 CHRONIC PAIN OF LEFT KNEE: ICD-10-CM

## 2019-03-13 DIAGNOSIS — M25.562 CHRONIC PAIN OF LEFT KNEE: ICD-10-CM

## 2019-03-13 DIAGNOSIS — G89.4 CHRONIC PAIN SYNDROME: ICD-10-CM

## 2019-03-13 PROCEDURE — 99213 OFFICE O/P EST LOW 20 MIN: CPT | Performed by: PHYSICIAN ASSISTANT

## 2019-03-13 RX ORDER — ACETAMINOPHEN AND CODEINE PHOSPHATE 300; 30 MG/1; MG/1
1 TABLET ORAL DAILY PRN
Qty: 30 TABLET | Refills: 0 | Status: SHIPPED | OUTPATIENT
Start: 2019-04-14 | End: 2019-05-06 | Stop reason: SDUPTHER

## 2019-03-13 RX ORDER — ACETAMINOPHEN AND CODEINE PHOSPHATE 300; 30 MG/1; MG/1
1 TABLET ORAL DAILY PRN
Qty: 30 TABLET | Refills: 0 | Status: SHIPPED | OUTPATIENT
Start: 2019-03-15 | End: 2019-03-13 | Stop reason: SDUPTHER

## 2019-03-19 ENCOUNTER — TELEPHONE (OUTPATIENT)
Dept: NEUROLOGY | Age: 64
End: 2019-03-19

## 2019-03-20 ENCOUNTER — ANESTHESIA EVENT (OUTPATIENT)
Dept: OPERATING ROOM | Age: 64
End: 2019-03-20
Payer: MEDICAID

## 2019-03-22 ENCOUNTER — ANESTHESIA (OUTPATIENT)
Dept: OPERATING ROOM | Age: 64
End: 2019-03-22
Payer: MEDICAID

## 2019-03-22 ENCOUNTER — HOSPITAL ENCOUNTER (OUTPATIENT)
Age: 64
Setting detail: OUTPATIENT SURGERY
Discharge: HOME | End: 2019-03-22
Attending: ORTHOPAEDIC SURGERY | Admitting: ORTHOPAEDIC SURGERY
Payer: MEDICAID

## 2019-03-22 VITALS
DIASTOLIC BLOOD PRESSURE: 66 MMHG | OXYGEN SATURATION: 100 % | SYSTOLIC BLOOD PRESSURE: 132 MMHG | RESPIRATION RATE: 17 BRPM

## 2019-03-22 VITALS
HEART RATE: 52 BPM | OXYGEN SATURATION: 99 % | HEIGHT: 66 IN | SYSTOLIC BLOOD PRESSURE: 151 MMHG | WEIGHT: 225.6 LBS | RESPIRATION RATE: 16 BRPM | TEMPERATURE: 97 F | DIASTOLIC BLOOD PRESSURE: 77 MMHG | BODY MASS INDEX: 36.26 KG/M2

## 2019-03-22 DIAGNOSIS — M65.341 TRIGGER RING FINGER OF RIGHT HAND: Primary | ICD-10-CM

## 2019-03-22 LAB — METER GLUCOSE: 99 MG/DL (ref 74–99)

## 2019-03-22 PROCEDURE — 2500000003 HC RX 250 WO HCPCS: Performed by: NURSE PRACTITIONER

## 2019-03-22 PROCEDURE — 26055 INCISE FINGER TENDON SHEATH: CPT | Performed by: ORTHOPAEDIC SURGERY

## 2019-03-22 PROCEDURE — 7100000011 HC PHASE II RECOVERY - ADDTL 15 MIN: Performed by: ORTHOPAEDIC SURGERY

## 2019-03-22 PROCEDURE — 3600000002 HC SURGERY LEVEL 2 BASE: Performed by: ORTHOPAEDIC SURGERY

## 2019-03-22 PROCEDURE — 2500000003 HC RX 250 WO HCPCS: Performed by: ORTHOPAEDIC SURGERY

## 2019-03-22 PROCEDURE — 2500000003 HC RX 250 WO HCPCS: Performed by: NURSE ANESTHETIST, CERTIFIED REGISTERED

## 2019-03-22 PROCEDURE — 3600000012 HC SURGERY LEVEL 2 ADDTL 15MIN: Performed by: ORTHOPAEDIC SURGERY

## 2019-03-22 PROCEDURE — 6360000002 HC RX W HCPCS: Performed by: NURSE ANESTHETIST, CERTIFIED REGISTERED

## 2019-03-22 PROCEDURE — 3700000001 HC ADD 15 MINUTES (ANESTHESIA): Performed by: ORTHOPAEDIC SURGERY

## 2019-03-22 PROCEDURE — 2580000003 HC RX 258: Performed by: ANESTHESIOLOGY

## 2019-03-22 PROCEDURE — 3700000000 HC ANESTHESIA ATTENDED CARE: Performed by: ORTHOPAEDIC SURGERY

## 2019-03-22 PROCEDURE — 2709999900 HC NON-CHARGEABLE SUPPLY: Performed by: ORTHOPAEDIC SURGERY

## 2019-03-22 PROCEDURE — 82962 GLUCOSE BLOOD TEST: CPT

## 2019-03-22 PROCEDURE — 7100000010 HC PHASE II RECOVERY - FIRST 15 MIN: Performed by: ORTHOPAEDIC SURGERY

## 2019-03-22 RX ORDER — SODIUM CHLORIDE, SODIUM LACTATE, POTASSIUM CHLORIDE, CALCIUM CHLORIDE 600; 310; 30; 20 MG/100ML; MG/100ML; MG/100ML; MG/100ML
INJECTION, SOLUTION INTRAVENOUS CONTINUOUS
Status: DISCONTINUED | OUTPATIENT
Start: 2019-03-22 | End: 2019-03-22 | Stop reason: HOSPADM

## 2019-03-22 RX ORDER — MIDAZOLAM HYDROCHLORIDE 1 MG/ML
INJECTION INTRAMUSCULAR; INTRAVENOUS PRN
Status: DISCONTINUED | OUTPATIENT
Start: 2019-03-22 | End: 2019-03-22 | Stop reason: SDUPTHER

## 2019-03-22 RX ORDER — CLINDAMYCIN PHOSPHATE 900 MG/50ML
900 INJECTION INTRAVENOUS ONCE
Status: COMPLETED | OUTPATIENT
Start: 2019-03-22 | End: 2019-03-22

## 2019-03-22 RX ORDER — ALFENTANIL HYDROCHLORIDE 500 UG/ML
INJECTION INTRAVENOUS PRN
Status: DISCONTINUED | OUTPATIENT
Start: 2019-03-22 | End: 2019-03-22 | Stop reason: SDUPTHER

## 2019-03-22 RX ORDER — PROPOFOL 10 MG/ML
INJECTION, EMULSION INTRAVENOUS PRN
Status: DISCONTINUED | OUTPATIENT
Start: 2019-03-22 | End: 2019-03-22 | Stop reason: SDUPTHER

## 2019-03-22 RX ORDER — HYDROCODONE BITARTRATE AND ACETAMINOPHEN 5; 325 MG/1; MG/1
1 TABLET ORAL
Status: DISCONTINUED | OUTPATIENT
Start: 2019-03-22 | End: 2019-03-22 | Stop reason: HOSPADM

## 2019-03-22 RX ORDER — LIDOCAINE HYDROCHLORIDE 10 MG/ML
INJECTION, SOLUTION INFILTRATION; PERINEURAL PRN
Status: DISCONTINUED | OUTPATIENT
Start: 2019-03-22 | End: 2019-03-22 | Stop reason: ALTCHOICE

## 2019-03-22 RX ORDER — ACETAMINOPHEN AND CODEINE PHOSPHATE 300; 30 MG/1; MG/1
1 TABLET ORAL EVERY 6 HOURS PRN
Qty: 28 TABLET | Refills: 0 | Status: SHIPPED | OUTPATIENT
Start: 2019-03-22 | End: 2019-03-29

## 2019-03-22 RX ADMIN — PROPOFOL 20 MG: 10 INJECTION, EMULSION INTRAVENOUS at 13:06

## 2019-03-22 RX ADMIN — CLINDAMYCIN PHOSPHATE 900 MG: 18 INJECTION, SOLUTION INTRAVENOUS at 13:13

## 2019-03-22 RX ADMIN — MIDAZOLAM 2 MG: 1 INJECTION INTRAMUSCULAR; INTRAVENOUS at 12:54

## 2019-03-22 RX ADMIN — ALFENTANIL HYDROCHLORIDE 250 MCG: 500 INJECTION INTRAVENOUS at 13:05

## 2019-03-22 RX ADMIN — PROPOFOL 20 MG: 10 INJECTION, EMULSION INTRAVENOUS at 13:13

## 2019-03-22 RX ADMIN — PROPOFOL 20 MG: 10 INJECTION, EMULSION INTRAVENOUS at 13:17

## 2019-03-22 RX ADMIN — ALFENTANIL HYDROCHLORIDE 250 MCG: 500 INJECTION INTRAVENOUS at 13:08

## 2019-03-22 RX ADMIN — PROPOFOL 30 MG: 10 INJECTION, EMULSION INTRAVENOUS at 13:03

## 2019-03-22 RX ADMIN — ALFENTANIL HYDROCHLORIDE 250 MCG: 500 INJECTION INTRAVENOUS at 13:02

## 2019-03-22 RX ADMIN — PROPOFOL 20 MG: 10 INJECTION, EMULSION INTRAVENOUS at 13:11

## 2019-03-22 RX ADMIN — PROPOFOL 10 MG: 10 INJECTION, EMULSION INTRAVENOUS at 13:21

## 2019-03-22 RX ADMIN — PROPOFOL 20 MG: 10 INJECTION, EMULSION INTRAVENOUS at 13:07

## 2019-03-22 RX ADMIN — PROPOFOL 20 MG: 10 INJECTION, EMULSION INTRAVENOUS at 13:09

## 2019-03-22 RX ADMIN — PROPOFOL 20 MG: 10 INJECTION, EMULSION INTRAVENOUS at 13:04

## 2019-03-22 RX ADMIN — PROPOFOL 10 MG: 10 INJECTION, EMULSION INTRAVENOUS at 13:19

## 2019-03-22 RX ADMIN — PROPOFOL 20 MG: 10 INJECTION, EMULSION INTRAVENOUS at 13:05

## 2019-03-22 RX ADMIN — SODIUM CHLORIDE, POTASSIUM CHLORIDE, SODIUM LACTATE AND CALCIUM CHLORIDE: 600; 310; 30; 20 INJECTION, SOLUTION INTRAVENOUS at 11:32

## 2019-03-22 RX ADMIN — ALFENTANIL HYDROCHLORIDE 250 MCG: 500 INJECTION INTRAVENOUS at 13:13

## 2019-03-22 RX ADMIN — PROPOFOL 60 MG: 10 INJECTION, EMULSION INTRAVENOUS at 13:02

## 2019-03-22 RX ADMIN — PROPOFOL 20 MG: 10 INJECTION, EMULSION INTRAVENOUS at 13:15

## 2019-03-22 RX ADMIN — SODIUM CHLORIDE, POTASSIUM CHLORIDE, SODIUM LACTATE AND CALCIUM CHLORIDE: 600; 310; 30; 20 INJECTION, SOLUTION INTRAVENOUS at 12:54

## 2019-03-22 ASSESSMENT — PAIN SCALES - GENERAL
PAINLEVEL_OUTOF10: 0

## 2019-03-22 ASSESSMENT — PAIN - FUNCTIONAL ASSESSMENT: PAIN_FUNCTIONAL_ASSESSMENT: 0-10

## 2019-03-27 DIAGNOSIS — M65.341 TRIGGER FINGER, RIGHT RING FINGER: Primary | ICD-10-CM

## 2019-03-27 RX ORDER — HYALURONATE SODIUM 10 MG/ML
20 SYRINGE (ML) INTRAARTICULAR ONCE
Status: COMPLETED | OUTPATIENT
Start: 2019-03-27 | End: 2019-03-27

## 2019-03-27 RX ORDER — HYDROCODONE BITARTRATE AND ACETAMINOPHEN 5; 325 MG/1; MG/1
1 TABLET ORAL EVERY 6 HOURS PRN
Qty: 28 TABLET | Refills: 0 | Status: SHIPPED | OUTPATIENT
Start: 2019-03-27 | End: 2019-04-03

## 2019-03-27 RX ADMIN — Medication 20 MG: at 09:37

## 2019-03-27 RX ADMIN — Medication 20 MG: at 09:36

## 2019-04-05 ENCOUNTER — OFFICE VISIT (OUTPATIENT)
Dept: ORTHOPEDIC SURGERY | Age: 64
End: 2019-04-05

## 2019-04-05 VITALS — WEIGHT: 230 LBS | BODY MASS INDEX: 36.96 KG/M2 | HEIGHT: 66 IN

## 2019-04-05 DIAGNOSIS — M65.341 TRIGGER FINGER, RIGHT RING FINGER: Primary | ICD-10-CM

## 2019-04-05 PROCEDURE — 99024 POSTOP FOLLOW-UP VISIT: CPT | Performed by: NURSE PRACTITIONER

## 2019-04-05 NOTE — PATIENT INSTRUCTIONS
The incision can be covered with a band aid but should not be immersed in water until it has completely healed. The patient will begin a home exercise program.    I recommend Tylenol #3 300-30 MG per tablet once every 4 hours PRN for pain as she has increased pain since surgery  We will see them back PRN  Patient Education        Trigger Finger Release: What to Expect at Home  Your Recovery  Your finger and hand may be sore and swollen for several days. It may be hard to move your finger at first. This usually gets better after several weeks. You may feel numbness or tingling near the cut, called an incision, that the doctor made. This feeling will probably get better in a few days, but it may take several months to completely go away. Your doctor will take out your stitches 1 to 2 weeks after surgery. It will probably take about 6 weeks for your finger to heal completely. Once healed, your finger may move easily without pain. How soon you can return to work depends on your job. If you can do your job without using the hand, you may be able to go back 1 or 2 days after surgery. But if your job requires you to do repeated finger movements, put pressure on your hand, or lift things, you may need to take up to 6 weeks off work. Your doctor can help you decide how much time you will need to take off work. This care sheet gives you a general idea about how long it will take for you to recover. But each person recovers at a different pace. Follow the steps below to get better as quickly as possible. How can you care for yourself at home? Activity    · Rest when you feel tired. Getting enough sleep will help you recover.     · Try to walk each day. Start by walking a little more than you did the day before. Bit by bit, increase the amount you walk.     · For 1 to 2 weeks after surgery, avoid using your hand.  This includes lifting things heavier than 1 to 2 pounds or doing repeated finger or hand movements, such as typing, using a computer mouse, washing windows, vacuuming, or chopping food. Do not use power tools, and avoid other activities that make your hand vibrate.     · Ask your doctor when you can drive again.     · You may be able to go back to work 1 or 2 days after surgery. It depends on the type of work you do and how you feel.     · You may shower, but do not get your hand wet until your doctor says it is okay. Keep the bandage dry by covering it with plastic. Do not take a bath, swim, use a hot tub, or soak your hand until your doctor says it is okay. Diet    · You can eat your normal diet. If your stomach is upset, try bland, low-fat foods like plain rice, broiled chicken, toast, and yogurt. Medicines    · Your doctor will tell you if and when you can restart your medicines. He or she will also give you instructions about taking any new medicines.     · If you take blood thinners, such as warfarin (Coumadin), clopidogrel (Plavix), or aspirin, be sure to talk to your doctor. He or she will tell you if and when to start taking those medicines again. Make sure that you understand exactly what your doctor wants you to do.     · Take pain medicines exactly as directed. ? If the doctor gave you a prescription medicine for pain, take it as prescribed. ? If you are not taking a prescription pain medicine, ask your doctor if you can take an over-the-counter medicine.     · If you think your pain medicine is making you sick to your stomach:  ? Take your medicine after meals (unless your doctor has told you not to). ? Ask your doctor for a different pain medicine.     · If your doctor prescribed antibiotics, take them as directed. Do not stop taking them just because you feel better. You need to take the full course of antibiotics. Incision care    · Leave the bandage on your hand until the doctor says it is okay to remove it.  This is usually 2 or 3 days after surgery.     · After the doctor says you can take off your bandage, wash the area daily with warm, soapy water and pat it dry. Don't use hydrogen peroxide or alcohol, which can slow healing. You may cover the area with a gauze bandage if it weeps or rubs against clothing. Change the bandage every day.     · Keep the area clean and dry. Exercise    · Gently bend and straighten your fingers throughout the day to keep them flexible and help reduce swelling.     · You may need finger and hand therapy. This helps you regain range of motion, strength, and  in your finger and hand. To get the best results, you need to do the exercises correctly and as often and as long as your doctor or your physical or occupational therapist tells you to. Ice and elevation    · Put ice or a cold pack on your hand and wrist for 10 to 20 minutes at a time. Try to do this every 1 to 2 hours for the next 3 days (when you are awake) or until the swelling goes down. Put a thin cloth between the ice and your skin.     · Prop up your hand on a pillow anytime you sit or lie down during the first 2 or 3 days after surgery. Try to keep the hand above the level of your heart. This will help reduce swelling. Follow-up care is a key part of your treatment and safety. Be sure to make and go to all appointments, and call your doctor if you are having problems. It's also a good idea to know your test results and keep a list of the medicines you take. When should you call for help? Call 911 anytime you think you may need emergency care.  For example, call if:    · You passed out (lost consciousness).     · You have severe trouble breathing.     · You have sudden chest pain and shortness of breath, or you cough up blood.    Call your doctor now or seek immediate medical care if:    · You have pain that does not get better after you take pain medicine.     · You have loose stitches, or your incision comes open.     · Your incision bleeds through a large bandage.     · You have signs of infection, such as:  ? Increased pain, swelling, warmth, or redness. ? Red streaks leading from the incision. ? Pus draining from the incision. ? Swollen lymph nodes in your neck, armpits, or groin. ? A fever.     · Your hand or fingers are cool or pale or change color.     · You have tingling or numbness in your hand or fingers.     · You cannot move your fingers.    Watch closely for any changes in your health, and be sure to contact your doctor if:    · You are not getting better as expected. Where can you learn more? Go to https://[employer]. org and sign in to your [employer] account. Enter S181 in the [employer] box to learn more about \"Trigger Finger Release: What to Expect at Home. \"     If you do not have an account, please click on the \"Sign Up Now\" link. Current as of: September 20, 2018  Content Version: 11.9  © 5766-0988 [employer]. Care instructions adapted under license by [facility] ([facility]). If you have questions about a medical condition or this instruction, always ask your healthcare professional. [place] [address] any warranty or liability for your use of this information.

## 2019-04-05 NOTE — PROGRESS NOTES
Tova Patient is here for a post op visit for Her trigger finger release right ring. The patient is doing well. She is on tylenol for pain which is not helping    Exam    The wound is clean, dry, no drainage. The  Sutures in place and will be removed today. .  Range of motion: range of motion limited. The swelling is diminished. Encounter Diagnosis   Name Primary?  Trigger finger, right ring finger Yes       The incision can be covered with a band aid but should not be immersed in water until it has completely healed.     The patient will begin a home exercise program.    I recommend Tylenol #3 for pain as she has increased pain since surgery  We will see them back PRN

## 2019-04-10 DIAGNOSIS — M17.11 PRIMARY OSTEOARTHRITIS OF RIGHT KNEE: Primary | ICD-10-CM

## 2019-04-12 ENCOUNTER — OFFICE VISIT (OUTPATIENT)
Dept: ORTHOPEDIC SURGERY | Age: 64
End: 2019-04-12
Payer: MEDICAID

## 2019-04-12 VITALS — HEIGHT: 66 IN | BODY MASS INDEX: 36.96 KG/M2 | WEIGHT: 230 LBS | TEMPERATURE: 98 F

## 2019-04-12 DIAGNOSIS — M17.11 PRIMARY OSTEOARTHRITIS OF RIGHT KNEE: Primary | ICD-10-CM

## 2019-04-12 DIAGNOSIS — M17.12 PRIMARY OSTEOARTHRITIS OF LEFT KNEE: ICD-10-CM

## 2019-04-12 PROCEDURE — 99213 OFFICE O/P EST LOW 20 MIN: CPT | Performed by: NURSE PRACTITIONER

## 2019-04-12 PROCEDURE — 20610 DRAIN/INJ JOINT/BURSA W/O US: CPT | Performed by: NURSE PRACTITIONER

## 2019-04-12 RX ORDER — TRIAMCINOLONE ACETONIDE 40 MG/ML
40 INJECTION, SUSPENSION INTRA-ARTICULAR; INTRAMUSCULAR ONCE
Status: COMPLETED | OUTPATIENT
Start: 2019-04-12 | End: 2019-04-12

## 2019-04-12 RX ADMIN — TRIAMCINOLONE ACETONIDE 40 MG: 40 INJECTION, SUSPENSION INTRA-ARTICULAR; INTRAMUSCULAR at 09:29

## 2019-04-12 NOTE — PROGRESS NOTES
Chief Complaint   Patient presents with    Knee Pain     Right Knee F/U, finished Euflexxa series on 2/28/19 with some relief. Mann Hayden returns today for follow-up of her bilateral knee pain R>L . she reports this is worse than when I saw her last.  The patient's pain level is a 8/10. She finished Euflexxa in February with little relief. The previous treatment was successful.     Past Medical History:   Diagnosis Date    Anxiety     Arthritis     Bipolar 2 disorder (HCC)     Chronic back pain     CKD     Constipation     Continuous urine leakage     COPD (chronic obstructive pulmonary disease) (HCC)     Depression     Dysphagia     GERD (gastroesophageal reflux disease)     History of cardiovascular stress test 10-    LEXISCAN    History of cardiovascular stress test 06/2014    lexiscan stress    History of cardiovascular stress test 05/22/2018    Lexiscan stress test    Hyperlipidemia     MDRO (multiple drug resistant organisms) resistance     mrsa 3-4 yrs ago with back surgery    Menopausal symptoms     atrophic vaginitis    Mitral valve prolapse     Nausea & vomiting     with anesthesia    Neurogenic bladder     Obstructive sleep apnea     uses cpap    Overactive bladder     Pulmonary embolism (HCC)     few yrs ago has vena cava filter    PVD (peripheral vascular disease) ([address]     Schizoaffective disorder ([address]     Seizure disorder ([address]     stares    Thyroid disease     x30 years    Type 2 diabetes mellitus, without long-term current use of insulin ([address] 7/16/2018     Past Surgical History:   Procedure Laterality Date    APPENDECTOMY      BACK SURGERY      lumbar fusion    BLADDER SURGERY      multiple   d/t  incontinence    BREAST SURGERY      bx neg lt    COLONOSCOPY      CYST REMOVAL  2006    vulvular sebaceous cyst excision    DIAGNOSTIC CARDIAC CATH LAB PROCEDURE  1996/1997    showed \"mitral valve prolapse\", records not available     ECHO COMPL W DOP COLOR FLOW  10/10/2012         ENDOSCOPY, COLON, DIAGNOSTIC      FINGER TRIGGER RELEASE Right 03/22/2019    FINGER TRIGGER RELEASE Right 3/22/2019    RIGHT HAND RING FINGER TRIGGER FINGER RELEASE performed by Mildred Yadav DO at [address]    HIP SURGERY Right      screws and rods     HYSTERECTOMY      KNEE ARTHROSCOPY  10-26-12    left    OTHER SURGICAL HISTORY      supra pubic tubing for 1 1/2 yrs then removed    OTHER SURGICAL HISTORY Bilateral JULY 11, 2013    BILAT MAXILLARY ANTROSTOMY,BILATERAL  INFERIOR TURBINATE CAUTERY AND LATERALIZATION,ENDOSCOPIC EXCISION BILATERAL MIDDLE TURBINATE ELVIS BULLOSA     OTHER SURGICAL HISTORY Left 7-22-15    left knee arthroscopy partial lateral menisectomy chrondroplasty partial synovectomy     ND ARTHDSIS POST/POSTERLATRL/POSTINTRBDYADL SPC/SEG N/A 7/16/2018    L3-4M L4-5, L5-S1 POSTERIOR LUMBAR INTERBODY FUSION --OARM, AUDIOLOGY, CAGES, PLATES, SCREWS, CRISS, FLORENTIN TABLE, CELL SAVER, PLATELET GEL, MEDTRONIC performed by Nader Stone MD at [facility]  [facility] ENDOSCOPY      VENA CAVA FILTER PLACEMENT         Current Outpatient Medications:     [START ON 4/14/2019] acetaminophen-codeine (TYLENOL/CODEINE #3) 300-30 MG per tablet, Take 1 tablet by mouth daily as needed for Pain for up to 30 days.  Do not fill until 4/14/2019., Disp: 30 tablet, Rfl: 0    vitamin D (ERGOCALCIFEROL) 73844 units CAPS capsule, Take 50,000 Units by mouth once a week, Disp: , Rfl:     nitroGLYCERIN (NITROSTAT) 0.4 MG SL tablet, Place 1 tablet under the tongue every 5 minutes as needed for Chest pain, Disp: 25 tablet, Rfl: 0    loperamide (IMODIUM) 2 MG capsule, Take 2 mg by mouth 4 times daily as needed for Diarrhea, Disp: , Rfl:     acetaminophen (TYLENOL 8 HOUR ARTHRITIS PAIN) 650 MG extended release tablet, Take 500 mg by mouth every 8 hours as needed for Pain , Disp: , Rfl:     aspirin 81 MG tablet, Take Not on file     Active member of club or organization: Not on file     Attends meetings of clubs or organizations: Not on file     Relationship status: Not on file    Intimate partner violence:     Fear of current or ex partner: Not on file     Emotionally abused: Not on file     Physically abused: Not on file     Forced sexual activity: Not on file   Other Topics Concern    Not on file   Social History Narrative    Not on file     Family History   Problem Relation Age of Onset    Colon Cancer Father     Cancer Father 77        colon cancer    Breast Cancer Mother     Cancer Mother         lung    Other Mother         mother passed away from low platelettes    Heart Attack Mother     Other Brother         hemiplegic from sepsis of spinal cord    Other Brother         MVA    Breast Cancer Maternal Aunt     Breast Cancer Maternal Aunt        Review of Systems:     Skin: (-) rash,(-) psoriasis,(-) eczema, (-)skin cancer. Musculoskeletal: (-) fractures,  (-) dislocations,(-) collagen vascular disease, (-) fibromyalgia, (-) multiple sclerosis, (-) muscular dystrophy, (-) RSD,(-) joint pain (-)swelling, (-) joint pain,swelling. Neurologic: (-) epilepsy, (-)seizures,(-) brain tumor,(-) TIA, (-)stroke, (-)headaches, (-)Parkinson disease,(-) memory loss, (-) LOC. Cardiovascular: (-) Chest pain, (-) swelling in legs/feet, (-) SOB, (-) cramping in legs/feet with walking. Subjective:    Constitutional:    The patient is alert and oriented x 3, appears to be stated age and in no distress. Ht. 5 ft 6 in., Wt. 230 lbs. Skin:    Upon inspection: the skin appears warm, dry and intact. There is not a previous scar over the affected area. There is not any cellulitis, lymphedema or cutaneous lesions noted in the lower extremities. Upon palpation there is no induration noted.           Neurologic:    Gait: normal;  Motor exam of the lower extremities show ; quadriceps, hamstrings, foot dorsi and plantar flexors intact R.  5/5 and L. 5/5. Deep tendon reflexes are 2/4 at the knees and 2/4 at the ankles with strong extensor hallicus longus motor strength bilaterally. Sensory to both feet is intact to all sensory roots. Cardiovascular: The vascular exam is normal and is well perfused to distal extremities. Distal pulses DP/PT: R. 2+; L. 2+. There is cap refill noted less than two seconds in all digits. There is not edema of the bilateral lower extremities. There is not varicosities noted in the distal extremities. Lymph:    Upon palpation,  there is no lymphadenopathy noted in bilateral lower extremities. Musculoskeletal:  Gait: antalgic; examination of the nails and digits reveal no cyanosis or clubbing    Lumbar exam:    On visual inspection, there is not deformity of the spine. full range of motion, no tenderness, palpable spasm or pain on motion. Special tests: Straight Leg Raise negative, Favian testnegative. Hip exam-     Upon inspection, there is not deformity noted. Upon palpation there is not tenderness. ROM: is   full and semetrical.   Strength: Hip Flexors 5/5; Hip Abductors 5/5; Hip Adduction 5/5. Knee exam    Bilateral knee exam shows; range of motion of R. Knee is 5 to 110, and L. Knee is 0 to 90. The patient does have pain on motion, effusion is none, there is tenderness over the anterior, patellar region, there are not any masses, there is not ligamentous instability, there is deformity noted. Knee exam: left positive for moderate crepitations, some mild tenderness laxity is not noted with stress.      R. Knee:  Lachman's negative, Anterior Drawer negative, Posterior Drawer negative  Kendrick's negative, Thallasy negative,   PF grind test negative, Apprehension test negative, Patellar J sign negative    L. Knee:  Lachman's negative, Anterior Drawer negative, Posterior Drawer negative  Kendrick's negative, Thallasy  negative,   PF grind test negative, Apprehension test negative,  Patellar J sign  negative      Xrays:   Mild tricompartmental degenerative changes are noted,   greatest in the medial compartments in bilateral knees.  There is no joint effusion. There is normal alignment. The patella is well located.  No fractures   are identified. Radiographic findings reviewed with patient      Impression:   Encounter Diagnosis   Name Primary?  Primary osteoarthritis of right knee Yes         Plan:   I had a lengthy discussion with the patient regarding their diagnosis. I explained treatment options including surgical vs non surgical treatment. I reviewed in detail the risks and benefits and outlined the procedure in detail with expected outcomes and possible complications. I also discussed non surgical treatment such as injections (CSI and visco supplementation), physical therapy, topical creams and NSAID's. They have elected for conservative management at this time. I will proceed with a cortisone injection in the bilateral knee. Verbal and written consent was obtained for the injections. The skin was prepped with alcohol. 1mL of Kenalog 40mg and 9mL of 0.25% Marcaine was  injected to bilateral knee. The injection was given through the lateral side of the knee. The patient tolerated the injection well.  I will see the patient back prn

## 2019-04-12 NOTE — PATIENT INSTRUCTIONS
Bilateral cortisone injections given, please monitor blood sugar over the next couple days.   FU PRN

## 2019-05-06 ENCOUNTER — OFFICE VISIT (OUTPATIENT)
Dept: PAIN MANAGEMENT | Age: 64
End: 2019-05-06
Payer: MEDICAID

## 2019-05-06 ENCOUNTER — PREP FOR PROCEDURE (OUTPATIENT)
Dept: PAIN MANAGEMENT | Age: 64
End: 2019-05-06

## 2019-05-06 VITALS
OXYGEN SATURATION: 98 % | HEART RATE: 80 BPM | SYSTOLIC BLOOD PRESSURE: 118 MMHG | TEMPERATURE: 98.5 F | DIASTOLIC BLOOD PRESSURE: 80 MMHG | WEIGHT: 230 LBS | BODY MASS INDEX: 36.96 KG/M2 | HEIGHT: 66 IN | RESPIRATION RATE: 16 BRPM

## 2019-05-06 DIAGNOSIS — M17.12 PRIMARY OSTEOARTHRITIS OF LEFT KNEE: ICD-10-CM

## 2019-05-06 DIAGNOSIS — G89.4 CHRONIC PAIN SYNDROME: ICD-10-CM

## 2019-05-06 DIAGNOSIS — M47.816 LUMBAR FACET ARTHROPATHY: ICD-10-CM

## 2019-05-06 DIAGNOSIS — M25.561 CHRONIC PAIN OF RIGHT KNEE: ICD-10-CM

## 2019-05-06 DIAGNOSIS — Z96.641 STATUS POST TOTAL HIP REPLACEMENT, RIGHT: ICD-10-CM

## 2019-05-06 DIAGNOSIS — M47.816 LUMBAR SPONDYLOSIS: ICD-10-CM

## 2019-05-06 DIAGNOSIS — G89.29 CHRONIC PAIN OF RIGHT KNEE: ICD-10-CM

## 2019-05-06 DIAGNOSIS — M54.16 LUMBAR RADICULOPATHY: ICD-10-CM

## 2019-05-06 DIAGNOSIS — M43.16 SPONDYLOLISTHESIS AT L3-L4 LEVEL: ICD-10-CM

## 2019-05-06 DIAGNOSIS — M16.0 PRIMARY OSTEOARTHRITIS OF BOTH HIPS: ICD-10-CM

## 2019-05-06 DIAGNOSIS — G89.29 CHRONIC PAIN OF LEFT KNEE: ICD-10-CM

## 2019-05-06 DIAGNOSIS — M16.11 PRIMARY OSTEOARTHRITIS OF RIGHT HIP: ICD-10-CM

## 2019-05-06 DIAGNOSIS — M25.562 CHRONIC PAIN OF LEFT KNEE: ICD-10-CM

## 2019-05-06 PROCEDURE — 99213 OFFICE O/P EST LOW 20 MIN: CPT | Performed by: NURSE PRACTITIONER

## 2019-05-06 RX ORDER — ACETAMINOPHEN AND CODEINE PHOSPHATE 300; 30 MG/1; MG/1
1 TABLET ORAL DAILY PRN
Qty: 15 TABLET | Refills: 0 | Status: SHIPPED | OUTPATIENT
Start: 2019-05-06 | End: 2019-09-03 | Stop reason: SDUPTHER

## 2019-05-06 NOTE — PROGRESS NOTES
[address]  [address] [facility], [address] [place] [address] [place]  [phone]    Follow up Note      Cece Batch     Date of Visit:  5/6/2019    CC:  Patient presents for follow up   Chief Complaint   Patient presents with    Chronic Pain     lower back       HPI:    Pain is worse. C/o Right sided radiculopathy L4 dermatome  Change in quality of symptoms:no. Medication side effects:none. Recent diagnostic testing:none. Recent interventional procedures:none. She has been on anticoagulation medications to include ASA. The patient  has not been on herbal supplements. The patient is Pre-diabetic. Imaging studies:  Results for orders placed during the hospital encounter of 05/11/18   MRI [facility]    Narrative Reading location: 100    Indication: Spondylolisthesis, pain. Comparison: MRI lumbar spine from 4/9/2018; lumbar spine radiograph  from 5/11/2018. Technique: Multiplanar, multisequence MR imaging of the lumbar spine  was performed without administration of intravenous contrast.    FINDINGS:    For purposes of this study, the last fully formed disc is considered  L5-S1 and corresponds to axial image 28 of series 7. There is normal lumbar lordosis. Vertebral bodies maintain normal  height. There is grade 1 anterolisthesis of L3 on L4. There are mild  multilevel degenerative endplate changes. No acute fracture is  identified. Redemonstrated are postsurgical changes from prior posterior  decompression from L3 to L5. There is no abnormal collection within  the laminectomy defects. Conus medullaris terminates at L1-L2 and is normal in caliber and  signal. There is normal distribution of the cauda equina nerve roots. There is multilevel intervertebral disc desiccation. T11-T12: Seen only on the sagittal sequences, there is a mild diffuse  disc bulge with suggestion of mild left foraminal narrowing.  No  central canal or right foraminal narrowing. T12-L1: Minimal disc bulge with superimposed shallow left paracentral  disc protrusion. No central canal or foraminal narrowing. L1-L2: Minimal disc bulge. Mild facet hypertrophy. No central canal  narrowing. Mild bilateral foraminal narrowing. L2-L3: Mild diffuse disc bulge. Moderate bilateral facet hypertrophy  with ligamentum flavum infolding. Mild to moderate bilateral foraminal  narrowing. No central canal narrowing. L3-L4: Grade 1 anterolisthesis with uncovering of the disc and  superimposed diffuse disc bulge. Postsurgical changes from prior  posterior decompression. Bilateral facet hypertrophy. No central canal  narrowing. Moderate bilateral foraminal narrowing. L4-L5: Diffuse disc bulge. Postsurgical changes from prior posterior  decompression. No central canal narrowing. Moderate bilateral  foraminal narrowing. L5-S1: Diffuse disc bulge asymmetric to the left. Postsurgical changes  from prior posterior decompression. Moderate bilateral facet  hypertrophy. No central canal narrowing. Mild right and moderate left  foraminal narrowing. There is susceptibility artifact from a right hip arthroplasty. Impression 1. Postsurgical changes from prior posterior decompression from L3-L4  to L5-S1.  2. Multilevel lumbar spondylosis as described above with moderate  bilateral foraminal narrowing at L3-L4 and L4-L5 and moderate left  foraminal narrowing at L5-S1.  3. Mild to moderate bilateral foraminal narrowing at L2-L3. Bilateral hip XR 2018  Right hip arthroplasty noted without loosening or fracture. Left hip   appears normal. The bony pelvis is intact.      Bilateral knee XR 2017  AP, lateral and tangent views of the  left knee obtained.  Bony   alignment intact.  No marked joint space narrowing, fracture or joint   effusion.  AP standing views right knee without focal abnormality.         Potential Aberrant Drug-Related Behavior: None    Urine Drug Screenin/15/2018 consistent for lorazepam  2/2019 Consistent for codeine    OARRS report:  12/2018 consistent  01/2019 consistent  02/2019 consistent  03/2019 consistent  05/2019 Consistent     Controlled Substances Monitoring:     RX Monitoring 5/6/2019   Attestation The Prescription Monitoring Report for this patient was reviewed today. Chronic Pain Routine Monitoring Possible medication side effects, risk of tolerance/dependence & alternative treatments discussed. ;No signs of potential drug abuse or diversion identified: otherwise, see note documentation   Chronic Pain > 80 MEDD -       Past Medical History:   Diagnosis Date    Anxiety     Arthritis     Bipolar 2 disorder ([address]     Chronic back pain     CKD     Constipation     Continuous urine leakage     COPD (chronic obstructive pulmonary disease) ([facility])     Depression     Dysphagia     GERD (gastroesophageal reflux disease)     History of cardiovascular stress test 10-    LEXISCAN    History of cardiovascular stress test 06/2014    lexiscan stress    History of cardiovascular stress test 05/22/2018    Lexiscan stress test    Hyperlipidemia     MDRO (multiple drug resistant organisms) resistance     mrsa 3-4 yrs ago with back surgery    Menopausal symptoms     atrophic vaginitis    Mitral valve prolapse     Nausea & vomiting     with anesthesia    Neurogenic bladder     Obstructive sleep apnea     uses cpap    Overactive bladder     Pulmonary embolism ([facility])     few yrs ago has vena cava filter    PVD (peripheral vascular disease) ([facility])     Schizoaffective disorder ([address]     Seizure disorder ([address]     stares    Thyroid disease     x30 years    Type 2 diabetes mellitus, without long-term current use of insulin ([address] 7/16/2018       Past Surgical History:   Procedure Laterality Date    APPENDECTOMY      BACK SURGERY      lumbar fusion    BLADDER SURGERY      multiple   d/t  incontinence    BREAST SURGERY      bx neg lt    COLONOSCOPY      CYST REMOVAL  2006    vulvular sebaceous cyst excision    DIAGNOSTIC CARDIAC CATH LAB PROCEDURE  1996/1997    showed \"mitral valve prolapse\", records not available     ECHO COMPL W DOP COLOR FLOW  10/10/2012         ENDOSCOPY, COLON, DIAGNOSTIC      FINGER TRIGGER RELEASE Right 03/22/2019    FINGER TRIGGER RELEASE Right 3/22/2019    RIGHT HAND RING FINGER TRIGGER FINGER RELEASE performed by Bobby Jerome DO at [address] [place] [address]      lt    HIP SURGERY Right      screws and rods     HYSTERECTOMY      KNEE ARTHROSCOPY  10-26-12    left    OTHER SURGICAL HISTORY      supra pubic tubing for 1 1/2 yrs then removed    OTHER SURGICAL HISTORY Bilateral JULY 11, 2013    BILAT MAXILLARY ANTROSTOMY,BILATERAL  INFERIOR TURBINATE CAUTERY AND LATERALIZATION,ENDOSCOPIC EXCISION BILATERAL MIDDLE TURBINATE ELVIS BULLOSA     OTHER SURGICAL HISTORY Left 7-22-15    left knee arthroscopy partial lateral menisectomy chrondroplasty partial synovectomy     MN ARTHDSIS POST/POSTERLATRL/POSTINTRBDYADL SPC/SEG N/A 7/16/2018    L3-4M L4-5, L5-S1 POSTERIOR LUMBAR INTERBODY FUSION --OARM, AUDIOLOGY, CAGES, PLATES, SCREWS, CRISS, FLORENTIN TABLE, CELL SAVER, PLATELET GEL, MEDTRONIC performed by Ronald Urbina MD at [address] [place], [place] [address]e         Prior to Admission medications    Medication Sig Start Date End Date Taking? Authorizing Provider   acetaminophen-codeine (TYLENOL/CODEINE #3) 300-30 MG per tablet Take 1 tablet by mouth daily as needed for Pain for up to 30 days.  Do not fill until 4/14/2019. 4/14/19 5/14/19 Yes ANSELMO Carmona   vitamin D (ERGOCALCIFEROL) 97902 units CAPS capsule Take 50,000 Units by mouth once a week   Yes Historical Provider, MD   nitroGLYCERIN (NITROSTAT) 0.4 MG SL tablet Place 1 tablet under the tongue every 5 minutes as needed for Chest pain 12/3/18  Yes JAM Bah - CNP   loperamide (IMODIUM) 2 MG capsule Take 2 mg by mouth 4 times daily as needed for Diarrhea   Yes Historical Provider, MD   acetaminophen (TYLENOL 8 HOUR ARTHRITIS PAIN) 650 MG extended release tablet Take 500 mg by mouth every 8 hours as needed for Pain    Yes Historical Provider, MD   aspirin 81 MG tablet Take 81 mg by mouth daily Stopped 1 week pre op   Yes Historical Provider, MD   pantoprazole sodium (PROTONIX) 40 MG PACK packet Take 40 mg by mouth every morning (before breakfast)   Yes Historical Provider, MD   calcium carbonate (TUMS) 500 MG chewable tablet Take 2 tablets by mouth 4 times daily as needed for Heartburn   Yes Historical Provider, MD   fluPHENAZine HCl (PROLIXIN) 10 MG tablet Take 10 mg by mouth 2 times daily TAKES AN EXTRA 1/2 TAB AT BEDTIME   Yes Historical Provider, MD   Crisaborole 2 % OINT Apply 1 drop topically daily 6/20/18  Yes Dayron Kirk,    ARIPiprazole (ABILIFY) 30 MG tablet Take 30 mg by mouth daily   Yes Historical Provider, MD   metFORMIN (GLUCOPHAGE) 500 MG tablet Take 500 mg by mouth daily (with breakfast)   Yes Historical Provider, MD   albuterol sulfate  (90 Base) MCG/ACT inhaler Inhale 2 puffs into the lungs every 6 hours as needed for Wheezing or Shortness of Breath UP TO 4 TIMES A DAY   Yes Historical Provider, MD   sucralfate (CARAFATE) 1 GM tablet Take 1 g by mouth 4 times daily   Yes Historical Provider, MD   albuterol (PROVENTIL) (2.5 MG/3ML) 0.083% nebulizer solution Take 2.5 mg by nebulization 2 times daily UP TO 4 TIMES PER DAY AS NEEDED FOR COUGH, WHEEZING OR SHORTNESS OF BREATH   Yes Historical Provider, MD   LORazepam (ATIVAN) 0.5 MG tablet Take 0.5 mg by mouth 2 times daily. PRN.    Yes Historical Provider, MD   melatonin 3 MG TABS tablet Take 3 mg by mouth nightly    Yes Historical Provider, MD   oxybutynin (DITROPAN) 5 MG tablet Take 5 mg by mouth 3 times daily   Yes Historical Provider, MD   venlafaxine (EFFEXOR XR) 150 MG extended release capsule Take 225 mg by mouth daily 10/26/16  Yes Historical Provider, MD   bisacodyl (DULCOLAX) 10 MG suppository Place 10 mg rectally daily as needed for Constipation IF NO MILK OF MAG RESULTS   Yes Historical Provider, MD   polyvinyl alcohol (ARTIFICIAL TEARS) 1.4 % ophthalmic solution Place 1 drop into both eyes as needed    Yes Historical Provider, MD   gabapentin (NEURONTIN) 100 MG capsule Take 100 mg by mouth 3 times daily    Yes Historical Provider, MD   benztropine (COGENTIN) 0.5 MG tablet Take 0.5 mg by mouth 3 times daily    Yes Historical Provider, MD   levothyroxine (SYNTHROID) 25 MCG tablet TAKE 1 TABLET BY MOUTH DAILY. 7/30/15  Yes Cecily Lowery,    topiramate (TOPAMAX) 100 MG tablet TAKE 1 TABLET BY MOUTH 2 TIMES DAILY. Patient taking differently: BID 6/23/15  Yes Delano Garay, DO   docusate sodium (DOCQLACE) 100 MG capsule TAKE 1 CAPSULE BY MOUTH DAILY. Patient taking differently: Take 100 mg by mouth daily TAKE 1 CAPSULE BY MOUTH DAILY. 5/22/15  Yes Vickey Smith, DO   ranitidine (ZANTAC) 150 MG tablet Take 300 mg by mouth nightly     Historical Provider, MD       Allergies   Allergen Reactions    Penicillins Anaphylaxis    Demerol Other (See Comments)     hypotension    Depakote [Divalproex Sodium]      ?     Haldol [Haloperidol Lactate] Other (See Comments)     shakes    Sulfa Antibiotics Nausea And Vomiting       Social History     Socioeconomic History    Marital status:      Spouse name: Not on file    Number of children: Not on file    Years of education: Not on file    Highest education level: Not on file   Occupational History    Not on file   Social Needs    Financial resource strain: Not on file    Food insecurity:     Worry: Not on file     Inability: Not on file    Transportation needs:     Medical: Not on file     Non-medical: Not on file   Tobacco Use    Smoking status: Former Smoker     Years: 25.00     Types: Cigarettes     Last attempt to quit: 1/1/2002     Years since quittin.3    Smokeless tobacco: Never Used   Substance and Sexual Activity    Alcohol use: No     Comment: quit 2002. 4 cups coffee per day, 2 glasses tea daily    Drug use: No     Comment: quit in  all the other drugs except marijuana that was 1986    Sexual activity: Never   Lifestyle    Physical activity:     Days per week: Not on file     Minutes per session: Not on file    Stress: Not on file   Relationships    Social connections:     Talks on phone: Not on file     Gets together: Not on file     Attends [employer] service: Not on file     Active member of club or organization: Not on file     Attends meetings of clubs or organizations: Not on file     Relationship status: Not on file    Intimate partner violence:     Fear of current or ex partner: Not on file     Emotionally abused: Not on file     Physically abused: Not on file     Forced sexual activity: Not on file   Other Topics Concern    Not on file   Social History Narrative    Not on file       Family History   Problem Relation Age of Onset    Colon Cancer Father     Cancer Father 77        colon cancer    Breast Cancer Mother     Cancer Mother         lung    Other Mother         mother passed away from low platelettes    Heart Attack Mother     Other Brother         hemiplegic from sepsis of spinal cord    Other Brother         MVA    Breast Cancer Maternal Aunt     Breast Cancer Maternal Aunt        REVIEW OF SYSTEMS:     Merdis Majestic denies fever/chills, chest pain, shortness of breath, new bowel or bladder complaints. All other review of systems was negative. Denies any new neurologic complaints and/or red flag symptoms. PHYSICAL EXAMINATION:      /80   Pulse 80   Temp 98.5 °F (36.9 °C) (Oral)   Resp 16   Ht 5' 6\" (1.676 m)   Wt 230 lb (104.3 kg)   SpO2 98%   BMI 37.12 kg/m²     General:      General appearance: Pleasant and well-hydrated.  In moderate discomfort and alert and oriented x3  Build:Overweight  Function:Rises from a seated position with difficulty     HEENT:     Head:normocephalic and atraumatic  Pupils:regular, round and equal.  Sclera: icterus absent,     Abdomen:     Shape: nobese and non-distended  Tenderness:none  Guarding:none     Lumbar spine:     Spine inspection: Surgical incisional scar and Kyphosis in her thoracic spine  CVA tenderness: None  Palpation: Tenderness paravertebral muscles, facet loading, left, right, positive and tenderness. Range of motion: Abnormal moderately Lateral bending, flexion, extension rotation bilateral and is painful.      Musculoskeletal:     Trigger points in Paravertebral: Present bilaterally  SI joint tenderness: Positive Right  Favian test: Positive Right  Piriformis tenderness: Negative  Trochanteric bursa tenderness: Negative  SLR: Negative, sitting     Extremities:     Tremors: None bilaterally upper and lower  Range of motion: Generally normal shoulders, pain with internal rotation of hips positive (mildly limited)  Intact: Yes  Edema: +2 pitting edema pretibial bilaterally     Neurological:     Sensory: Diminished to light touch bilateral lower extremities  Motor:  Right Quadriceps5/5  Left Quadriceps5/5  Right Gastrocnemius5/5  Left Gastrocnemius5/5  Right Ant Tibialis5/5  Left Ant Tibialis5/5  Reflexes:  Right Quadriceps reflex1+  Left Quadriceps reflex2+  Right Achilles reflex0  Left Achilles reflex0  Gait: Antalgic with walker     Dermatology:     Skin: Scars Left forearm    Assessment/Plan:  Patient seen for chronic low back pain s/p L3-S1 posterior fusion with h/o comorbid psychiatric disorders and seizure disorder, c/o increased Right LE radiculopathy  Will schedule Right TFESI at L4 x 1 and evaluate her response   Continue with  for her knee  Continue Tylenol #3 but will decrease to #15, using only PRN and would like to stop taking by next OV  Continue Gabapentin/Effexor and Topamax  Continue with OTC pain medications for mild to moderate pain  OARRS report reviewed  Patient encouraged to stay active as tolerated  Treatment plan discussed with the patient including medications and procedure side effects     ccreferring physic    Yessica Arambula, JAM - CNP

## 2019-05-13 ENCOUNTER — OFFICE VISIT (OUTPATIENT)
Dept: ORTHOPEDIC SURGERY | Age: 64
End: 2019-05-13

## 2019-05-13 VITALS — BODY MASS INDEX: 36.96 KG/M2 | WEIGHT: 230 LBS | HEIGHT: 66 IN

## 2019-05-13 DIAGNOSIS — M65.341 TRIGGER FINGER, RIGHT RING FINGER: Primary | ICD-10-CM

## 2019-05-13 PROCEDURE — 99024 POSTOP FOLLOW-UP VISIT: CPT | Performed by: NURSE PRACTITIONER

## 2019-05-13 NOTE — PROGRESS NOTES
Bren Short is here for a post op visit for Her trigger finger release right ring. The patient is doing well, however she continues to have post operative pain. Exam    The wound is clean, dry, no drainage. Range of motion: range of motion limited. The swelling is diminished. Encounter Diagnosis   Name Primary?     Trigger finger, right ring finger Yes       OT  The patient will begin a home exercise program.    Patient does not wish to be on any prescription dose for pain medication  We will see them back PRN

## 2019-05-14 ENCOUNTER — TELEPHONE (OUTPATIENT)
Dept: PAIN MANAGEMENT | Age: 64
End: 2019-05-14

## 2019-05-14 NOTE — TELEPHONE ENCOUNTER
Patient called nurse line. States that she needs more information about the procedure that she is scheduled to have on Monday. for Right TFESI L4-5 with Dr. Yael Arita    I left her a message with a brief explanation of the procedure and asked if she has further question to call back.   Calista Talavera

## 2019-05-14 NOTE — TELEPHONE ENCOUNTER
5-15-19-received call from Nimco Castillo, nurse  for Tasha Nichols from [facility]. Advised her that Tasha Nichols needs to hold her aspirin starting today and to not take it before her 5-20-19 procedure. Order obtained from Dr Madie Wang. Hold aspirin 5 days before her procedure. Order faxed to Nimco Castillo at [phone] at [facility].       Susan Kirk RN  Pain Management

## 2019-05-14 NOTE — TELEPHONE ENCOUNTER
5-14-19-upon chart review it is noted that Rocio Griffin takes aspirin. Call to her her last dose of aspirin was 5-14-19. Spoke to Dr Rodo Pace. OK to continue with the procedure. She shows an understanding to not take it before her procedure. Rocio Griffin lives in [facility], called and left message on their voice mail to call back.     Lee Cruz RN  Pain Management

## 2019-05-20 ENCOUNTER — HOSPITAL ENCOUNTER (OUTPATIENT)
Age: 64
Setting detail: OUTPATIENT SURGERY
Discharge: HOME | End: 2019-05-20
Attending: PAIN MEDICINE | Admitting: PAIN MEDICINE
Payer: MEDICAID

## 2019-05-20 ENCOUNTER — HOSPITAL ENCOUNTER (OUTPATIENT)
Dept: OPERATING ROOM | Age: 64
Discharge: HOME | End: 2019-05-20
Payer: MEDICAID

## 2019-05-20 VITALS
HEART RATE: 71 BPM | DIASTOLIC BLOOD PRESSURE: 85 MMHG | OXYGEN SATURATION: 98 % | RESPIRATION RATE: 16 BRPM | SYSTOLIC BLOOD PRESSURE: 150 MMHG

## 2019-05-20 DIAGNOSIS — M51.36 DDD (DEGENERATIVE DISC DISEASE), LUMBAR: ICD-10-CM

## 2019-05-20 PROCEDURE — 2500000003 HC RX 250 WO HCPCS: Performed by: PAIN MEDICINE

## 2019-05-20 PROCEDURE — 7100000011 HC PHASE II RECOVERY - ADDTL 15 MIN: Performed by: PAIN MEDICINE

## 2019-05-20 PROCEDURE — 64484 NJX AA&/STRD TFRM EPI L/S EA: CPT | Performed by: PAIN MEDICINE

## 2019-05-20 PROCEDURE — 3600000005 HC SURGERY LEVEL 5 BASE: Performed by: PAIN MEDICINE

## 2019-05-20 PROCEDURE — 3209999900 FLUORO FOR SURGICAL PROCEDURES

## 2019-05-20 PROCEDURE — 64483 NJX AA&/STRD TFRM EPI L/S 1: CPT | Performed by: PAIN MEDICINE

## 2019-05-20 PROCEDURE — 2709999900 HC NON-CHARGEABLE SUPPLY: Performed by: PAIN MEDICINE

## 2019-05-20 PROCEDURE — 7100000010 HC PHASE II RECOVERY - FIRST 15 MIN: Performed by: PAIN MEDICINE

## 2019-05-20 PROCEDURE — 3600000015 HC SURGERY LEVEL 5 ADDTL 15MIN: Performed by: PAIN MEDICINE

## 2019-05-20 PROCEDURE — 6360000004 HC RX CONTRAST MEDICATION: Performed by: PAIN MEDICINE

## 2019-05-20 PROCEDURE — 6360000002 HC RX W HCPCS: Performed by: PAIN MEDICINE

## 2019-05-20 RX ORDER — LIDOCAINE HYDROCHLORIDE 5 MG/ML
INJECTION, SOLUTION INFILTRATION; INTRAVENOUS PRN
Status: DISCONTINUED | OUTPATIENT
Start: 2019-05-20 | End: 2019-05-20 | Stop reason: ALTCHOICE

## 2019-05-20 ASSESSMENT — PAIN SCALES - GENERAL
PAINLEVEL_OUTOF10: 0
PAINLEVEL_OUTOF10: 0

## 2019-05-20 ASSESSMENT — PAIN DESCRIPTION - DESCRIPTORS: DESCRIPTORS: DISCOMFORT

## 2019-05-20 ASSESSMENT — PAIN - FUNCTIONAL ASSESSMENT: PAIN_FUNCTIONAL_ASSESSMENT: 0-10

## 2019-05-20 NOTE — OP NOTE
2019    Patient: Nate Teixeira  :  1955  Age:  62 y.o. Sex:  female     PRE-OPERATIVE DIAGNOSIS: Lumbar disc displacement, lumbar neural foraminal stenosis, lumbar radiculopathy. POST-OPERATIVE DIAGNOSIS: Same. PROCEDURE: Right Transforaminal epidural steroid injection under fluoroscopic guidance at foraminal level L4 and S1(attempted L5) (#1). SURGEON: TANYA Umana M.D. ANESTHESIA: Local    ESTIMATED BLOOD LOSS: None.  ______________________________________________________________________  BRIEF HISTORY: Nate Teixeira comes in today for the first Right transforaminal epidural steroid injection under fluoroscopic guidance at foraminal level L4 and S1 TFESI . The potential complications of this procedure were discussed with her again today. She has elected to undergo the aforementioned procedure. Elvin complete History & Physical examination were reviewed in depth, a copy of which is in the chart. DESCRIPTION OF PROCEDURE:    After confirming written and informed consent, a time-out was performed and Elvin name and date of birth, the procedure to be performed as well as the plan for the location of the needle insertion were confirmed. The patient was brought into the procedure room and placed in the prone position on the fluoroscopy table. Standard monitors were placed and vital signs were observed throughout the procedure. The area of the lumbar spine was prepped with chloraprep and draped in a sterile manner. The vertebral body was identified with AP fluoroscopy. An oblique view was obtained to better visualize the inferior junction of the pedicle and transverse process . The 6 o'clock position of the pedicle was marked and identified. The skin and subcutaneous tissue were anesthetized with 0.5% lidocaine. A # 22 gauge pencil point needle was directed toward the targeted point under fluoroscopy until bone was contacted.  The needle was then walked inferiorly until the neural foramen was entered . A lateral fluoroscopic view was then used to place the needle tip in the middle of the foramen. Negative aspiration was confirmed for blood and CSF and 0.5 cc of Omnipaque 240 contrast was injected at each level under live fluoroscopy. Appropriate neurograms were observed under AP fluoroscopy. Then after negative aspiration, a solution of the 2 cc of 0.5% lidocaine and 40 mg DepoMedrol was easily injected at each level. The needles were removed with constant aspiration technique. The patient back was cleaned and a bandage was placed over the needle insertion points    Disposition the patient tolerated the procedure well and there were no complications . Vital signs remained stable throughout the procedure. The patient was escorted to the recovery area where they remained until discharge and written discharge instructions for the procedure were given. Plan: Mary Samayoa will return to our pain management center as scheduled.      Khadra Armando MD

## 2019-05-20 NOTE — H&P
[address]  [address] [facility], [address] [place] [address]  [phone]    Procedure History & Physical      Corrinne Gearing     HPI:    Patient  is here for low back and right leg pain for Right L4 and L5 TFESI  Labs/imaging studies reviewed   All question and concerns addressed including R/B/A associated with the procedure    Past Medical History:   Diagnosis Date    Anxiety     Arthritis     Bipolar 2 disorder ([address]     Chronic back pain     CKD     Constipation     Continuous urine leakage     COPD (chronic obstructive pulmonary disease) ([address]     Depression     Dysphagia     GERD (gastroesophageal reflux disease)     History of cardiovascular stress test 10-    LEXISCAN    History of cardiovascular stress test 06/2014    lexiscan stress    History of cardiovascular stress test 05/22/2018    Lexiscan stress test    Hyperlipidemia     MDRO (multiple drug resistant organisms) resistance     mrsa 3-4 yrs ago with back surgery    Menopausal symptoms     atrophic vaginitis    Mitral valve prolapse     Nausea & vomiting     with anesthesia    Neurogenic bladder     Obstructive sleep apnea     uses cpap    Overactive bladder     Pulmonary embolism (HCC)     few yrs ago has vena cava filter    PVD (peripheral vascular disease) ([address]     Schizoaffective disorder ([address]     Seizure disorder (HCC)     stares    Thyroid disease     x30 years    Type 2 diabetes mellitus, without long-term current use of insulin ([address] 7/16/2018       Past Surgical History:   Procedure Laterality Date    APPENDECTOMY      BACK SURGERY      lumbar fusion    BLADDER SURGERY      multiple   d/t  incontinence    BREAST SURGERY      bx neg lt    COLONOSCOPY      CYST REMOVAL  2006    vulvular sebaceous cyst excision    DIAGNOSTIC CARDIAC CATH LAB PROCEDURE  1996/1997    showed \"mitral valve prolapse\", records not available     ECHO COMPL W DOP COLOR FLOW  10/10/2012         ENDOSCOPY, COLON, DIAGNOSTIC      FINGER TRIGGER RELEASE Right 03/22/2019    FINGER TRIGGER RELEASE Right 3/22/2019    RIGHT HAND RING FINGER TRIGGER FINGER RELEASE performed by Samra Castillo DO at [address] [place] [address]    HIP SURGERY Right      screws and rods     HYSTERECTOMY      KNEE ARTHROSCOPY  10-26-12    left    OTHER SURGICAL HISTORY      supra pubic tubing for 1 1/2 yrs then removed    OTHER SURGICAL HISTORY Bilateral JULY 11, 2013    BILAT MAXILLARY ANTROSTOMY,BILATERAL  INFERIOR TURBINATE CAUTERY AND LATERALIZATION,ENDOSCOPIC EXCISION BILATERAL MIDDLE TURBINATE ELVIS BULLOSA     OTHER SURGICAL HISTORY Left 7-22-15    left knee arthroscopy partial lateral menisectomy chrondroplasty partial synovectomy     [place] ARTHDSIS POST/POSTERLATRL/POSTINTRBDYADL SPC/SEG N/A 7/16/2018    L3-4M L4-5, L5-S1 POSTERIOR LUMBAR INTERBODY FUSION --OARM, AUDIOLOGY, CAGES, PLATES, SCREWS, CRISS, FLORENTIN TABLE, CELL SAVER, PLATELET GEL, MEDTRONIC performed by Lisset Palacio MD at [address]         Prior to Admission medications    Medication Sig Start Date End Date Taking? Authorizing Provider   acetaminophen-codeine (TYLENOL/CODEINE #3) 300-30 MG per tablet Take 1 tablet by mouth daily as needed for Pain for up to 30 days.  5/6/19 6/5/19 Yes JAM Vasquez CNP   vitamin D (ERGOCALCIFEROL) 24577 units CAPS capsule Take 50,000 Units by mouth once a week   Yes Historical Provider, MD   nitroGLYCERIN (NITROSTAT) 0.4 MG SL tablet Place 1 tablet under the tongue every 5 minutes as needed for Chest pain 12/3/18  Yes JAM Healy CNP   loperamide (IMODIUM) 2 MG capsule Take 2 mg by mouth 4 times daily as needed for Diarrhea   Yes Historical Provider, MD   aspirin 81 MG tablet Take 81 mg by mouth daily Stopped 1 week pre op   Yes Historical Provider, MD   pantoprazole sodium (PROTONIX) 40 MG PACK packet Take 40 mg by mouth every morning (before breakfast)   Yes Historical Provider, MD   calcium carbonate (TUMS) 500 MG chewable tablet Take 2 tablets by mouth 4 times daily as needed for Heartburn   Yes Historical Provider, MD   fluPHENAZine HCl (PROLIXIN) 10 MG tablet Take 10 mg by mouth 2 times daily TAKES AN EXTRA 1/2 TAB AT BEDTIME   Yes Historical Provider, MD   Crisaborole 2 % OINT Apply 1 drop topically daily 6/20/18  Yes Dayron Kirk,    ARIPiprazole (ABILIFY) 30 MG tablet Take 30 mg by mouth daily   Yes Historical Provider, MD   metFORMIN (GLUCOPHAGE) 500 MG tablet Take 500 mg by mouth daily (with breakfast)   Yes Historical Provider, MD   albuterol sulfate  (90 Base) MCG/ACT inhaler Inhale 2 puffs into the lungs every 6 hours as needed for Wheezing or Shortness of Breath UP TO 4 TIMES A DAY   Yes Historical Provider, MD   sucralfate (CARAFATE) 1 GM tablet Take 1 g by mouth 4 times daily   Yes Historical Provider, MD   albuterol (PROVENTIL) (2.5 MG/3ML) 0.083% nebulizer solution Take 2.5 mg by nebulization 2 times daily UP TO 4 TIMES PER DAY AS NEEDED FOR COUGH, WHEEZING OR SHORTNESS OF BREATH   Yes Historical Provider, MD   LORazepam (ATIVAN) 0.5 MG tablet Take 0.5 mg by mouth 2 times daily.     Yes Historical Provider, MD   melatonin 3 MG TABS tablet Take 6 mg by mouth nightly    Yes Historical Provider, MD   oxybutynin (DITROPAN) 5 MG tablet Take 5 mg by mouth 3 times daily   Yes Historical Provider, MD   venlafaxine (EFFEXOR XR) 150 MG extended release capsule Take 225 mg by mouth daily  10/26/16  Yes Historical Provider, MD   polyvinyl alcohol (ARTIFICIAL TEARS) 1.4 % ophthalmic solution Place 1 drop into both eyes 4 times daily    Yes Historical Provider, MD   gabapentin (NEURONTIN) 100 MG capsule Take 100 mg by mouth 3 times daily    Yes Historical Provider, MD   benztropine (COGENTIN) 0.5 MG tablet Take 0.5 mg by mouth 3 times daily    Yes Historical Provider, MD   levothyroxine (SYNTHROID) 25 MCG tablet TAKE 1 TABLET BY MOUTH DAILY. 7/30/15  Yes Cecily Lowery, DO   topiramate (TOPAMAX) 100 MG tablet TAKE 1 TABLET BY MOUTH 2 TIMES DAILY. Patient taking differently: BID 6/23/15  Yes Delano Garay, DO   docusate sodium (DOCQLACE) 100 MG capsule TAKE 1 CAPSULE BY MOUTH DAILY. Patient taking differently: Take 100 mg by mouth daily TAKE 1 CAPSULE BY MOUTH DAILY. 5/22/15  Yes Lynn Hinton, DO   ranitidine (ZANTAC) 150 MG tablet Take 300 mg by mouth nightly    Yes Historical Provider, MD   acetaminophen (TYLENOL 8 HOUR ARTHRITIS PAIN) 650 MG extended release tablet Take 500 mg by mouth every 8 hours as needed for Pain     Historical Provider, MD       Allergies   Allergen Reactions    Penicillins Anaphylaxis    Demerol Other (See Comments)     hypotension    Depakote [Divalproex Sodium]      ?     Haldol [Haloperidol Lactate] Other (See Comments)     shakes    Sulfa Antibiotics Nausea And Vomiting       Social History     Socioeconomic History    Marital status:      Spouse name: Not on file    Number of children: Not on file    Years of education: Not on file    Highest education level: Not on file   Occupational History    Not on file   Social Needs    Financial resource strain: Not on file    Food insecurity:     Worry: Not on file     Inability: Not on file    Transportation needs:     Medical: Not on file     Non-medical: Not on file   Tobacco Use    Smoking status: Former Smoker     Years: 25.00     Types: Cigarettes     Last attempt to quit: 2002     Years since quittin.3    Smokeless tobacco: Never Used   Substance and Sexual Activity    Alcohol use: No     Comment: quit . 4 cups coffee per day, 2 glasses tea daily    Drug use: No     Comment: quit in  all the other drugs except marijuana that was 13    Sexual activity: Never   Lifestyle    Physical activity:     Days per week: Not on file     Minutes per session: Not on file    Stress: Not on file   Relationships    Social connections:     Talks on phone: Not on file     Gets together: Not on file     Attends Advent service: Not on file     Active member of club or organization: Not on file     Attends meetings of clubs or organizations: Not on file     Relationship status: Not on file    Intimate partner violence:     Fear of current or ex partner: Not on file     Emotionally abused: Not on file     Physically abused: Not on file     Forced sexual activity: Not on file   Other Topics Concern    Not on file   Social History Narrative    Not on file       Family History   Problem Relation Age of Onset    Colon Cancer Father     Cancer Father 77        colon cancer    Breast Cancer Mother     Cancer Mother         lung    Other Mother         mother passed away from low platelettes    Heart Attack Mother     Other Brother         hemiplegic from sepsis of spinal cord    Other Brother         MVA    Breast Cancer Maternal Aunt     Breast Cancer Maternal Aunt          REVIEW OF SYSTEMS:    CONSTITUTIONAL:  negative for  fevers, chills, sweats and fatigue    RESPIRATORY:  negative for  dry cough, cough with sputum, dyspnea, wheezing and chest pain    CARDIOVASCULAR:  negative for chest pain, dyspnea, palpitations, syncope    GASTROINTESTINAL:  negative for nausea, vomiting, change in bowel habits, diarrhea, constipation and abdominal pain    MUSCULOSKELETAL: negative for muscle weakness    SKIN: negative for itching or rashes.     BEHAVIOR/PSYCH:  negative for poor appetite, increased appetite, decreased sleep and poor concentration    All other systems negative      PHYSICAL EXAM:    VITALS:  BP (!) 161/87   Pulse 67   Resp 20   SpO2 98%     CONSTITUTIONAL:  awake, alert, cooperative, no apparent distress, and appears stated age    EYES: PERRLA, EOMI    LUNGS:  No increased work of breathing, no audible wheezing    CARDIOVASCULAR:  regular rate and rhythm    ABDOMEN:  Soft non tender non distended     EXTREMITIES: no signs of clubbing or cyanosis. MUSCULOSKELETAL: negative for flaccid muscle tone or spastic movements. SKIN: gross examination reveals no signs of rashes, or diaphoresis. NEURO: Cranial nerves II-XII grossly intact. No signs of agitated mood.        Assessment/Plan:    Low back and right leg  pain for right L4 and L5 TFESI

## 2019-06-04 ENCOUNTER — OFFICE VISIT (OUTPATIENT)
Dept: PAIN MANAGEMENT | Age: 64
End: 2019-06-04
Payer: MEDICAID

## 2019-06-04 VITALS
DIASTOLIC BLOOD PRESSURE: 68 MMHG | RESPIRATION RATE: 16 BRPM | WEIGHT: 230 LBS | BODY MASS INDEX: 36.96 KG/M2 | TEMPERATURE: 98.7 F | HEART RATE: 54 BPM | HEIGHT: 66 IN | OXYGEN SATURATION: 99 % | SYSTOLIC BLOOD PRESSURE: 118 MMHG

## 2019-06-04 DIAGNOSIS — G89.4 CHRONIC PAIN SYNDROME: ICD-10-CM

## 2019-06-04 PROCEDURE — 99213 OFFICE O/P EST LOW 20 MIN: CPT | Performed by: NURSE PRACTITIONER

## 2019-06-04 NOTE — PROGRESS NOTES
Malu Dewitt presents to the [address] on 6/4/2019. Robbin Day is complaining of pain center to lower back right leg,right hand. The pain is constant for back, right hand pain is constant,right leg pain off and on The pain is described as stabbing and burning. Pain is rated on her best day at a 3, on her worst day at a 9, and on average at a 5 on the VAS scale. She took her last dose of Neurontin not sure when she took it. Any procedures since your last visit: Yes, with 50 % relief. She has been on anticoagulation medications to include ASA and is managed by Leila Montilla      Pacemaker or defibrilator: No Physician managing device is .        /68 (Site: Left Upper Arm, Position: Sitting, Cuff Size: Medium Adult)   Pulse 54   Temp 98.7 °F (37.1 °C) (Oral)   Resp 16   Ht 5' 6\" (1.676 m)   Wt 230 lb (104.3 kg)   SpO2 99%   BMI 37.12 kg/m²

## 2019-06-04 NOTE — PROGRESS NOTES
[address] [facility], [address] [place] [address] [place]  [phone]    Follow up Note      Marcial Perez     Date of Visit:  6/4/2019    CC:  Patient presents for follow up   Chief Complaint   Patient presents with    Back Pain     center to lower back right leg and hand       HPI:    Pain is better in her low back. Change in quality of symptoms:no. Medication side effects:none. Recent diagnostic testing:none. Recent interventional procedures: Right transforaminal epidural at L4 with 20% relief to date    She has been on anticoagulation medications to include ASA. The patient  has not been on herbal supplements. The patient is pre-diabetic. Imaging studies:  Results for orders placed during the hospital encounter of 05/11/18   [address]    Narrative Reading location: [facility]    Indication: Spondylolisthesis, pain. Comparison: MRI lumbar spine from 4/9/2018; lumbar spine radiograph  from 5/11/2018. Technique: Multiplanar, multisequence MR imaging of the lumbar spine  was performed without administration of intravenous contrast.    FINDINGS:    For purposes of this study, the last fully formed disc is considered  L5-S1 and corresponds to axial image 28 of series 7. There is normal lumbar lordosis. Vertebral bodies maintain normal  height. There is grade 1 anterolisthesis of L3 on L4. There are mild  multilevel degenerative endplate changes. No acute fracture is  identified. Redemonstrated are postsurgical changes from prior posterior  decompression from L3 to L5. There is no abnormal collection within  the laminectomy defects. Conus medullaris terminates at L1-L2 and is normal in caliber and  signal. There is normal distribution of the cauda equina nerve roots. There is multilevel intervertebral disc desiccation.     T11-T12: Seen only on the sagittal sequences, there is a mild diffuse  disc bulge with suggestion of mild left foraminal narrowing. No  central canal or right foraminal narrowing. T12-L1: Minimal disc bulge with superimposed shallow left paracentral  disc protrusion. No central canal or foraminal narrowing. L1-L2: Minimal disc bulge. Mild facet hypertrophy. No central canal  narrowing. Mild bilateral foraminal narrowing. L2-L3: Mild diffuse disc bulge. Moderate bilateral facet hypertrophy  with ligamentum flavum infolding. Mild to moderate bilateral foraminal  narrowing. No central canal narrowing. L3-L4: Grade 1 anterolisthesis with uncovering of the disc and  superimposed diffuse disc bulge. Postsurgical changes from prior  posterior decompression. Bilateral facet hypertrophy. No central canal  narrowing. Moderate bilateral foraminal narrowing. L4-L5: Diffuse disc bulge. Postsurgical changes from prior posterior  decompression. No central canal narrowing. Moderate bilateral  foraminal narrowing. L5-S1: Diffuse disc bulge asymmetric to the left. Postsurgical changes  from prior posterior decompression. Moderate bilateral facet  hypertrophy. No central canal narrowing. Mild right and moderate left  foraminal narrowing. There is susceptibility artifact from a right hip arthroplasty. Impression 1. Postsurgical changes from prior posterior decompression from L3-L4  to L5-S1.  2. Multilevel lumbar spondylosis as described above with moderate  bilateral foraminal narrowing at L3-L4 and L4-L5 and moderate left  foraminal narrowing at L5-S1.  3. Mild to moderate bilateral foraminal narrowing at L2-L3. Bilateral hip XR 05/2018  Right hip arthroplasty noted without loosening or fracture. Left hip   appears normal. The bony pelvis is intact.      Bilateral knee XR 2017  AP, lateral and tangent views of the  left knee obtained.  Bony   alignment intact.  No marked joint space narrowing, fracture or joint   effusion. AP standing views right knee without focal abnormality.      12/2016 EMG:   Impression:  Nerve conduction studies disclosed the above abnormalities suggesting left S1, as well as right L5 radicular disease. However, needle examination was  not performed in either leg to further delineate these abnormalities. There were no peripheral neuropathies. Again, motor radiculopathies cannot be  discerned unless needle examination be performed. Sensory radiculopathies  cannot be evaluated by electrodiagnostic means. Clinical correlation was highly advised. Potential Aberrant Drug-Related Behavior: None    Urine Drug Screenin/15/2018 consistent for lorazepam  2019 Consistent for codeine    OARRS report:  2018 consistent  2019 consistent  2019 consistent  2019 consistent  2019 Consistent   2019 Consistent    Controlled Substances Monitoring:     RX Monitoring 2019   Attestation The Prescription Monitoring Report for this patient was reviewed today. Chronic Pain Routine Monitoring Possible medication side effects, risk of tolerance/dependence & alternative treatments discussed.    Chronic Pain > 80 MEDD -       Past Medical History:   Diagnosis Date    Anxiety     Arthritis     Bipolar 2 disorder (HCC)     Chronic back pain     CKD     Constipation     Continuous urine leakage     COPD (chronic obstructive pulmonary disease) (HCC)     Depression     Dysphagia     GERD (gastroesophageal reflux disease)     History of cardiovascular stress test 10-    LEXISCAN    History of cardiovascular stress test 2014    lexiscan stress    History of cardiovascular stress test 2018    Lexiscan stress test    Hyperlipidemia     MDRO (multiple drug resistant organisms) resistance     mrsa 3-4 yrs ago with back surgery    Menopausal symptoms     atrophic vaginitis    Mitral valve prolapse     Nausea & vomiting     with anesthesia    Neurogenic bladder     Obstructive sleep apnea     uses cpap    Overactive bladder     Pulmonary embolism (HCC)     few yrs ago has vena cava filter    PVD (peripheral vascular disease) [facility] [address] [facility]     Schizoaffective disorder (HCC)     Seizure disorder (HCC)     stares    Thyroid disease     x30 years    Type 2 diabetes mellitus, without long-term current use of insulin ([facility] [address] 7/16/2018       Past Surgical History:   Procedure Laterality Date    ANESTHESIA NERVE BLOCK Right 5/20/2019    RIGHT TRANSFORAMINAL EPIDURAL L4-5 #1 performed by Cayetano Singh MD at [place] [address]      lumbar fusion   [address] [place] [address]      multiple   d/t  incontinence    BREAST SURGERY      bx neg lt    COLONOSCOPY      CYST REMOVAL  2006    vulvular sebaceous cyst excision    DIAGNOSTIC CARDIAC CATH LAB PROCEDURE  1996/1997    showed \"mitral valve prolapse\", records not available     ECHO COMPL W DOP COLOR FLOW  10/10/2012         ENDOSCOPY, COLON, DIAGNOSTIC      FINGER TRIGGER RELEASE Right 03/22/2019    FINGER TRIGGER RELEASE Right 3/22/2019    RIGHT HAND RING FINGER TRIGGER FINGER RELEASE performed by Kecia Dumas DO at [address] [place] [address] Rd Ne Right      screws and rods     HYSTERECTOMY      KNEE ARTHROSCOPY  10-26-12    left    NERVE BLOCK Right 05/20/2019    OTHER SURGICAL HISTORY      supra pubic tubing for 1 1/2 yrs then removed    OTHER SURGICAL HISTORY Bilateral JULY 11, 2013    BILAT MAXILLARY ANTROSTOMY,BILATERAL  INFERIOR TURBINATE CAUTERY AND LATERALIZATION,ENDOSCOPIC EXCISION BILATERAL MIDDLE TURBINATE ELVIS BULLOSA     OTHER SURGICAL HISTORY Left 7-22-15    left knee arthroscopy partial lateral menisectomy chrondroplasty partial synovectomy     [place] ARTHDSIS POST/POSTERLATRL/POSTINTRBDYADL SPC/SEG N/A 7/16/2018    L3-4M L4-5, L5-S1 POSTERIOR LUMBAR INTERBODY FUSION --OARM, AUDIOLOGY, CAGES, PLATES, SCREWS, CRISS, FLORENTIN TABLE, CELL SAVER, PLATELET GEL, MEDTRONIC performed by Benita Turcios MD at [facility]  VENA CAVA FILTER PLACEMENT         Prior to Admission medications    Medication Sig Start Date End Date Taking? Authorizing Provider   acetaminophen-codeine (TYLENOL/CODEINE #3) 300-30 MG per tablet Take 1 tablet by mouth daily as needed for Pain for up to 30 days.  5/6/19 6/5/19  JAM Castillo CNP   vitamin D (ERGOCALCIFEROL) 63905 units CAPS capsule Take 50,000 Units by mouth once a week    Historical Provider, MD   nitroGLYCERIN (NITROSTAT) 0.4 MG SL tablet Place 1 tablet under the tongue every 5 minutes as needed for Chest pain 12/3/18   JAM Healy CNP   loperamide (IMODIUM) 2 MG capsule Take 2 mg by mouth 4 times daily as needed for Diarrhea    Historical Provider, MD   acetaminophen (TYLENOL 8 HOUR ARTHRITIS PAIN) 650 MG extended release tablet Take 500 mg by mouth every 8 hours as needed for Pain     Historical Provider, MD   aspirin 81 MG tablet Take 81 mg by mouth daily Stopped 1 week pre op    Historical Provider, MD   pantoprazole sodium (PROTONIX) 40 MG PACK packet Take 40 mg by mouth every morning (before breakfast)    Historical Provider, MD   calcium carbonate (TUMS) 500 MG chewable tablet Take 2 tablets by mouth 4 times daily as needed for Heartburn    Historical Provider, MD   fluPHENAZine HCl (PROLIXIN) 10 MG tablet Take 10 mg by mouth 2 times daily TAKES AN EXTRA 1/2 TAB AT BEDTIME    Historical Provider, MD   Crisaborole 2 % OINT Apply 1 drop topically daily 6/20/18   Jennifer Baker DO   ARIPiprazole (ABILIFY) 30 MG tablet Take 30 mg by mouth daily    Historical Provider, MD   metFORMIN (GLUCOPHAGE) 500 MG tablet Take 500 mg by mouth daily (with breakfast)    Historical Provider, MD   albuterol sulfate  (90 Base) MCG/ACT inhaler Inhale 2 puffs into the lungs every 6 hours as needed for Wheezing or Shortness of Breath UP TO 4 TIMES A DAY    Historical Provider, MD   sucralfate (CARAFATE) 1 GM tablet Take 1 g by mouth 4 times daily    Historical Provider, MD   albuterol (PROVENTIL) (2.5 MG/3ML) 0.083% nebulizer solution Take 2.5 mg by nebulization 2 times daily UP TO 4 TIMES PER DAY AS NEEDED FOR COUGH, WHEEZING OR SHORTNESS OF BREATH    Historical Provider, MD   LORazepam (ATIVAN) 0.5 MG tablet Take 0.5 mg by mouth 2 times daily. Historical Provider, MD   melatonin 3 MG TABS tablet Take 6 mg by mouth nightly     Historical Provider, MD   oxybutynin (DITROPAN) 5 MG tablet Take 5 mg by mouth 3 times daily    Historical Provider, MD   venlafaxine (EFFEXOR XR) 150 MG extended release capsule Take 225 mg by mouth daily  10/26/16   Historical Provider, MD   polyvinyl alcohol (ARTIFICIAL TEARS) 1.4 % ophthalmic solution Place 1 drop into both eyes 4 times daily     Historical Provider, MD   gabapentin (NEURONTIN) 100 MG capsule Take 100 mg by mouth 3 times daily     Historical Provider, MD   benztropine (COGENTIN) 0.5 MG tablet Take 0.5 mg by mouth 3 times daily     Historical Provider, MD   levothyroxine (SYNTHROID) 25 MCG tablet TAKE 1 TABLET BY MOUTH DAILY. 7/30/15   Cecily Jean,    topiramate (TOPAMAX) 100 MG tablet TAKE 1 TABLET BY MOUTH 2 TIMES DAILY. Patient taking differently: BID 6/23/15   Barry Loyola, DO   docusate sodium (DOCQLACE) 100 MG capsule TAKE 1 CAPSULE BY MOUTH DAILY. Patient taking differently: Take 100 mg by mouth daily TAKE 1 CAPSULE BY MOUTH DAILY. 5/22/15   Gloria Iglesias,    ranitidine (ZANTAC) 150 MG tablet Take 300 mg by mouth nightly     Historical Provider, MD       Allergies   Allergen Reactions    Penicillins Anaphylaxis    Demerol Other (See Comments)     hypotension    Depakote [Divalproex Sodium]      ?     Haldol [Haloperidol Lactate] Other (See Comments)     shakes    Sulfa Antibiotics Nausea And Vomiting       Social History     Socioeconomic History    Marital status:      Spouse name: Not on file    Number of children: Not on file    Years of education: Not on file    Highest education level: Not on file Occupational History    Not on file   Social Needs    Financial resource strain: Not on file    Food insecurity:     Worry: Not on file     Inability: Not on file    Transportation needs:     Medical: Not on file     Non-medical: Not on file   Tobacco Use    Smoking status: Former Smoker     Years: 25.00     Types: Cigarettes     Last attempt to quit: 2002     Years since quittin.4    Smokeless tobacco: Never Used   Substance and Sexual Activity    Alcohol use: No     Comment: quit . 4 cups coffee per day, 2 glasses tea daily    Drug use: No     Comment: quit in  all the other drugs except marijuana that was     Sexual activity: Never   Lifestyle    Physical activity:     Days per week: Not on file     Minutes per session: Not on file    Stress: Not on file   Relationships    Social connections:     Talks on phone: Not on file     Gets together: Not on file     Attends [employer] service: Not on file     Active member of club or organization: Not on file     Attends meetings of clubs or organizations: Not on file     Relationship status: Not on file    Intimate partner violence:     Fear of current or ex partner: Not on file     Emotionally abused: Not on file     Physically abused: Not on file     Forced sexual activity: Not on file   Other Topics Concern    Not on file   Social History Narrative    Not on file       Family History   Problem Relation Age of Onset    Colon Cancer Father     Cancer Father 77        colon cancer    Breast Cancer Mother     Cancer Mother         lung    Other Mother         mother passed away from low platelettes    Heart Attack Mother     Other Brother         hemiplegic from sepsis of spinal cord    Other Brother         MVA    Breast Cancer Maternal Aunt     Breast Cancer Maternal Aunt        REVIEW OF SYSTEMS:     Robbin Alonzoshannon denies fever/chills, chest pain, shortness of breath, new bowel or bladder complaints.  All other review of systems was negative. Denies any new neurologic complaints and/or red flag symptoms. PHYSICAL EXAMINATION:      /68 (Site: Left Upper Arm, Position: Sitting, Cuff Size: Medium Adult)   Pulse 54   Temp 98.7 °F (37.1 °C) (Oral)   Resp 16   Ht 5' 6\" (1.676 m)   Wt 230 lb (104.3 kg)   SpO2 99%   BMI 37.12 kg/m²     General:      General appearance: Pleasant and well-hydrated. In moderate discomfort and alert and oriented x3  Build:Overweight  Function:Rises from a seated position with difficulty     HEENT:     Head:normocephalic and atraumatic  Pupils:regular, round and equal.  Sclera: icterus absent,     Abdomen:     Shape: nobese and non-distended  Tenderness:none  Guarding:none     Lumbar spine:     Spine inspection: Surgical incisional scar and Kyphosis in her thoracic spine  CVA tenderness: None  Palpation: Tenderness paravertebral muscles, facet loading, left, right, positive and tenderness. Range of motion: Abnormal moderately Lateral bending, flexion, extension rotation bilateral and is painful.      Musculoskeletal:     Trigger points in Paravertebral: Present bilaterally  SI joint tenderness: Positive Right  Favian test: Positive Right  Piriformis tenderness: Negative  Trochanteric bursa tenderness: Negative  SLR: Negative, sitting     Extremities:     Tremors: None bilaterally upper and lower  Range of motion: Generally normal shoulders, pain with internal rotation of hips positive (mildly limited)  Intact: Yes  Edema: +2 pitting edema pretibial bilaterally     Neurological:     Sensory: Diminished to light touch bilateral lower extremities  Motor:  Right Quadriceps5/5  Left Quadriceps5/5  Right Gastrocnemius5/5  Left Gastrocnemius5/5  Right Ant Tibialis5/5  Left Ant Tibialis5/5  Reflexes:  Right Quadriceps reflex1+  Left Quadriceps reflex2+  Right Achilles reflex0  Left Achilles reflex0  Gait: Antalgic with walker     Dermatology:     Skin: Scars Left forearm    Assessment/Plan:  Patient seen for chronic low back pain s/p L3-S1 posterior fusion with h/o comorbid psychiatric disorders and history seizure disorder  Fair response from recent Right TFESI at L4, will repeat and add additional level at L5 as she is having pain now in her Right foot and assess her response   Continue with  for her knee  Discontinue Tylenol #3, would like to avoid due to her fear \"where I live. \"   Continue Gabapentin/Effexor and Topamax per psychiatry  Continue with OTC pain medications for mild to moderate pain  OARRS report reviewed  Patient encouraged to stay active as tolerated  Treatment plan discussed with the patient including medications and procedure side effects     ccreferring gabrielle Clarke, APRN - CNP

## 2019-06-11 ENCOUNTER — TELEPHONE (OUTPATIENT)
Dept: PAIN MANAGEMENT | Age: 64
End: 2019-06-11

## 2019-06-11 NOTE — TELEPHONE ENCOUNTER
6-11-19-upon chart review it is noted that Jayesh Langston takes aspirin. Per order, she is to hold it for 7 days before her procedure, her last dose is to be 6-11-19. Left voice mail message for Jayesh Langston.     Osmel Corona RN  Pain Management

## 2019-06-14 NOTE — PROGRESS NOTES
[facility]  INSTRUCTIONS    . ..........................................................................................................................................       ? Parking the day of Surgery is located in the [facility]. Upon entering the door, make an immediate right to the surgery reception desk    ? Bring photo ID and insurance card     ? You may have a light breakfast day of procedure    ? Wear loose comfortable clothing    ? Please follow instructions for medications as given per Dr's office     ? Stop blood thinners as per Dr's office instructions    ? You can expect a call the business day prior to procedure to notify you if your arrival time changes    ? Please arrange for     ?   Other instructions

## 2019-06-18 ENCOUNTER — HOSPITAL ENCOUNTER (OUTPATIENT)
Age: 64
Setting detail: OUTPATIENT SURGERY
Discharge: HOME | End: 2019-06-18
Attending: PAIN MEDICINE | Admitting: PAIN MEDICINE
Payer: MEDICAID

## 2019-06-18 ENCOUNTER — HOSPITAL ENCOUNTER (OUTPATIENT)
Dept: GENERAL RADIOLOGY | Age: 64
Discharge: HOME | End: 2019-06-20
Attending: PAIN MEDICINE
Payer: MEDICAID

## 2019-06-18 VITALS
BODY MASS INDEX: 35.68 KG/M2 | SYSTOLIC BLOOD PRESSURE: 135 MMHG | HEART RATE: 51 BPM | RESPIRATION RATE: 18 BRPM | OXYGEN SATURATION: 98 % | TEMPERATURE: 97.4 F | WEIGHT: 222 LBS | DIASTOLIC BLOOD PRESSURE: 67 MMHG | HEIGHT: 66 IN

## 2019-06-18 DIAGNOSIS — R52 PAIN MANAGEMENT: ICD-10-CM

## 2019-06-18 LAB — METER GLUCOSE: 127 MG/DL (ref 74–99)

## 2019-06-18 PROCEDURE — 82962 GLUCOSE BLOOD TEST: CPT

## 2019-06-18 PROCEDURE — 3209999900 FLUORO FOR SURGICAL PROCEDURES

## 2019-06-18 PROCEDURE — 64483 NJX AA&/STRD TFRM EPI L/S 1: CPT | Performed by: PAIN MEDICINE

## 2019-06-18 PROCEDURE — 6360000002 HC RX W HCPCS: Performed by: PAIN MEDICINE

## 2019-06-18 PROCEDURE — 2500000003 HC RX 250 WO HCPCS: Performed by: PAIN MEDICINE

## 2019-06-18 PROCEDURE — 3600000002 HC SURGERY LEVEL 2 BASE: Performed by: PAIN MEDICINE

## 2019-06-18 PROCEDURE — 7100000011 HC PHASE II RECOVERY - ADDTL 15 MIN: Performed by: PAIN MEDICINE

## 2019-06-18 PROCEDURE — 64484 NJX AA&/STRD TFRM EPI L/S EA: CPT | Performed by: PAIN MEDICINE

## 2019-06-18 PROCEDURE — 6360000004 HC RX CONTRAST MEDICATION: Performed by: PAIN MEDICINE

## 2019-06-18 PROCEDURE — 7100000010 HC PHASE II RECOVERY - FIRST 15 MIN: Performed by: PAIN MEDICINE

## 2019-06-18 PROCEDURE — 2709999900 HC NON-CHARGEABLE SUPPLY: Performed by: PAIN MEDICINE

## 2019-06-18 RX ORDER — METHYLPREDNISOLONE ACETATE 40 MG/ML
INJECTION, SUSPENSION INTRA-ARTICULAR; INTRALESIONAL; INTRAMUSCULAR; SOFT TISSUE PRN
Status: DISCONTINUED | OUTPATIENT
Start: 2019-06-18 | End: 2019-06-18 | Stop reason: ALTCHOICE

## 2019-06-18 RX ORDER — LIDOCAINE HYDROCHLORIDE 5 MG/ML
INJECTION, SOLUTION INFILTRATION; INTRAVENOUS PRN
Status: DISCONTINUED | OUTPATIENT
Start: 2019-06-18 | End: 2019-06-18 | Stop reason: ALTCHOICE

## 2019-06-18 ASSESSMENT — PAIN SCALES - GENERAL
PAINLEVEL_OUTOF10: 2

## 2019-06-18 ASSESSMENT — PAIN DESCRIPTION - PAIN TYPE
TYPE: SURGICAL PAIN

## 2019-06-18 ASSESSMENT — PAIN DESCRIPTION - LOCATION
LOCATION: BACK

## 2019-06-18 NOTE — H&P
 CYST REMOVAL  2006    vulvular sebaceous cyst excision    DIAGNOSTIC CARDIAC CATH LAB PROCEDURE  1996/1997    showed \"mitral valve prolapse\", records not available     ECHO COMPL W DOP COLOR FLOW  10/10/2012         ENDOSCOPY, COLON, DIAGNOSTIC      FINGER TRIGGER RELEASE Right 03/22/2019    FINGER TRIGGER RELEASE Right 3/22/2019    RIGHT HAND RING FINGER TRIGGER FINGER RELEASE performed by Shikha Pride DO at [address] [place] [address]      lt    HIP SURGERY Right      screws and rods     HYSTERECTOMY      KNEE ARTHROSCOPY  10-26-12    left    NERVE BLOCK Right 05/20/2019    OTHER SURGICAL HISTORY      supra pubic tubing for 1 1/2 yrs then removed    OTHER SURGICAL HISTORY Bilateral JULY 11, 2013    BILAT MAXILLARY ANTROSTOMY,BILATERAL  INFERIOR TURBINATE CAUTERY AND LATERALIZATION,ENDOSCOPIC EXCISION BILATERAL MIDDLE TURBINATE ELVIS BULLOSA     OTHER SURGICAL HISTORY Left 7-22-15    left knee arthroscopy partial lateral menisectomy chrondroplasty partial synovectomy     [place] ARTHDSIS POST/POSTERLATRL/POSTINTRBDYADL SPC/SEG N/A 7/16/2018    L3-4M L4-5, L5-S1 POSTERIOR LUMBAR INTERBODY FUSION --OARM, AUDIOLOGY, CAGES, PLATES, SCREWS, CRISS, KAMERON TABLE, CELL SAVER, PLATELET GEL, MEDTRONIC performed by Kameron Martel MD at [address]         Prior to Admission medications    Medication Sig Start Date End Date Taking?  Authorizing Provider   pantoprazole sodium (PROTONIX) 40 MG PACK packet Take 40 mg by mouth every morning (before breakfast)   Yes Historical Provider, MD   fluPHENAZine HCl (PROLIXIN) 10 MG tablet Take 10 mg by mouth 2 times daily TAKES AN EXTRA 1/2 TAB AT BEDTIME   Yes Historical Provider, MD   ARIPiprazole (ABILIFY) 30 MG tablet Take 30 mg by mouth daily   Yes Historical Provider, MD   metFORMIN (GLUCOPHAGE) 500 MG tablet Take 500 mg by mouth daily (with breakfast)   Yes Historical Provider, MD   sucralfate (CARAFATE) 1 GM tablet Take 1 g by mouth 4 times daily   Yes Historical Provider, MD   melatonin 3 MG TABS tablet Take 6 mg by mouth nightly    Yes Historical Provider, MD   oxybutynin (DITROPAN) 5 MG tablet Take 5 mg by mouth 3 times daily   Yes Historical Provider, MD   venlafaxine (EFFEXOR XR) 150 MG extended release capsule Take 225 mg by mouth daily  10/26/16  Yes Historical Provider, MD   polyvinyl alcohol (ARTIFICIAL TEARS) 1.4 % ophthalmic solution Place 1 drop into both eyes 4 times daily    Yes Historical Provider, MD   gabapentin (NEURONTIN) 100 MG capsule Take 100 mg by mouth 3 times daily    Yes Historical Provider, MD   benztropine (COGENTIN) 0.5 MG tablet Take 0.5 mg by mouth 3 times daily    Yes Historical Provider, MD   levothyroxine (SYNTHROID) 25 MCG tablet TAKE 1 TABLET BY MOUTH DAILY. 7/30/15  Yes Cecily Lowery DO   topiramate (TOPAMAX) 100 MG tablet TAKE 1 TABLET BY MOUTH 2 TIMES DAILY. Patient taking differently: BID 6/23/15  Yes Delano Garay,    docusate sodium (DOCQLACE) 100 MG capsule TAKE 1 CAPSULE BY MOUTH DAILY. Patient taking differently: Take 100 mg by mouth daily TAKE 1 CAPSULE BY MOUTH DAILY.  5/22/15  Yes Nuno Gonzales DO   ranitidine (ZANTAC) 150 MG tablet Take 300 mg by mouth nightly    Yes Historical Provider, MD   vitamin D (ERGOCALCIFEROL) 06548 units CAPS capsule Take 50,000 Units by mouth once a week    Historical Provider, MD   nitroGLYCERIN (NITROSTAT) 0.4 MG SL tablet Place 1 tablet under the tongue every 5 minutes as needed for Chest pain  Patient taking differently: Place 0.4 mg under the tongue every 5 minutes as needed for Chest pain Last used 6-3-19 - sees Dr. Mynor Nelson 12/3/18   JAM Healy - CNP   loperamide (IMODIUM) 2 MG capsule Take 2 mg by mouth 4 times daily as needed for Diarrhea    Historical Provider, MD   acetaminophen (TYLENOL 8 HOUR ARTHRITIS PAIN) 650 MG extended release tablet Take 500 mg by mouth every 8 hours as needed for Pain     Historical Provider, MD   aspirin 81 MG tablet Take 81 mg by mouth daily Stopped 1 week pre op    Historical Provider, MD   calcium carbonate (TUMS) 500 MG chewable tablet Take 2 tablets by mouth 4 times daily as needed for Heartburn    Historical Provider, MD   Crisaborole 2 % OINT Apply 1 drop topically daily 18   Everett Borck DO   albuterol sulfate  (90 Base) MCG/ACT inhaler Inhale 2 puffs into the lungs every 6 hours as needed for Wheezing or Shortness of Breath UP TO 4 TIMES A DAY    Historical Provider, MD   albuterol (PROVENTIL) (2.5 MG/3ML) 0.083% nebulizer solution Take 2.5 mg by nebulization 2 times daily UP TO 4 TIMES PER DAY AS NEEDED FOR COUGH, WHEEZING OR SHORTNESS OF BREATH    Historical Provider, MD   LORazepam (ATIVAN) 0.5 MG tablet Take 0.5 mg by mouth 2 times daily. Historical Provider, MD       Allergies   Allergen Reactions    Penicillins Anaphylaxis    Demerol Other (See Comments)     hypotension    Depakote [Divalproex Sodium]      ?     Haldol [Haloperidol Lactate] Other (See Comments)     shakes    Sulfa Antibiotics Nausea And Vomiting       Social History     Socioeconomic History    Marital status:      Spouse name: Not on file    Number of children: Not on file    Years of education: Not on file    Highest education level: Not on file   Occupational History    Not on file   Social Needs    Financial resource strain: Not on file    Food insecurity:     Worry: Not on file     Inability: Not on file    Transportation needs:     Medical: Not on file     Non-medical: Not on file   Tobacco Use    Smoking status: Former Smoker     Years: 25.00     Types: Cigarettes     Last attempt to quit: 2002     Years since quittin.4    Smokeless tobacco: Never Used   Substance and Sexual Activity    Alcohol use: No     Comment: quit . 4 cups coffee per day, 2 glasses tea daily    Drug use: No     Comment: quit in 222 lb (100.7 kg)   SpO2 99%   BMI 35.83 kg/m²     CONSTITUTIONAL:  awake, alert, cooperative, no apparent distress, and appears stated age    EYES: PERRLA, EOMI    LUNGS:  No increased work of breathing, no audible wheezing    CARDIOVASCULAR:  regular rate and rhythm    ABDOMEN:  Soft non tender non distended     EXTREMITIES: no signs of clubbing or cyanosis. MUSCULOSKELETAL: negative for flaccid muscle tone or spastic movements. SKIN: gross examination reveals no signs of rashes, or diaphoresis. NEURO: Cranial nerves II-XII grossly intact. No signs of agitated mood.        Assessment/Plan:    Low back and right leg pain for Right L4 and S1 TFESI

## 2019-06-19 ENCOUNTER — PROCEDURE VISIT (OUTPATIENT)
Dept: AUDIOLOGY | Age: 64
End: 2019-06-19

## 2019-06-19 ENCOUNTER — TELEPHONE (OUTPATIENT)
Dept: ENT CLINIC | Age: 64
End: 2019-06-19

## 2019-06-19 ENCOUNTER — OFFICE VISIT (OUTPATIENT)
Dept: ENT CLINIC | Age: 64
End: 2019-06-19
Payer: MEDICAID

## 2019-06-19 VITALS
WEIGHT: 230.4 LBS | DIASTOLIC BLOOD PRESSURE: 60 MMHG | SYSTOLIC BLOOD PRESSURE: 122 MMHG | HEART RATE: 54 BPM | BODY MASS INDEX: 37.19 KG/M2

## 2019-06-19 DIAGNOSIS — H90.3 SENSORINEURAL HEARING LOSS, BILATERAL: Primary | ICD-10-CM

## 2019-06-19 DIAGNOSIS — H60.8X3 CHRONIC ECZEMATOUS OTITIS EXTERNA OF BOTH EARS: ICD-10-CM

## 2019-06-19 DIAGNOSIS — L29.9 ITCHING OF EAR: ICD-10-CM

## 2019-06-19 DIAGNOSIS — H90.3 SENSORINEURAL HEARING LOSS (SNHL) OF BOTH EARS: Primary | ICD-10-CM

## 2019-06-19 PROCEDURE — 99213 OFFICE O/P EST LOW 20 MIN: CPT | Performed by: OTOLARYNGOLOGY

## 2019-06-19 PROCEDURE — 99999 PR OFFICE/OUTPT VISIT,PROCEDURE ONLY: CPT | Performed by: AUDIOLOGIST

## 2019-06-19 RX ORDER — TRIAMCINOLONE ACETONIDE 0.25 MG/G
CREAM TOPICAL
Qty: 1 TUBE | Refills: 0 | Status: SHIPPED
Start: 2019-06-19 | End: 2020-06-09

## 2019-06-19 NOTE — PROGRESS NOTES
[facility] Otolaryngology  Dr. Marcello Primrose. Kim Verdugo. Ms.Ed        Patient Name:  Padmini Casas  :  1955     CHIEF C/O:    Chief Complaint   Patient presents with    Ear Problem     itchy ears        HISTORY OBTAINED FROM:  patient    HISTORY OF PRESENT ILLNESS:       Adrián Beasley is a 62y.o. year old female, here today for follow up of patient is seen and examined for history of hearing loss, as well as a history of onset of external otitis this chronic with associated pruritus frequent irritation and flaking. No complaints of ear pain or hearing loss, no complaints of vertigo. No complaints of headaches or vision changes. No current medical therapies were initiated for current ear complaints, denies any changes in her hearing loss. Audiogram reviewed today, no other new complaints of nasal congestion sore throat or changes in voice.       Past Medical History:   Diagnosis Date    Anxiety     Arthritis     Bipolar 2 disorder ([facility])     Chronic back pain     CKD     Constipation     Continuous urine leakage     COPD (chronic obstructive pulmonary disease) ([facility])     no oxygen use, BIpap at night     Depression     Dysphagia     GERD (gastroesophageal reflux disease)     History of cardiovascular stress test 10-    LEXISCAN    History of cardiovascular stress test 2014    lexiscan stress    History of cardiovascular stress test 2018    Lexiscan stress test    Hx of cardiovascular stress test 2019    Lexiscan stress test    Hyperlipidemia     MDRO (multiple drug resistant organisms) resistance     mrsa 3-4 yrs ago with back surgery ()    Menopausal symptoms     atrophic vaginitis    Mitral valve prolapse     Nausea & vomiting     with anesthesia    Neurogenic bladder     Obstructive sleep apnea     uses cpap    EMILEE treated with BiPAP     Overactive bladder     Pulmonary embolism (HCC)     few yrs ago has vena cava filter    PVD (peripheral vascular disease) ([facility] [address] [employer] performed by Sabas Westbrook MD at [facility]         Current Outpatient Medications:     triamcinolone (KENALOG) 0.025 % cream, Apply Topically once per week to external ears, Disp: 1 Tube, Rfl: 0    vitamin D (ERGOCALCIFEROL) 99279 units CAPS capsule, Take 50,000 Units by mouth once a week, Disp: , Rfl:     nitroGLYCERIN (NITROSTAT) 0.4 MG SL tablet, Place 1 tablet under the tongue every 5 minutes as needed for Chest pain (Patient taking differently: Place 0.4 mg under the tongue every 5 minutes as needed for Chest pain Last used 6-3-19 - sees Dr. Tiana Cramer), Disp: 25 tablet, Rfl: 0    loperamide (IMODIUM) 2 MG capsule, Take 2 mg by mouth 4 times daily as needed for Diarrhea, Disp: , Rfl:     acetaminophen (TYLENOL 8 HOUR ARTHRITIS PAIN) 650 MG extended release tablet, Take 500 mg by mouth every 8 hours as needed for Pain , Disp: , Rfl:     pantoprazole sodium (PROTONIX) 40 MG PACK packet, Take 40 mg by mouth every morning (before breakfast), Disp: , Rfl:     calcium carbonate (TUMS) 500 MG chewable tablet, Take 2 tablets by mouth 4 times daily as needed for Heartburn, Disp: , Rfl:     fluPHENAZine HCl (PROLIXIN) 10 MG tablet, Take 10 mg by mouth 2 times daily TAKES AN EXTRA 1/2 TAB AT BEDTIME, Disp: , Rfl:     Crisaborole 2 % OINT, Apply 1 drop topically daily, Disp: 1 Tube, Rfl: 1    ARIPiprazole (ABILIFY) 30 MG tablet, Take 30 mg by mouth daily, Disp: , Rfl:     metFORMIN (GLUCOPHAGE) 500 MG tablet, Take 500 mg by mouth daily (with breakfast), Disp: , Rfl:     albuterol sulfate  (90 Base) MCG/ACT inhaler, Inhale 2 puffs into the lungs every 6 hours as needed for Wheezing or Shortness of Breath UP TO 4 TIMES A DAY, Disp: , Rfl:     sucralfate (CARAFATE) 1 GM tablet, Take 1 g by mouth 4 times daily, Disp: , Rfl:     albuterol (PROVENTIL) (2.5 MG/3ML) 0.083% nebulizer solution, Take 2.5 mg by nebulization 2 times daily UP TO 4 TIMES PER DAY AS NEEDED FOR COUGH, WHEEZING OR SHORTNESS OF BREATH, Disp: , Rfl:     LORazepam (ATIVAN) 0.5 MG tablet, Take 0.5 mg by mouth 2 times daily. , Disp: , Rfl:     melatonin 3 MG TABS tablet, Take 6 mg by mouth nightly , Disp: , Rfl:     oxybutynin (DITROPAN) 5 MG tablet, Take 5 mg by mouth 3 times daily, Disp: , Rfl:     venlafaxine (EFFEXOR XR) 150 MG extended release capsule, Take 225 mg by mouth daily , Disp: , Rfl:     polyvinyl alcohol (ARTIFICIAL TEARS) 1.4 % ophthalmic solution, Place 1 drop into both eyes 4 times daily , Disp: , Rfl:     gabapentin (NEURONTIN) 100 MG capsule, Take 100 mg by mouth 3 times daily , Disp: , Rfl:     benztropine (COGENTIN) 0.5 MG tablet, Take 0.5 mg by mouth 3 times daily , Disp: , Rfl:     levothyroxine (SYNTHROID) 25 MCG tablet, TAKE 1 TABLET BY MOUTH DAILY. , Disp: 30 tablet, Rfl: 4    topiramate (TOPAMAX) 100 MG tablet, TAKE 1 TABLET BY MOUTH 2 TIMES DAILY. (Patient taking differently: BID), Disp: 60 tablet, Rfl: 4    docusate sodium (DOCQLACE) 100 MG capsule, TAKE 1 CAPSULE BY MOUTH DAILY. (Patient taking differently: Take 100 mg by mouth daily TAKE 1 CAPSULE BY MOUTH DAILY.), Disp: 30 capsule, Rfl: 3    ranitidine (ZANTAC) 150 MG tablet, Take 300 mg by mouth nightly , Disp: , Rfl:     aspirin 81 MG chewable tablet, Take 1 tablet by mouth daily, Disp: 30 tablet, Rfl: 3  Penicillins; Demerol; Depakote [divalproex sodium];  Haldol [haloperidol lactate]; and Sulfa antibiotics  Social History     Tobacco Use    Smoking status: Former Smoker     Years: 25.00     Types: Cigarettes     Last attempt to quit: 2002     Years since quittin.5    Smokeless tobacco: Never Used   Substance Use Topics    Alcohol use: No     Comment: quit . 4 cups coffee per day, 2 glasses tea daily    Drug use: No     Comment: quit in  all the other drugs except marijuana that was 1986     Family History   Problem Relation Age of Onset    Colon Cancer Father     Cancer Father 77        colon cancer    Breast Cancer Mother     Cancer Mother         lung    Other Mother         mother passed away from low platelettes    Heart Attack Mother     Other Brother         hemiplegic from sepsis of spinal cord    Other Brother         MVA    Breast Cancer Maternal Aunt     Breast Cancer Maternal Aunt        Review of Systems   Constitutional: Negative for chills and fever. HENT: Positive for hearing loss. Negative for congestion, ear discharge, ear pain and facial swelling. Respiratory: Negative for cough and shortness of breath. Cardiovascular: Negative for chest pain and palpitations. Gastrointestinal: Negative for vomiting. Skin: Negative for rash. Allergic/Immunologic: Negative for environmental allergies. Neurological: Negative for dizziness and headaches. Hematological: Does not bruise/bleed easily. All other systems reviewed and are negative. /60 (Site: Left Upper Arm, Position: Sitting, Cuff Size: Large Adult)   Pulse 54   Wt 230 lb 6.4 oz (104.5 kg)   BMI 37.19 kg/m²   Physical Exam   Constitutional: She appears well-developed and well-nourished. HENT:   Head: Normocephalic. Right Ear: Tympanic membrane normal. Decreased hearing is noted. Left Ear: Tympanic membrane normal. Decreased hearing is noted. Ears:    Nose: Nose normal.   Mouth/Throat: Oropharynx is clear and moist. No oropharyngeal exudate. Eyes: Pupils are equal, round, and reactive to light. EOM are normal.   Neck: Normal range of motion. No tracheal deviation present. No thyromegaly present. Cardiovascular: Normal rate and intact distal pulses. Pulmonary/Chest: Effort normal. No respiratory distress. Musculoskeletal: Normal range of motion. She exhibits no edema. Lymphadenopathy:     She has no cervical adenopathy. Neurological: She is alert. No cranial nerve deficit. Skin: Skin is warm. No erythema.    Nursing note

## 2019-06-19 NOTE — PROGRESS NOTES
Per ENT/Audiology notes:  Yearly hearing evaluation completed 1/2019, therefore unable to perform another hearing test, until after 1/2020. Hearing aid check performed and speaker for left hearing aid changed for patient. No other issues noted. Patient to return PRN.

## 2019-06-27 ENCOUNTER — OFFICE VISIT (OUTPATIENT)
Dept: ORTHOPEDIC SURGERY | Age: 64
End: 2019-06-27
Payer: MEDICAID

## 2019-06-27 VITALS — WEIGHT: 230 LBS | BODY MASS INDEX: 36.96 KG/M2 | HEIGHT: 66 IN

## 2019-06-27 DIAGNOSIS — M17.11 PRIMARY OSTEOARTHRITIS OF RIGHT KNEE: Primary | ICD-10-CM

## 2019-06-27 PROCEDURE — 20610 DRAIN/INJ JOINT/BURSA W/O US: CPT | Performed by: ORTHOPAEDIC SURGERY

## 2019-06-27 PROCEDURE — 99213 OFFICE O/P EST LOW 20 MIN: CPT | Performed by: ORTHOPAEDIC SURGERY

## 2019-06-28 ENCOUNTER — TELEPHONE (OUTPATIENT)
Dept: PAIN MANAGEMENT | Age: 64
End: 2019-06-28

## 2019-07-02 ENCOUNTER — OFFICE VISIT (OUTPATIENT)
Dept: PAIN MANAGEMENT | Age: 64
End: 2019-07-02
Payer: MEDICAID

## 2019-07-02 VITALS
WEIGHT: 227 LBS | HEIGHT: 66 IN | SYSTOLIC BLOOD PRESSURE: 102 MMHG | BODY MASS INDEX: 36.48 KG/M2 | OXYGEN SATURATION: 97 % | HEART RATE: 60 BPM | TEMPERATURE: 98.2 F | RESPIRATION RATE: 16 BRPM | DIASTOLIC BLOOD PRESSURE: 66 MMHG

## 2019-07-02 DIAGNOSIS — M51.36 LUMBAR DEGENERATIVE DISC DISEASE: Primary | ICD-10-CM

## 2019-07-02 DIAGNOSIS — G89.4 CHRONIC PAIN SYNDROME: ICD-10-CM

## 2019-07-02 PROCEDURE — 99213 OFFICE O/P EST LOW 20 MIN: CPT | Performed by: NURSE PRACTITIONER

## 2019-07-02 RX ORDER — MELOXICAM 7.5 MG/1
7.5 TABLET ORAL DAILY
Qty: 30 TABLET | Refills: 1 | Status: ON HOLD | OUTPATIENT
Start: 2019-07-02 | End: 2019-07-04 | Stop reason: HOSPADM

## 2019-07-03 ENCOUNTER — HOSPITAL ENCOUNTER (OUTPATIENT)
Age: 64
Setting detail: OBSERVATION
Discharge: NURSING, REHAB OR LONG-TERM CARE | End: 2019-07-04
Attending: EMERGENCY MEDICINE | Admitting: INTERNAL MEDICINE
Payer: MEDICAID

## 2019-07-03 ENCOUNTER — APPOINTMENT (OUTPATIENT)
Dept: GENERAL RADIOLOGY | Age: 64
End: 2019-07-03
Payer: MEDICAID

## 2019-07-03 DIAGNOSIS — R07.9 CHEST PAIN, UNSPECIFIED TYPE: Primary | ICD-10-CM

## 2019-07-03 PROBLEM — R07.89 ATYPICAL CHEST PAIN: Status: ACTIVE | Noted: 2019-07-03

## 2019-07-03 LAB
ANION GAP SERPL CALCULATED.3IONS-SCNC: 13 MMOL/L (ref 7–16)
BASOPHILS ABSOLUTE: 0.02 E9/L (ref 0–0.2)
BASOPHILS RELATIVE PERCENT: 0.2 % (ref 0–2)
BUN BLDV-MCNC: 14 MG/DL (ref 8–23)
CALCIUM SERPL-MCNC: 9.5 MG/DL (ref 8.6–10.2)
CHLORIDE BLD-SCNC: 104 MMOL/L (ref 98–107)
CO2: 24 MMOL/L (ref 22–29)
CREAT SERPL-MCNC: 1 MG/DL (ref 0.5–1)
EOSINOPHILS ABSOLUTE: 0.28 E9/L (ref 0.05–0.5)
EOSINOPHILS RELATIVE PERCENT: 3.4 % (ref 0–6)
GFR AFRICAN AMERICAN: >60
GFR NON-AFRICAN AMERICAN: 56 ML/MIN/1.73
GLUCOSE BLD-MCNC: 104 MG/DL (ref 74–99)
HCT VFR BLD CALC: 41.4 % (ref 34–48)
HEMOGLOBIN: 13.4 G/DL (ref 11.5–15.5)
IMMATURE GRANULOCYTES #: 0.03 E9/L
IMMATURE GRANULOCYTES %: 0.4 % (ref 0–5)
LYMPHOCYTES ABSOLUTE: 1.84 E9/L (ref 1.5–4)
LYMPHOCYTES RELATIVE PERCENT: 22.4 % (ref 20–42)
MCH RBC QN AUTO: 29.5 PG (ref 26–35)
MCHC RBC AUTO-ENTMCNC: 32.4 % (ref 32–34.5)
MCV RBC AUTO: 91 FL (ref 80–99.9)
MONOCYTES ABSOLUTE: 0.47 E9/L (ref 0.1–0.95)
MONOCYTES RELATIVE PERCENT: 5.7 % (ref 2–12)
NEUTROPHILS ABSOLUTE: 5.59 E9/L (ref 1.8–7.3)
NEUTROPHILS RELATIVE PERCENT: 67.9 % (ref 43–80)
PDW BLD-RTO: 14.4 FL (ref 11.5–15)
PLATELET # BLD: 162 E9/L (ref 130–450)
PMV BLD AUTO: 10.7 FL (ref 7–12)
POTASSIUM SERPL-SCNC: 4.1 MMOL/L (ref 3.5–5)
RBC # BLD: 4.55 E12/L (ref 3.5–5.5)
SODIUM BLD-SCNC: 141 MMOL/L (ref 132–146)
TROPONIN: <0.01 NG/ML (ref 0–0.03)
WBC # BLD: 8.2 E9/L (ref 4.5–11.5)

## 2019-07-03 PROCEDURE — 80048 BASIC METABOLIC PNL TOTAL CA: CPT

## 2019-07-03 PROCEDURE — 36415 COLL VENOUS BLD VENIPUNCTURE: CPT

## 2019-07-03 PROCEDURE — 6370000000 HC RX 637 (ALT 250 FOR IP): Performed by: EMERGENCY MEDICINE

## 2019-07-03 PROCEDURE — 99285 EMERGENCY DEPT VISIT HI MDM: CPT

## 2019-07-03 PROCEDURE — 84484 ASSAY OF TROPONIN QUANT: CPT

## 2019-07-03 PROCEDURE — 85025 COMPLETE CBC W/AUTO DIFF WBC: CPT

## 2019-07-03 PROCEDURE — 71045 X-RAY EXAM CHEST 1 VIEW: CPT

## 2019-07-03 PROCEDURE — 93005 ELECTROCARDIOGRAM TRACING: CPT | Performed by: EMERGENCY MEDICINE

## 2019-07-03 RX ORDER — NITROGLYCERIN 0.4 MG/1
0.4 TABLET SUBLINGUAL EVERY 5 MIN PRN
Status: DISCONTINUED | OUTPATIENT
Start: 2019-07-03 | End: 2019-07-05 | Stop reason: HOSPADM

## 2019-07-03 RX ORDER — ASPIRIN 81 MG/1
324 TABLET, CHEWABLE ORAL ONCE
Status: COMPLETED | OUTPATIENT
Start: 2019-07-03 | End: 2019-07-03

## 2019-07-03 RX ADMIN — ASPIRIN 81 MG 324 MG: 81 TABLET ORAL at 19:10

## 2019-07-03 ASSESSMENT — PAIN DESCRIPTION - DESCRIPTORS
DESCRIPTORS: DULL
DESCRIPTORS: ACHING;DISCOMFORT;DULL

## 2019-07-03 ASSESSMENT — PAIN DESCRIPTION - PAIN TYPE: TYPE: ACUTE PAIN

## 2019-07-03 ASSESSMENT — PAIN DESCRIPTION - ORIENTATION
ORIENTATION: MID
ORIENTATION: LEFT

## 2019-07-03 ASSESSMENT — PAIN DESCRIPTION - PROGRESSION: CLINICAL_PROGRESSION: GRADUALLY IMPROVING

## 2019-07-03 ASSESSMENT — ENCOUNTER SYMPTOMS: ABDOMINAL PAIN: 0

## 2019-07-03 ASSESSMENT — PAIN DESCRIPTION - ONSET: ONSET: ON-GOING

## 2019-07-03 ASSESSMENT — PAIN DESCRIPTION - LOCATION
LOCATION: CHEST
LOCATION: CHEST

## 2019-07-03 ASSESSMENT — PAIN DESCRIPTION - FREQUENCY
FREQUENCY: CONTINUOUS
FREQUENCY: INTERMITTENT

## 2019-07-03 ASSESSMENT — PAIN SCALES - GENERAL
PAINLEVEL_OUTOF10: 3
PAINLEVEL_OUTOF10: 2

## 2019-07-04 ENCOUNTER — APPOINTMENT (OUTPATIENT)
Dept: NUCLEAR MEDICINE | Age: 64
End: 2019-07-04
Payer: MEDICAID

## 2019-07-04 VITALS
SYSTOLIC BLOOD PRESSURE: 115 MMHG | HEART RATE: 63 BPM | BODY MASS INDEX: 36.53 KG/M2 | DIASTOLIC BLOOD PRESSURE: 70 MMHG | RESPIRATION RATE: 19 BRPM | OXYGEN SATURATION: 98 % | TEMPERATURE: 97.7 F | HEIGHT: 66 IN | WEIGHT: 227.31 LBS

## 2019-07-04 LAB
ALBUMIN SERPL-MCNC: 4 G/DL (ref 3.5–5.2)
ALP BLD-CCNC: 76 U/L (ref 35–104)
ALT SERPL-CCNC: 15 U/L (ref 0–32)
ANION GAP SERPL CALCULATED.3IONS-SCNC: 11 MMOL/L (ref 7–16)
AST SERPL-CCNC: 17 U/L (ref 0–31)
BASOPHILS ABSOLUTE: 0.01 E9/L (ref 0–0.2)
BASOPHILS RELATIVE PERCENT: 0.1 % (ref 0–2)
BILIRUB SERPL-MCNC: 0.3 MG/DL (ref 0–1.2)
BUN BLDV-MCNC: 13 MG/DL (ref 8–23)
CALCIUM SERPL-MCNC: 9 MG/DL (ref 8.6–10.2)
CHLORIDE BLD-SCNC: 108 MMOL/L (ref 98–107)
CHOLESTEROL, TOTAL: 192 MG/DL (ref 0–199)
CK MB: 1.9 NG/ML (ref 0–4.3)
CK MB: 2.1 NG/ML (ref 0–4.3)
CO2: 23 MMOL/L (ref 22–29)
CREAT SERPL-MCNC: 0.9 MG/DL (ref 0.5–1)
EKG ATRIAL RATE: 66 BPM
EKG P AXIS: 35 DEGREES
EKG P-R INTERVAL: 180 MS
EKG Q-T INTERVAL: 426 MS
EKG QRS DURATION: 102 MS
EKG QTC CALCULATION (BAZETT): 446 MS
EKG R AXIS: -26 DEGREES
EKG T AXIS: 20 DEGREES
EKG VENTRICULAR RATE: 66 BPM
EOSINOPHILS ABSOLUTE: 0.26 E9/L (ref 0.05–0.5)
EOSINOPHILS RELATIVE PERCENT: 3.3 % (ref 0–6)
GFR AFRICAN AMERICAN: >60
GFR NON-AFRICAN AMERICAN: >60 ML/MIN/1.73
GLUCOSE BLD-MCNC: 113 MG/DL (ref 74–99)
HBA1C MFR BLD: 5.9 % (ref 4–5.6)
HCT VFR BLD CALC: 40.2 % (ref 34–48)
HDLC SERPL-MCNC: 66 MG/DL
HEMOGLOBIN: 13.5 G/DL (ref 11.5–15.5)
IMMATURE GRANULOCYTES #: 0.03 E9/L
IMMATURE GRANULOCYTES %: 0.4 % (ref 0–5)
LDL CHOLESTEROL CALCULATED: 108 MG/DL (ref 0–99)
LV EF: 66 %
LV EF: 66 %
LVEF MODALITY: NORMAL
LVEF MODALITY: NORMAL
LYMPHOCYTES ABSOLUTE: 1.2 E9/L (ref 1.5–4)
LYMPHOCYTES RELATIVE PERCENT: 15.4 % (ref 20–42)
MCH RBC QN AUTO: 30.1 PG (ref 26–35)
MCHC RBC AUTO-ENTMCNC: 33.6 % (ref 32–34.5)
MCV RBC AUTO: 89.5 FL (ref 80–99.9)
MONOCYTES ABSOLUTE: 0.51 E9/L (ref 0.1–0.95)
MONOCYTES RELATIVE PERCENT: 6.6 % (ref 2–12)
NEUTROPHILS ABSOLUTE: 5.76 E9/L (ref 1.8–7.3)
NEUTROPHILS RELATIVE PERCENT: 74.2 % (ref 43–80)
PDW BLD-RTO: 14.4 FL (ref 11.5–15)
PLATELET # BLD: 143 E9/L (ref 130–450)
PMV BLD AUTO: 10.9 FL (ref 7–12)
POTASSIUM SERPL-SCNC: 4 MMOL/L (ref 3.5–5)
RBC # BLD: 4.49 E12/L (ref 3.5–5.5)
SODIUM BLD-SCNC: 142 MMOL/L (ref 132–146)
TOTAL PROTEIN: 6.7 G/DL (ref 6.4–8.3)
TRIGL SERPL-MCNC: 91 MG/DL (ref 0–149)
TROPONIN: <0.01 NG/ML (ref 0–0.03)
TROPONIN: <0.01 NG/ML (ref 0–0.03)
TSH SERPL DL<=0.05 MIU/L-ACNC: 0.96 UIU/ML (ref 0.27–4.2)
VLDLC SERPL CALC-MCNC: 18 MG/DL
WBC # BLD: 7.8 E9/L (ref 4.5–11.5)

## 2019-07-04 PROCEDURE — 82553 CREATINE MB FRACTION: CPT

## 2019-07-04 PROCEDURE — 6360000002 HC RX W HCPCS: Performed by: FAMILY MEDICINE

## 2019-07-04 PROCEDURE — 85025 COMPLETE CBC W/AUTO DIFF WBC: CPT

## 2019-07-04 PROCEDURE — G0378 HOSPITAL OBSERVATION PER HR: HCPCS

## 2019-07-04 PROCEDURE — 80061 LIPID PANEL: CPT

## 2019-07-04 PROCEDURE — 84484 ASSAY OF TROPONIN QUANT: CPT

## 2019-07-04 PROCEDURE — 96372 THER/PROPH/DIAG INJ SC/IM: CPT

## 2019-07-04 PROCEDURE — 36415 COLL VENOUS BLD VENIPUNCTURE: CPT

## 2019-07-04 PROCEDURE — A9500 TC99M SESTAMIBI: HCPCS | Performed by: RADIOLOGY

## 2019-07-04 PROCEDURE — 93306 TTE W/DOPPLER COMPLETE: CPT

## 2019-07-04 PROCEDURE — 94640 AIRWAY INHALATION TREATMENT: CPT

## 2019-07-04 PROCEDURE — 93017 CV STRESS TEST TRACING ONLY: CPT

## 2019-07-04 PROCEDURE — 83036 HEMOGLOBIN GLYCOSYLATED A1C: CPT

## 2019-07-04 PROCEDURE — 78452 HT MUSCLE IMAGE SPECT MULT: CPT

## 2019-07-04 PROCEDURE — 80053 COMPREHEN METABOLIC PANEL: CPT

## 2019-07-04 PROCEDURE — 6370000000 HC RX 637 (ALT 250 FOR IP): Performed by: FAMILY MEDICINE

## 2019-07-04 PROCEDURE — 84443 ASSAY THYROID STIM HORMONE: CPT

## 2019-07-04 PROCEDURE — 93010 ELECTROCARDIOGRAM REPORT: CPT | Performed by: INTERNAL MEDICINE

## 2019-07-04 PROCEDURE — 3430000000 HC RX DIAGNOSTIC RADIOPHARMACEUTICAL: Performed by: RADIOLOGY

## 2019-07-04 RX ORDER — FAMOTIDINE 20 MG/1
20 TABLET, FILM COATED ORAL DAILY
Status: DISCONTINUED | OUTPATIENT
Start: 2019-07-04 | End: 2019-07-05 | Stop reason: HOSPADM

## 2019-07-04 RX ORDER — OXYBUTYNIN CHLORIDE 5 MG/1
5 TABLET ORAL 3 TIMES DAILY
Status: DISCONTINUED | OUTPATIENT
Start: 2019-07-04 | End: 2019-07-05 | Stop reason: HOSPADM

## 2019-07-04 RX ORDER — CALCIUM CARBONATE 200(500)MG
2 TABLET,CHEWABLE ORAL 4 TIMES DAILY PRN
Status: DISCONTINUED | OUTPATIENT
Start: 2019-07-04 | End: 2019-07-05 | Stop reason: HOSPADM

## 2019-07-04 RX ORDER — POLYVINYL ALCOHOL 14 MG/ML
1 SOLUTION/ DROPS OPHTHALMIC 4 TIMES DAILY
Status: DISCONTINUED | OUTPATIENT
Start: 2019-07-04 | End: 2019-07-04 | Stop reason: SDUPTHER

## 2019-07-04 RX ORDER — LANOLIN ALCOHOL/MO/W.PET/CERES
6 CREAM (GRAM) TOPICAL NIGHTLY
Status: DISCONTINUED | OUTPATIENT
Start: 2019-07-04 | End: 2019-07-05 | Stop reason: HOSPADM

## 2019-07-04 RX ORDER — ALBUTEROL SULFATE 90 UG/1
2 AEROSOL, METERED RESPIRATORY (INHALATION) EVERY 6 HOURS PRN
Status: DISCONTINUED | OUTPATIENT
Start: 2019-07-04 | End: 2019-07-05 | Stop reason: HOSPADM

## 2019-07-04 RX ORDER — LEVOTHYROXINE SODIUM 0.03 MG/1
25 TABLET ORAL DAILY
Status: DISCONTINUED | OUTPATIENT
Start: 2019-07-04 | End: 2019-07-05 | Stop reason: HOSPADM

## 2019-07-04 RX ORDER — ASPIRIN 81 MG/1
81 TABLET, CHEWABLE ORAL DAILY
Qty: 30 TABLET | Refills: 3 | DISCHARGE
Start: 2019-07-05

## 2019-07-04 RX ORDER — PANTOPRAZOLE SODIUM 40 MG/1
40 GRANULE, DELAYED RELEASE ORAL
Status: DISCONTINUED | OUTPATIENT
Start: 2019-07-04 | End: 2019-07-05 | Stop reason: HOSPADM

## 2019-07-04 RX ORDER — ARIPIPRAZOLE 15 MG/1
30 TABLET ORAL DAILY
Status: DISCONTINUED | OUTPATIENT
Start: 2019-07-04 | End: 2019-07-05 | Stop reason: HOSPADM

## 2019-07-04 RX ORDER — DOCUSATE SODIUM 100 MG/1
100 CAPSULE, LIQUID FILLED ORAL DAILY
Status: DISCONTINUED | OUTPATIENT
Start: 2019-07-04 | End: 2019-07-05 | Stop reason: HOSPADM

## 2019-07-04 RX ORDER — FLUPHENAZINE HYDROCHLORIDE 5 MG/1
10 TABLET ORAL 2 TIMES DAILY
Status: DISCONTINUED | OUTPATIENT
Start: 2019-07-04 | End: 2019-07-05 | Stop reason: HOSPADM

## 2019-07-04 RX ORDER — SUCRALFATE 1 G/1
1 TABLET ORAL 4 TIMES DAILY
Status: DISCONTINUED | OUTPATIENT
Start: 2019-07-04 | End: 2019-07-05 | Stop reason: HOSPADM

## 2019-07-04 RX ORDER — ALBUTEROL SULFATE 2.5 MG/3ML
2.5 SOLUTION RESPIRATORY (INHALATION) 2 TIMES DAILY
Status: DISCONTINUED | OUTPATIENT
Start: 2019-07-04 | End: 2019-07-05 | Stop reason: HOSPADM

## 2019-07-04 RX ORDER — GABAPENTIN 100 MG/1
100 CAPSULE ORAL 3 TIMES DAILY
Status: DISCONTINUED | OUTPATIENT
Start: 2019-07-04 | End: 2019-07-05 | Stop reason: HOSPADM

## 2019-07-04 RX ORDER — LOPERAMIDE HYDROCHLORIDE 2 MG/1
2 CAPSULE ORAL 4 TIMES DAILY PRN
Status: DISCONTINUED | OUTPATIENT
Start: 2019-07-04 | End: 2019-07-05 | Stop reason: HOSPADM

## 2019-07-04 RX ORDER — ACETAMINOPHEN 325 MG/1
650 TABLET ORAL EVERY 4 HOURS PRN
Status: DISCONTINUED | OUTPATIENT
Start: 2019-07-04 | End: 2019-07-05 | Stop reason: HOSPADM

## 2019-07-04 RX ORDER — ASPIRIN 81 MG/1
81 TABLET, CHEWABLE ORAL DAILY
Status: DISCONTINUED | OUTPATIENT
Start: 2019-07-04 | End: 2019-07-05 | Stop reason: HOSPADM

## 2019-07-04 RX ORDER — TOPIRAMATE 100 MG/1
100 TABLET, FILM COATED ORAL 2 TIMES DAILY
Status: DISCONTINUED | OUTPATIENT
Start: 2019-07-04 | End: 2019-07-05 | Stop reason: HOSPADM

## 2019-07-04 RX ORDER — LORAZEPAM 0.5 MG/1
0.5 TABLET ORAL 2 TIMES DAILY
Status: DISCONTINUED | OUTPATIENT
Start: 2019-07-04 | End: 2019-07-05 | Stop reason: HOSPADM

## 2019-07-04 RX ORDER — BENZTROPINE MESYLATE 0.5 MG/1
0.5 TABLET ORAL 3 TIMES DAILY
Status: DISCONTINUED | OUTPATIENT
Start: 2019-07-04 | End: 2019-07-05 | Stop reason: HOSPADM

## 2019-07-04 RX ADMIN — BENZALKONIUM CHLORIDE, SODIUM PHOSPHATE, DIBASIC, EDETATE DISODIUM,SODIUM PHOSPHATE, MONOBASIC, SODIUM CHLORIDE, WATER, PHOSPHORIC ACID, SODIUM HYDROXIDE 1 DROP: 14 SOLUTION INTRAOCULAR at 17:13

## 2019-07-04 RX ADMIN — OXYBUTYNIN CHLORIDE 5 MG: 5 TABLET ORAL at 08:01

## 2019-07-04 RX ADMIN — OXYBUTYNIN CHLORIDE 5 MG: 5 TABLET ORAL at 21:05

## 2019-07-04 RX ADMIN — GABAPENTIN 100 MG: 100 CAPSULE ORAL at 21:05

## 2019-07-04 RX ADMIN — SUCRALFATE 1 G: 1 TABLET ORAL at 21:04

## 2019-07-04 RX ADMIN — SUCRALFATE 1 G: 1 TABLET ORAL at 08:09

## 2019-07-04 RX ADMIN — LEVOTHYROXINE SODIUM 25 MCG: 25 TABLET ORAL at 08:01

## 2019-07-04 RX ADMIN — PANTOPRAZOLE SODIUM 40 MG: 40 GRANULE, DELAYED RELEASE ORAL at 06:14

## 2019-07-04 RX ADMIN — REGADENOSON 0.4 MG: 0.08 INJECTION, SOLUTION INTRAVENOUS at 10:35

## 2019-07-04 RX ADMIN — OXYBUTYNIN CHLORIDE 5 MG: 5 TABLET ORAL at 13:29

## 2019-07-04 RX ADMIN — FLUPHENAZINE HYDROCHLORIDE 10 MG: 5 TABLET, FILM COATED ORAL at 21:04

## 2019-07-04 RX ADMIN — ALBUTEROL SULFATE 2.5 MG: 2.5 SOLUTION RESPIRATORY (INHALATION) at 16:26

## 2019-07-04 RX ADMIN — TOPIRAMATE 100 MG: 100 TABLET, FILM COATED ORAL at 21:05

## 2019-07-04 RX ADMIN — ARIPIPRAZOLE 30 MG: 15 TABLET ORAL at 08:01

## 2019-07-04 RX ADMIN — CALCIUM CARBONATE (ANTACID) CHEW TAB 500 MG 1000 MG: 500 CHEW TAB at 08:01

## 2019-07-04 RX ADMIN — FAMOTIDINE 20 MG: 20 TABLET ORAL at 08:01

## 2019-07-04 RX ADMIN — BENZTROPINE MESYLATE 0.5 MG: 0.5 TABLET ORAL at 08:01

## 2019-07-04 RX ADMIN — GABAPENTIN 100 MG: 100 CAPSULE ORAL at 13:29

## 2019-07-04 RX ADMIN — FLUPHENAZINE HYDROCHLORIDE 10 MG: 5 TABLET, FILM COATED ORAL at 08:01

## 2019-07-04 RX ADMIN — ALBUTEROL SULFATE 2.5 MG: 2.5 SOLUTION RESPIRATORY (INHALATION) at 06:00

## 2019-07-04 RX ADMIN — Medication 30 MILLICURIE: at 10:33

## 2019-07-04 RX ADMIN — LORAZEPAM 0.5 MG: 0.5 TABLET ORAL at 08:00

## 2019-07-04 RX ADMIN — SUCRALFATE 1 G: 1 TABLET ORAL at 17:13

## 2019-07-04 RX ADMIN — SUCRALFATE 1 G: 1 TABLET ORAL at 13:29

## 2019-07-04 RX ADMIN — ENOXAPARIN SODIUM 40 MG: 40 INJECTION SUBCUTANEOUS at 08:00

## 2019-07-04 RX ADMIN — Medication 10 MILLICURIE: at 08:18

## 2019-07-04 RX ADMIN — ASPIRIN 81 MG 81 MG: 81 TABLET ORAL at 08:01

## 2019-07-04 RX ADMIN — LORAZEPAM 0.5 MG: 0.5 TABLET ORAL at 17:13

## 2019-07-04 RX ADMIN — TOPIRAMATE 100 MG: 100 TABLET, FILM COATED ORAL at 08:01

## 2019-07-04 RX ADMIN — VENLAFAXINE HYDROCHLORIDE 225 MG: 150 CAPSULE, EXTENDED RELEASE ORAL at 08:01

## 2019-07-04 RX ADMIN — BENZTROPINE MESYLATE 0.5 MG: 0.5 TABLET ORAL at 13:29

## 2019-07-04 RX ADMIN — GABAPENTIN 100 MG: 100 CAPSULE ORAL at 08:01

## 2019-07-04 RX ADMIN — BENZALKONIUM CHLORIDE, SODIUM PHOSPHATE, DIBASIC, EDETATE DISODIUM,SODIUM PHOSPHATE, MONOBASIC, SODIUM CHLORIDE, WATER, PHOSPHORIC ACID, SODIUM HYDROXIDE 1 DROP: 14 SOLUTION INTRAOCULAR at 13:32

## 2019-07-04 RX ADMIN — DOCUSATE SODIUM 100 MG: 100 CAPSULE, LIQUID FILLED ORAL at 08:01

## 2019-07-04 RX ADMIN — BENZTROPINE MESYLATE 0.5 MG: 0.5 TABLET ORAL at 21:05

## 2019-07-04 ASSESSMENT — PAIN DESCRIPTION - ORIENTATION: ORIENTATION: RIGHT

## 2019-07-04 ASSESSMENT — PAIN DESCRIPTION - DESCRIPTORS: DESCRIPTORS: ACHING;DISCOMFORT;SORE

## 2019-07-04 ASSESSMENT — PAIN SCALES - GENERAL
PAINLEVEL_OUTOF10: 0
PAINLEVEL_OUTOF10: 0
PAINLEVEL_OUTOF10: 4

## 2019-07-04 ASSESSMENT — PAIN - FUNCTIONAL ASSESSMENT: PAIN_FUNCTIONAL_ASSESSMENT: PREVENTS OR INTERFERES SOME ACTIVE ACTIVITIES AND ADLS

## 2019-07-04 ASSESSMENT — PAIN DESCRIPTION - PAIN TYPE: TYPE: SURGICAL PAIN

## 2019-07-04 ASSESSMENT — PAIN DESCRIPTION - PROGRESSION: CLINICAL_PROGRESSION: GRADUALLY IMPROVING

## 2019-07-04 ASSESSMENT — PAIN DESCRIPTION - ONSET: ONSET: ON-GOING

## 2019-07-04 ASSESSMENT — PAIN DESCRIPTION - FREQUENCY: FREQUENCY: INTERMITTENT

## 2019-07-04 ASSESSMENT — PAIN DESCRIPTION - LOCATION: LOCATION: HAND

## 2019-07-04 NOTE — DISCHARGE INSTR - COC
Continuity of Care Form    Patient Name: Bill Velazco   :  1955  MRN:  [id number]    [facility] date:  7/3/2019  Discharge date:  19    Code Status Order: Prior   Advance Directives:   [facility] Documentation     Date/Time Healthcare Directive Type of Healthcare Directive Copy in [address] Agent's Name Healthcare Agent's Phone Number    19 0033  No, patient does not have an advance directive for healthcare treatment -- -- -- -- --          Admitting Physician:  Emilee Mendez DO  PCP: Anali Kramer    Discharging Nurse: [facility] Unit/Room#: 9699/1786-39  Discharging Unit Phone Number: [phone]    Emergency Contact:   Extended Emergency Contact Information  Primary Emergency Contact: Keke Pandey  [place] Phone: [phone]  Mobile Phone: [phone]  Relation: Other  Secondary Emergency Contact: Abdoulaye Box  Address: [facility] Phone: [phone]  Mobile Phone: [phone]  Relation: Brother/Sister    Past Surgical History:  Past Surgical History:   Procedure Laterality Date    ANESTHESIA NERVE BLOCK Right 2019    RIGHT TRANSFORAMINAL EPIDURAL L4-5 #1 performed by Presley Herndon MD at [place] [address]      lumbar fusion   [address] [place] [address]      multiple   d/t  incontinence    BREAST SURGERY      bx neg lt    COLONOSCOPY      CYST REMOVAL  2006    vulvular sebaceous cyst excision    DIAGNOSTIC CARDIAC CATH LAB PROCEDURE      showed \"mitral valve prolapse\", records not available     ECHO COMPL W DOP COLOR FLOW  10/10/2012         ENDOSCOPY, COLON, DIAGNOSTIC      EPIDURAL STEROID INJECTION Right 2019    RIGHT TRANSFORAMINAL L4 AND L5 EPIDURAL STEROID INJECTION #2 performed by Presley Herndon MD at [facility] Right 2019    FINGER TRIGGER RELEASE Right 3/22/2019    RIGHT HAND RING FINGER TRIGGER FINGER RELEASE performed by Hearing    Nutrition Therapy:  Current Nutrition Therapy:   - Oral Diet:  Cardiac    Routes of Feeding: Oral  Liquids: Thin Liquids  Daily Fluid Restriction: no  Last Modified Barium Swallow with Video (Video Swallowing Test): not done    Treatments at the Time of Hospital Discharge:   Respiratory Treatments: Proventil nebulizer  Oxygen Therapy:  is not on home oxygen therapy. Ventilator:    - No ventilator support    Rehab Therapies: Physical Therapy / Occupational Therapy  Weight Bearing Status/Restrictions: No weight bearing restirctions  Other Medical Equipment (for information only, NOT a DME order):  none  Other Treatments: none    Patient's personal belongings (please select all that are sent with patient):  Marcos    RN SIGNATURE:  Electronically signed by Casi Morgan RN on 7/4/19 at 7:05 PM    CASE MANAGEMENT/SOCIAL WORK SECTION    Inpatient Status Date: ***    Readmission Risk Assessment Score:  Readmission Risk              Risk of Unplanned Readmission:        12           Discharging to Facility/ Agency   · Name:   · Address:  · Phone:  · Fax:    Dialysis Facility (if applicable)   · Name:  · Address:  · Dialysis Schedule:  · Phone:  · Fax:    / signature: {Esignature:[id number]}    PHYSICIAN SECTION    Prognosis: {Prognosis:5161398661}    Condition at Discharge: [facility] Patient Condition:706566143}    Rehab Potential (if transferring to Rehab): {Prognosis:1103018961}    Recommended Labs or Other Treatments After Discharge: ***    Physician Certification: I certify the above information and transfer of Jamir Jo  is necessary for the continuing treatment of the diagnosis listed and that she requires {Admit to Appropriate Level of Care:60004} for {GREATER/LESS:785126528} 30 days.      Update Admission H&P: {P DME Changes in [facility]:687240654}    PHYSICIAN SIGNATURE:  Electronically signed by Nehal Maharaj DO on 7/4/19 at 5:14 PM

## 2019-07-04 NOTE — ED PROVIDER NOTES
signs as below have been reviewed. Patient Vitals for the past 24 hrs:   BP Temp Temp src Pulse Resp SpO2 Height Weight   07/03/19 2132 123/69 -- -- 57 19 95 % -- --   07/03/19 2028 115/76 -- -- 60 19 97 % -- --   07/03/19 1914 128/72 -- -- 64 17 99 % -- --   07/03/19 1801 136/76 98.5 °F (36.9 °C) Oral 65 18 97 % 5' 6\" (1.676 m) 227 lb 5 oz (103.1 kg)       Oxygen Saturation Interpretation: Normal    ------------------------------------------ PROGRESS NOTES ------------------------------------------  Re-evaluation(s):  Time: 6897  Patients symptoms show no change  Repeat physical examination is improved    Counseling:  I have spoken with the patient and discussed todays results, in addition to providing specific details for the plan of care and counseling regarding the diagnosis and prognosis. Their questions are answered at this time and they are agreeable with the plan of admission.    --------------------------------- ADDITIONAL PROVIDER NOTES ---------------------------------  Consultations:  Time: 2228. Spoke with Dr. Rhonda Moseley. Discussed case. They will admit the patient. This patient's ED course included: a personal history and physicial examination and re-evaluation prior to disposition    This patient has remained hemodynamically stable during their ED course. Diagnosis:  1. Chest pain, unspecified type        Disposition:  Patient's disposition: Admit to telemetry  Patient's condition is stable.                  Addy Couch,   07/03/19 4496

## 2019-07-04 NOTE — H&P
INTERNAL MEDICINE H&P  7/3/2019  Name:  Marisela Avelar  :   1955 (79 y.o.)  MRN:   [id number]    Chief Complaint   Patient presents with    Chest Pain     Midsternal chest pain radiating to L arm, 3/10 after 1 nitro, 4 baby ASA given en route. CP X 1 hour. HPI:  The patient is a 62 y.o. female presents with chest pain that started earlier today when she was cleaning up her house. She bent over and had sudden sharp chest pain to her right chest that spread to her left chest.  Nothing made the pain better and nothing made the pain worse. Her pain is well controlled right now. She denies any vision change, nausea, vomiting, abdominal pain, passing out. She has seen Dr. Valeria Levy in the past and thinks it has been several years since she had a cardiac work-up. She is a former smoker. She has not been using her BiPAP for obstructive sleep apnea. DISCHARGE DIAGNOSES 18:   Chest pain, ACS ruled out with negative cardiac enzymes and negative ST  Hx PVD  HLD  COPD  Hx bipolar disorder  Hx seizures  Hx PE with IVC filter   GERD        Stress 2018  Pharmacologic stress myocardial perfusion imaging within   normal limits. Stress induced ischemia is not demonstrated.       FINDINGS: Left ventricular myocardial contractibility was evaluated as   part of SPECT cardiac imaging. Left ventricular chamber size is   normal. Myocardial motion is unremarkable.       IMPRESSION: Unremarkable study.           FINDINGS: SPECT gated images of the left ventricular myocardium were   obtained for purposes of left ventricular ejection fraction   determination.  Left ventricular ejection fraction is calculated at   64%.         IMPRESSION:  Left ventricular ejection fraction calculated at 64%.          ECHO 2018   Left ventricular size is grossly normal.   Moderate left ventricular concentric hypertrophy noted.   Ejection fraction is measured at 53%.   No evidence of left ventricular mass or thrombus Non-medical: None   Tobacco Use    Smoking status: Former Smoker     Years: 25.00     Types: Cigarettes     Last attempt to quit: 2002     Years since quittin.5    Smokeless tobacco: Never Used   Substance and Sexual Activity    Alcohol use: No     Comment: quit . 4 cups coffee per day, 2 glasses tea daily    Drug use: No     Comment: quit in  all the other drugs except marijuana that was 1986    Sexual activity: Never   Lifestyle    Physical activity:     Days per week: None     Minutes per session: None    Stress: None   Relationships    Social connections:     Talks on phone: None     Gets together: None     Attends Buddhism service: None     Active member of club or organization: None     Attends meetings of clubs or organizations: None     Relationship status: None    Intimate partner violence:     Fear of current or ex partner: None     Emotionally abused: None     Physically abused: None     Forced sexual activity: None   Other Topics Concern    None   Social History Narrative    None       Family History:   Family History   Problem Relation Age of Onset    Colon Cancer Father     Cancer Father 77        colon cancer    Breast Cancer Mother     Cancer Mother         lung    Other Mother         mother passed away from low platelettes    Heart Attack Mother     Other Brother         hemiplegic from sepsis of spinal cord    Other Brother         MVA    Breast Cancer Maternal Aunt     Breast Cancer Maternal Aunt        Allergies: Allergies   Allergen Reactions    Penicillins Anaphylaxis    Demerol Other (See Comments)     hypotension    Depakote [Divalproex Sodium]      ?  Haldol [Haloperidol Lactate] Other (See Comments)     shakes    Sulfa Antibiotics Nausea And Vomiting       Medications Prior to Admission:   Prior to Admission medications    Medication Sig Start Date End Date Taking?  Authorizing Provider   meloxicam (MOBIC) 7.5 MG tablet Take 1 tablet by

## 2019-07-05 ASSESSMENT — ENCOUNTER SYMPTOMS
SHORTNESS OF BREATH: 0
COUGH: 0
VOMITING: 0
FACIAL SWELLING: 0

## 2019-07-10 ENCOUNTER — OFFICE VISIT (OUTPATIENT)
Dept: ORTHOPEDIC SURGERY | Age: 64
End: 2019-07-10
Payer: MEDICAID

## 2019-07-10 VITALS — TEMPERATURE: 98 F | BODY MASS INDEX: 35.84 KG/M2 | HEIGHT: 66 IN | WEIGHT: 223 LBS

## 2019-07-10 DIAGNOSIS — M65.341 TRIGGER FINGER, RIGHT RING FINGER: Primary | ICD-10-CM

## 2019-07-10 PROCEDURE — 99212 OFFICE O/P EST SF 10 MIN: CPT | Performed by: NURSE PRACTITIONER

## 2019-07-10 NOTE — PATIENT INSTRUCTIONS
Patient Education        Finger: Exercises  Your Care Instructions  Here are some examples of exercises for your fingers. Start each exercise slowly. Ease off the exercise if you start to have pain. Your doctor or your physical or occupational therapist will tell you when you can start these exercises and which ones will work best for you. How to do the exercises  Tendon sherita    1. In this exercise, the steps follow one another to make a continuous movement. 2. With one hand, point your fingers and thumb straight up. Your wrist should be relaxed, following the line of your fingers and thumb. 3. Curl your fingers so that the top two joints in them are bent, and your fingers wrap down. Your fingertips should touch or be near the base of your fingers. Your fingers will look like a hook. 4. Make a fist by bending your knuckles. Your thumb can gently rest against your index (pointing) finger. 5. Unwind your fingers slightly so that your fingertips can touch the base of your palm. Your thumb can rest against your index finger. Hold that position for about 6 seconds. 6. Move back to your starting position, with your fingers and thumb pointing up. 7. Repeat the series of motions 8 to 12 times. 8. Switch hands, and repeat steps 1 through 6. Thumb flexion/extension    1. Place your forearm and hand on a table with your thumb pointing up. 2. Bend your thumb downward and across your palm so that your thumb touches the base of your little finger. Hold that position for about 6 seconds. Then straighten your thumb. 3. Repeat 8 to 12 times. 4. Switch hands, and repeat steps 1 through 3. Thumb abduction/adduction    1. With one hand, point your fingers and thumb straight up. Your wrist should be relaxed, following the line of your fingers and thumb. 2. Pull your thumb away from your palm as far as you can. Hold that position for about 6 seconds.  Then slowly move your thumb back to the starting position, with

## 2019-07-16 RX ORDER — TRIAMCINOLONE ACETONIDE 40 MG/ML
40 INJECTION, SUSPENSION INTRA-ARTICULAR; INTRAMUSCULAR ONCE
Status: COMPLETED | OUTPATIENT
Start: 2019-07-16 | End: 2019-07-16

## 2019-07-16 RX ADMIN — TRIAMCINOLONE ACETONIDE 40 MG: 40 INJECTION, SUSPENSION INTRA-ARTICULAR; INTRAMUSCULAR at 13:09

## 2019-07-19 ENCOUNTER — OFFICE VISIT (OUTPATIENT)
Dept: NEUROSURGERY | Age: 64
End: 2019-07-19
Payer: MEDICAID

## 2019-07-19 ENCOUNTER — HOSPITAL ENCOUNTER (OUTPATIENT)
Age: 64
Discharge: HOME | End: 2019-07-21
Payer: MEDICAID

## 2019-07-19 ENCOUNTER — HOSPITAL ENCOUNTER (OUTPATIENT)
Dept: GENERAL RADIOLOGY | Age: 64
Discharge: HOME | End: 2019-07-21
Payer: MEDICAID

## 2019-07-19 VITALS
DIASTOLIC BLOOD PRESSURE: 60 MMHG | SYSTOLIC BLOOD PRESSURE: 105 MMHG | HEIGHT: 66 IN | HEART RATE: 89 BPM | BODY MASS INDEX: 35.84 KG/M2 | WEIGHT: 223 LBS

## 2019-07-19 DIAGNOSIS — M51.36 LUMBAR DEGENERATIVE DISC DISEASE: ICD-10-CM

## 2019-07-19 DIAGNOSIS — M51.36 LUMBAR DEGENERATIVE DISC DISEASE: Primary | ICD-10-CM

## 2019-07-19 PROCEDURE — 72100 X-RAY EXAM L-S SPINE 2/3 VWS: CPT

## 2019-07-19 PROCEDURE — 99213 OFFICE O/P EST LOW 20 MIN: CPT | Performed by: PHYSICIAN ASSISTANT

## 2019-08-01 DIAGNOSIS — M51.36 LUMBAR DEGENERATIVE DISC DISEASE: Primary | ICD-10-CM

## 2019-08-13 ENCOUNTER — TELEPHONE (OUTPATIENT)
Dept: NEUROSURGERY | Age: 64
End: 2019-08-13

## 2019-08-21 ENCOUNTER — OFFICE VISIT (OUTPATIENT)
Dept: ORTHOPEDIC SURGERY | Age: 64
End: 2019-08-21
Payer: MEDICAID

## 2019-08-21 VITALS — BODY MASS INDEX: 36.16 KG/M2 | HEIGHT: 66 IN | TEMPERATURE: 98 F | WEIGHT: 225 LBS

## 2019-08-21 DIAGNOSIS — M65.341 TRIGGER FINGER, RIGHT RING FINGER: Primary | ICD-10-CM

## 2019-08-21 PROCEDURE — 99213 OFFICE O/P EST LOW 20 MIN: CPT | Performed by: NURSE PRACTITIONER

## 2019-08-21 RX ORDER — IBUPROFEN 200 MG
TABLET ORAL
COMMUNITY
End: 2019-09-27

## 2019-08-21 RX ORDER — UREA 10 %
LOTION (ML) TOPICAL
COMMUNITY
End: 2019-09-27

## 2019-08-21 RX ORDER — SODIUM CHLORIDE 0.65 %
AEROSOL, SPRAY (ML) NASAL
COMMUNITY

## 2019-08-21 RX ORDER — LUBIPROSTONE 24 UG/1
CAPSULE, GELATIN COATED ORAL
COMMUNITY
End: 2019-09-27

## 2019-08-21 NOTE — PROGRESS NOTES
Mike Levi is here for a post op visit for Her trigger finger release right ring. The patient is doing well, however she continues to have some pain. Current Outpatient Medications   Medication Sig Dispense Refill    calcium carbonate (OS-ALEXANDRA) 1250 (500 Ca) MG chewable tablet Tums 500 500 mg calcium (1,250 mg) chewable tablet   Take 2 tablets 4 times a day by oral route as needed.  ibuprofen (ADVIL;MOTRIN) 200 MG tablet ibuprofen 200 mg tablet   Take 2 tablets 3 times a day by oral route as needed for 14 days.  lubiprostone (AMITIZA) 24 MCG capsule Amitiza 24 mcg capsule   Take 1 capsule every day by oral route.       Melatonin-Pyridoxine 5-1 MG TABS Take by mouth      Propylene Glycol (SYSTANE BALANCE) 0.6 % SOLN Apply to eye      sodium chloride (DEEP SEA NASAL SPRAY) 0.65 % nasal spray by Nasal route      aspirin 81 MG chewable tablet Take 1 tablet by mouth daily 30 tablet 3    triamcinolone (KENALOG) 0.025 % cream Apply Topically once per week to external ears 1 Tube 0    vitamin D (ERGOCALCIFEROL) 15276 units CAPS capsule Take 50,000 Units by mouth once a week      nitroGLYCERIN (NITROSTAT) 0.4 MG SL tablet Place 1 tablet under the tongue every 5 minutes as needed for Chest pain (Patient taking differently: Place 0.4 mg under the tongue every 5 minutes as needed for Chest pain Last used 6-3-19 - sees Dr. Irineo Deluca) 25 tablet 0    loperamide (IMODIUM) 2 MG capsule Take 2 mg by mouth 4 times daily as needed for Diarrhea      acetaminophen (TYLENOL 8 HOUR ARTHRITIS PAIN) 650 MG extended release tablet Take 500 mg by mouth every 8 hours as needed for Pain       pantoprazole sodium (PROTONIX) 40 MG PACK packet Take 40 mg by mouth every morning (before breakfast)      calcium carbonate (TUMS) 500 MG chewable tablet Take 2 tablets by mouth 4 times daily as needed for Heartburn      fluPHENAZine HCl (PROLIXIN) 10 MG tablet Take 10 mg by mouth 2 times daily TAKES AN EXTRA 1/2 TAB AT BEDTIME      Crisaborole 2 % OINT Apply 1 drop topically daily 1 Tube 1    ARIPiprazole (ABILIFY) 30 MG tablet Take 30 mg by mouth daily      metFORMIN (GLUCOPHAGE) 500 MG tablet Take 500 mg by mouth daily (with breakfast)      albuterol sulfate  (90 Base) MCG/ACT inhaler Inhale 2 puffs into the lungs every 6 hours as needed for Wheezing or Shortness of Breath UP TO 4 TIMES A DAY      sucralfate (CARAFATE) 1 GM tablet Take 1 g by mouth 4 times daily      albuterol (PROVENTIL) (2.5 MG/3ML) 0.083% nebulizer solution Take 2.5 mg by nebulization 2 times daily UP TO 4 TIMES PER DAY AS NEEDED FOR COUGH, WHEEZING OR SHORTNESS OF BREATH      LORazepam (ATIVAN) 0.5 MG tablet Take 0.5 mg by mouth 2 times daily.  melatonin 3 MG TABS tablet Take 6 mg by mouth nightly       oxybutynin (DITROPAN) 5 MG tablet Take 5 mg by mouth 3 times daily      venlafaxine (EFFEXOR XR) 150 MG extended release capsule Take 225 mg by mouth daily       polyvinyl alcohol (ARTIFICIAL TEARS) 1.4 % ophthalmic solution Place 1 drop into both eyes 4 times daily       gabapentin (NEURONTIN) 100 MG capsule Take 100 mg by mouth 3 times daily       benztropine (COGENTIN) 0.5 MG tablet Take 0.5 mg by mouth 3 times daily       levothyroxine (SYNTHROID) 25 MCG tablet TAKE 1 TABLET BY MOUTH DAILY. 30 tablet 4    topiramate (TOPAMAX) 100 MG tablet TAKE 1 TABLET BY MOUTH 2 TIMES DAILY. (Patient taking differently: BID) 60 tablet 4    docusate sodium (DOCQLACE) 100 MG capsule TAKE 1 CAPSULE BY MOUTH DAILY. (Patient taking differently: Take 100 mg by mouth daily TAKE 1 CAPSULE BY MOUTH DAILY. ) 30 capsule 3    ranitidine (ZANTAC) 150 MG tablet Take 300 mg by mouth nightly        No current facility-administered medications for this visit.       Past Medical History:   Diagnosis Date    Anxiety     Arthritis     Bipolar 2 disorder (HCC)     Chronic back pain     CKD     Constipation     Continuous urine leakage     COPD (chronic

## 2019-08-23 ENCOUNTER — HOSPITAL ENCOUNTER (OUTPATIENT)
Dept: CT IMAGING | Age: 64
Discharge: HOME | End: 2019-08-25
Payer: MEDICAID

## 2019-08-23 ENCOUNTER — HOSPITAL ENCOUNTER (OUTPATIENT)
Dept: GENERAL RADIOLOGY | Age: 64
Discharge: HOME | End: 2019-08-25
Payer: MEDICAID

## 2019-08-23 ENCOUNTER — TELEPHONE (OUTPATIENT)
Dept: NEUROSURGERY | Age: 64
End: 2019-08-23

## 2019-08-23 VITALS
WEIGHT: 225 LBS | HEIGHT: 66 IN | SYSTOLIC BLOOD PRESSURE: 131 MMHG | BODY MASS INDEX: 36.16 KG/M2 | OXYGEN SATURATION: 97 % | TEMPERATURE: 98.9 F | RESPIRATION RATE: 16 BRPM | HEART RATE: 51 BPM | DIASTOLIC BLOOD PRESSURE: 63 MMHG

## 2019-08-23 DIAGNOSIS — M51.36 LUMBAR DEGENERATIVE DISC DISEASE: ICD-10-CM

## 2019-08-23 DIAGNOSIS — R52 PAIN: ICD-10-CM

## 2019-08-23 LAB
INR BLD: 1.1
PLATELET # BLD: 140 E9/L (ref 130–450)
PROTHROMBIN TIME: 12.3 SEC (ref 9.3–12.4)

## 2019-08-23 PROCEDURE — 72132 CT LUMBAR SPINE W/DYE: CPT

## 2019-08-23 PROCEDURE — 6360000004 HC RX CONTRAST MEDICATION: Performed by: PHYSICIAN ASSISTANT

## 2019-08-23 PROCEDURE — 72265 MYELOGRAPHY L-S SPINE: CPT

## 2019-08-23 PROCEDURE — 36415 COLL VENOUS BLD VENIPUNCTURE: CPT

## 2019-08-23 PROCEDURE — 72120 X-RAY BEND ONLY L-S SPINE: CPT

## 2019-08-23 PROCEDURE — 7100000011 HC PHASE II RECOVERY - ADDTL 15 MIN

## 2019-08-23 PROCEDURE — 62304 MYELOGRAPHY LUMBAR INJECTION: CPT

## 2019-08-23 PROCEDURE — 77003 FLUOROGUIDE FOR SPINE INJECT: CPT

## 2019-08-23 PROCEDURE — 85049 AUTOMATED PLATELET COUNT: CPT

## 2019-08-23 PROCEDURE — 7100000010 HC PHASE II RECOVERY - FIRST 15 MIN

## 2019-08-23 PROCEDURE — 85610 PROTHROMBIN TIME: CPT

## 2019-08-23 RX ADMIN — IOPAMIDOL 13 ML: 408 INJECTION, SOLUTION INTRATHECAL at 10:04

## 2019-08-23 ASSESSMENT — PAIN - FUNCTIONAL ASSESSMENT
PAIN_FUNCTIONAL_ASSESSMENT: 0-10

## 2019-08-23 NOTE — PROGRESS NOTES
0830-Patient arrived ambulatory with walker to Radiology department for lumbar myelogram.     Allergies, home medications, H&P and fasting instructions reviewed with patient. Vital signs taken. Procedural instructions given, questions answered, understanding expressed and consent signed. 0905-Blas Jose PA-C at beside explaining procedure. 0930-Radiology here to take pt. For scan  1020-Pt. Returned from having myelogram. Vitals are stable. Dressing on lower mid back is dry and intact. Pt. Denies pain. 1030-Pt. Sitting up eating and drinking. 1055-Pt. Has HR of 47, pt. Denies any pain or lightheadness/dizziness/nausea. Yamil Whitmore notified and at bedside. Pt. Sitting up drinking coffee talking to friend at bedside. 1110-Pt. HR up to 51. Pt. Sitting up in bed. Pt. Denies symptoms of Nausea or lightheadedness. Pt. Drank some coffee. Pt. [facility] for d/c. Instructions given and explained. Pt. Jacquelynne Schaumann understanding. 1125-Pt. Released from radiology via wheelchair with friend at side.

## 2019-08-24 NOTE — H&P
IR Pre-Operative History and Physical  LUMBAR PUNCTURE W/CONTRAST INJECTION  FOR LUMBAR CT MYELOGRAM      DIAGNOSIS:    Patient Active Problem List   Diagnosis    GERD (gastroesophageal reflux disease)    Depression    Hypothyroid    Schizophrenia ([facility] [address]    Bipolar 2 disorder ([facility])    Overactive bladder    Menopausal symptoms    Acid reflux    Continuous urine leakage    Environmental allergies    DJD (degenerative joint disease) of knee    PAD (peripheral artery disease)    Hyperlipemia    Medial meniscus tear    Chest pain    Synovitis    Lateral meniscal tear    Obesity (BMI 35.0-39.9 without comorbidity)    Nasal obstruction    Jocelyn bullosa    History of bloody stools    Lateral meniscus tear    Aseptic loosening of prosthetic hip ([facility])    Moderate obesity    Mitral prolapse    Ventricular ectopy    Primary osteoarthritis of left knee    Status post total hip replacement, right    Primary osteoarthritis of right hip    Spondylolisthesis at L3-L4 level    Degenerative lumbar spinal stenosis    Type 2 diabetes mellitus, without long-term current use of insulin ([facility])    Lumbar facet arthropathy    Lumbar radiculopathy    Lumbar spondylosis    Primary osteoarthritis of both hips    Chronic pain of left knee    Chronic pain of right knee    Chronic pain syndrome    Trigger ring finger of right hand    Atypical chest pain       Active Problems:    * No active hospital problems. *      PROCEDURE:  Lumbar puncture with contrast injection for lumbar CT Myelogram.    Allergies:  Penicillins; Demerol; Depakote [divalproex sodium]; Haldol [haloperidol lactate]; and Sulfa antibiotics    PHYSICAL EXAM:      Constitutional:  Awake and alert. cooperative.     Heart:  regular rhythm    Lungs:  Clear     Abdomen:  normal      DATA:  CBC:   Lab Results   Component Value Date    WBC 7.8 07/04/2019    RBC 4.49 07/04/2019    HGB 13.5 07/04/2019    HCT 40.2 07/04/2019    MCV 89.5 07/04/2019

## 2019-08-26 ENCOUNTER — TELEPHONE (OUTPATIENT)
Dept: NEUROSURGERY | Age: 64
End: 2019-08-26

## 2019-08-27 ENCOUNTER — HOSPITAL ENCOUNTER (OUTPATIENT)
Age: 64
Discharge: HOME | End: 2019-08-29
Payer: MEDICAID

## 2019-08-27 ENCOUNTER — OFFICE VISIT (OUTPATIENT)
Dept: CARDIOLOGY CLINIC | Age: 64
End: 2019-08-27
Payer: MEDICAID

## 2019-08-27 ENCOUNTER — HOSPITAL ENCOUNTER (OUTPATIENT)
Dept: GENERAL RADIOLOGY | Age: 64
Discharge: HOME | End: 2019-08-29
Payer: MEDICAID

## 2019-08-27 VITALS
WEIGHT: 222 LBS | DIASTOLIC BLOOD PRESSURE: 60 MMHG | HEART RATE: 72 BPM | HEIGHT: 66 IN | SYSTOLIC BLOOD PRESSURE: 118 MMHG | BODY MASS INDEX: 35.68 KG/M2

## 2019-08-27 DIAGNOSIS — Z86.711 HISTORY OF PULMONARY EMBOLUS (PE): ICD-10-CM

## 2019-08-27 DIAGNOSIS — M54.50 CHRONIC MIDLINE LOW BACK PAIN WITHOUT SCIATICA: ICD-10-CM

## 2019-08-27 DIAGNOSIS — I34.0 NON-RHEUMATIC MITRAL REGURGITATION: ICD-10-CM

## 2019-08-27 DIAGNOSIS — R06.02 SOB (SHORTNESS OF BREATH): ICD-10-CM

## 2019-08-27 DIAGNOSIS — Z86.79 HISTORY OF SINUS BRADYCARDIA: ICD-10-CM

## 2019-08-27 DIAGNOSIS — J45.20 MILD INTERMITTENT ASTHMA WITHOUT COMPLICATION: ICD-10-CM

## 2019-08-27 DIAGNOSIS — E66.9 NON MORBID OBESITY: ICD-10-CM

## 2019-08-27 DIAGNOSIS — R07.9 CHEST PAIN, UNSPECIFIED TYPE: Primary | ICD-10-CM

## 2019-08-27 DIAGNOSIS — R06.09 DOE (DYSPNEA ON EXERTION): ICD-10-CM

## 2019-08-27 DIAGNOSIS — G89.29 CHRONIC MIDLINE LOW BACK PAIN WITHOUT SCIATICA: ICD-10-CM

## 2019-08-27 PROCEDURE — 71046 X-RAY EXAM CHEST 2 VIEWS: CPT

## 2019-08-27 PROCEDURE — 93000 ELECTROCARDIOGRAM COMPLETE: CPT | Performed by: INTERNAL MEDICINE

## 2019-08-27 PROCEDURE — 99214 OFFICE O/P EST MOD 30 MIN: CPT | Performed by: INTERNAL MEDICINE

## 2019-08-27 NOTE — PROGRESS NOTES
tablet, TAKE 1 TABLET BY MOUTH 2 TIMES DAILY. (Patient taking differently: BID), Disp: 60 tablet, Rfl: 4    docusate sodium (DOCQLACE) 100 MG capsule, TAKE 1 CAPSULE BY MOUTH DAILY. (Patient taking differently: Take 100 mg by mouth daily TAKE 1 CAPSULE BY MOUTH DAILY.), Disp: 30 capsule, Rfl: 3    ranitidine (ZANTAC) 150 MG tablet, Take 300 mg by mouth nightly , Disp: , Rfl:       S: REASON FOR VISIT:       Chief Complaint   Patient presents with    Chest Pain     6 month. Patient states she has had chest pain which states in the chest area. Patient has sob on asthma. History of Present Illness:       Office Visit for follow up of VHD,  PVCs, bradycardia   Had Myelogram few days ago   No hospitalizations or surgeries since last visit   C/o occ mid sternal CP and left breast pain ' like shooting pain\" no radiation, no associated Sx, lasts few sec; relieve on its own   C/o miild short of breath, chronic, No PND or orthopnea-Sleeps on 1 pillow   No palpitations, dizzy or syncope.    Active at home   No orthopnea   Try to watch diet   Compliant with all medications       Past Medical History:   Diagnosis Date    Anxiety     Arthritis     Bipolar 2 disorder (HCC)     Chronic back pain     CKD     Constipation     Continuous urine leakage     COPD (chronic obstructive pulmonary disease) (HCC)     no oxygen use, BIpap at night     Depression     Dysphagia     GERD (gastroesophageal reflux disease)     History of cardiovascular stress test 10-    LEXISCAN    History of cardiovascular stress test 06/2014    lexiscan stress    History of cardiovascular stress test 05/22/2018    Lexiscan stress test    Hx of cardiovascular stress test 07/04/2019    Lexiscan stress test    Hyperlipidemia     MDRO (multiple drug resistant organisms) resistance     mrsa 3-4 yrs ago with back surgery (2015)    Menopausal symptoms     atrophic vaginitis    Mitral valve prolapse     Nausea & vomiting     with chrondroplasty partial synovectomy     VA ARTHDSIS POST/POSTERLATRL/POSTINTRBDYADL SPC/SEG N/A 2018    L3-4M L4-5, L5-S1 POSTERIOR LUMBAR INTERBODY FUSION --OARM, AUDIOLOGY, CAGES, PLATES, SCREWS, CRISS, FLORENTIN TABLE, CELL SAVER, PLATELET GEL, MEDTRONIC performed by Ladonna Ortega MD at [address]            Family History   Problem Relation Age of Onset    Colon Cancer Father     Cancer Father 77        colon cancer    Breast Cancer Mother     Cancer Mother         lung    Other Mother         mother passed away from low platelettes    Heart Attack Mother     Other Brother         hemiplegic from sepsis of spinal cord    Other Brother         MVA    Breast Cancer Maternal Aunt     Breast Cancer Maternal Aunt           Social History     Tobacco Use    Smoking status: Former Smoker     Years: 25.00     Types: Cigarettes     Last attempt to quit: 2002     Years since quittin.6    Smokeless tobacco: Never Used   Substance Use Topics    Alcohol use: No     Comment: quit . 4 cups coffee per day, 2 glasses tea daily         Review of Systems:  HEENT: negative for acute visual symptoms or auditory problems, no dysphagia  Constitutional: negative for fever and chills, or significant weight loss  Respiratory: negative for cough, wheezing, or hemoptysis  Cardiovascular: +ve rest chest pain +ve exertional dyspnea  Gastrointestinal: negative for abdominal pain, diarrhea, nausea and vomiting  Endocrine: Negative for polyuria and polydyspsia  Genitourinary:negative for dysuria and hematuria  Derm: negative for rash and skin lesion(s)  Neurological: negative for tingling, numbness, weakness, seizures and tremors  Endocrine: negative for polydipsia and polyuria  Musculoskeletal: +ve back pain  Psychiatric: negative for anxiety, depression, or suicidal ideations         O:  COMPLETE PHYSICAL EXAM:       /60

## 2019-08-29 ENCOUNTER — OFFICE VISIT (OUTPATIENT)
Dept: NEUROSURGERY | Age: 64
End: 2019-08-29
Payer: MEDICAID

## 2019-08-29 VITALS
HEIGHT: 66 IN | BODY MASS INDEX: 36 KG/M2 | HEART RATE: 72 BPM | DIASTOLIC BLOOD PRESSURE: 73 MMHG | SYSTOLIC BLOOD PRESSURE: 132 MMHG | WEIGHT: 224 LBS

## 2019-08-29 DIAGNOSIS — M96.1 POST LAMINECTOMY SYNDROME: Primary | ICD-10-CM

## 2019-08-29 PROCEDURE — 99213 OFFICE O/P EST LOW 20 MIN: CPT | Performed by: PHYSICIAN ASSISTANT

## 2019-09-03 ENCOUNTER — PREP FOR PROCEDURE (OUTPATIENT)
Dept: PAIN MANAGEMENT | Age: 64
End: 2019-09-03

## 2019-09-03 ENCOUNTER — OFFICE VISIT (OUTPATIENT)
Dept: PAIN MANAGEMENT | Age: 64
End: 2019-09-03
Payer: MEDICAID

## 2019-09-03 VITALS
DIASTOLIC BLOOD PRESSURE: 66 MMHG | HEIGHT: 66 IN | WEIGHT: 223 LBS | BODY MASS INDEX: 35.84 KG/M2 | RESPIRATION RATE: 16 BRPM | TEMPERATURE: 97.9 F | HEART RATE: 72 BPM | SYSTOLIC BLOOD PRESSURE: 110 MMHG | OXYGEN SATURATION: 95 %

## 2019-09-03 DIAGNOSIS — M25.562 CHRONIC PAIN OF LEFT KNEE: ICD-10-CM

## 2019-09-03 DIAGNOSIS — G89.4 CHRONIC PAIN SYNDROME: ICD-10-CM

## 2019-09-03 DIAGNOSIS — M25.561 CHRONIC PAIN OF RIGHT KNEE: ICD-10-CM

## 2019-09-03 DIAGNOSIS — M47.816 LUMBAR SPONDYLOSIS: ICD-10-CM

## 2019-09-03 DIAGNOSIS — G89.29 CHRONIC PAIN OF RIGHT KNEE: ICD-10-CM

## 2019-09-03 DIAGNOSIS — M16.11 PRIMARY OSTEOARTHRITIS OF RIGHT HIP: ICD-10-CM

## 2019-09-03 DIAGNOSIS — M54.16 LUMBAR RADICULOPATHY: ICD-10-CM

## 2019-09-03 DIAGNOSIS — G89.29 CHRONIC PAIN OF LEFT KNEE: ICD-10-CM

## 2019-09-03 DIAGNOSIS — M47.816 LUMBAR FACET ARTHROPATHY: Primary | ICD-10-CM

## 2019-09-03 DIAGNOSIS — M16.0 PRIMARY OSTEOARTHRITIS OF BOTH HIPS: ICD-10-CM

## 2019-09-03 DIAGNOSIS — M43.16 SPONDYLOLISTHESIS AT L3-L4 LEVEL: ICD-10-CM

## 2019-09-03 DIAGNOSIS — M17.12 PRIMARY OSTEOARTHRITIS OF LEFT KNEE: ICD-10-CM

## 2019-09-03 DIAGNOSIS — Z96.641 STATUS POST TOTAL HIP REPLACEMENT, RIGHT: ICD-10-CM

## 2019-09-03 PROCEDURE — 99213 OFFICE O/P EST LOW 20 MIN: CPT | Performed by: NURSE PRACTITIONER

## 2019-09-03 RX ORDER — ACETAMINOPHEN AND CODEINE PHOSPHATE 300; 30 MG/1; MG/1
1 TABLET ORAL DAILY PRN
Qty: 15 TABLET | Refills: 0 | Status: SHIPPED | OUTPATIENT
Start: 2019-09-03 | End: 2019-09-27 | Stop reason: SDUPTHER

## 2019-09-03 NOTE — PROGRESS NOTES
identified. ;Assessed functional status. ;Obtaining appropriate analgesic effect of treatment.    Chronic Pain > 80 MEDD -       Past Medical History:   Diagnosis Date    Anxiety     Arthritis     Bipolar 2 disorder (HCC)     Chronic back pain     CKD     Constipation     Continuous urine leakage     COPD [facility]chronic obstructive [facility] disease) ([facility])     no oxygen use, BIpap at night     Depression     Dysphagia     GERD (gastroesophageal reflux disease)     History of cardiovascular stress test 10-    LEXISCAN    History of cardiovascular stress test 06/2014    lexiscan stress    History of cardiovascular stress test 05/22/2018    Lexiscan stress test    Hx of cardiovascular stress test 07/04/2019    Lexiscan stress test    Hyperlipidemia     MDRO (multiple drug resistant organisms) resistance     mrsa 3-4 yrs ago with back surgery (2015)    Menopausal symptoms     atrophic vaginitis    Mitral valve prolapse     Nausea & vomiting     with anesthesia    Neurogenic bladder     Obstructive sleep apnea     uses cpap    EMILEE treated with BiPAP     Overactive bladder     Pulmonary embolism (HCC)     few yrs ago has vena cava filter    PVD (peripheral vascular disease) ([address]     Schizoaffective disorder ([facility])     Seizure disorder ([facility])     stares- Last one 1-2019     Thyroid disease     x30 years    Type 2 diabetes mellitus, without long-term current use of insulin ([address] 7/16/2018       Past Surgical History:   Procedure Laterality Date    ANESTHESIA NERVE BLOCK Right 5/20/2019    RIGHT TRANSFORAMINAL EPIDURAL L4-5 #[facility]      lum[facility] fusion    BLADDER SURGERY      multiple   d/t  incontinence    BREAST SURGERY      bx neg lt    COLONOSCOPY      CYST REMOVAL  2006    vulvular sebaceous cyst excision    DIAGNOSTIC CARDIAC CATH LAB PROCEDURE  1996/1997    showed \"mitral valve prolapse\", records not

## 2019-09-27 ENCOUNTER — OFFICE VISIT (OUTPATIENT)
Dept: PAIN MANAGEMENT | Age: 64
End: 2019-09-27
Payer: MEDICAID

## 2019-09-27 VITALS
HEIGHT: 66 IN | TEMPERATURE: 98.3 F | OXYGEN SATURATION: 97 % | RESPIRATION RATE: 18 BRPM | HEART RATE: 66 BPM | WEIGHT: 223 LBS | SYSTOLIC BLOOD PRESSURE: 122 MMHG | BODY MASS INDEX: 35.84 KG/M2 | DIASTOLIC BLOOD PRESSURE: 70 MMHG

## 2019-09-27 DIAGNOSIS — M17.12 PRIMARY OSTEOARTHRITIS OF LEFT KNEE: Primary | ICD-10-CM

## 2019-09-27 DIAGNOSIS — M25.561 CHRONIC PAIN OF RIGHT KNEE: ICD-10-CM

## 2019-09-27 DIAGNOSIS — M16.11 PRIMARY OSTEOARTHRITIS OF RIGHT HIP: ICD-10-CM

## 2019-09-27 DIAGNOSIS — M16.0 PRIMARY OSTEOARTHRITIS OF BOTH HIPS: ICD-10-CM

## 2019-09-27 DIAGNOSIS — M47.816 LUMBAR SPONDYLOSIS: ICD-10-CM

## 2019-09-27 DIAGNOSIS — Z96.641 STATUS POST TOTAL HIP REPLACEMENT, RIGHT: ICD-10-CM

## 2019-09-27 DIAGNOSIS — G89.29 CHRONIC PAIN OF RIGHT KNEE: ICD-10-CM

## 2019-09-27 DIAGNOSIS — G89.4 CHRONIC PAIN SYNDROME: ICD-10-CM

## 2019-09-27 DIAGNOSIS — M54.16 LUMBAR RADICULOPATHY: ICD-10-CM

## 2019-09-27 DIAGNOSIS — G89.29 CHRONIC PAIN OF LEFT KNEE: ICD-10-CM

## 2019-09-27 DIAGNOSIS — M25.562 CHRONIC PAIN OF LEFT KNEE: ICD-10-CM

## 2019-09-27 DIAGNOSIS — M47.816 LUMBAR FACET ARTHROPATHY: ICD-10-CM

## 2019-09-27 DIAGNOSIS — M43.16 SPONDYLOLISTHESIS AT L3-L4 LEVEL: ICD-10-CM

## 2019-09-27 PROCEDURE — 99214 OFFICE O/P EST MOD 30 MIN: CPT | Performed by: PAIN MEDICINE

## 2019-09-27 PROCEDURE — 99213 OFFICE O/P EST LOW 20 MIN: CPT | Performed by: PAIN MEDICINE

## 2019-09-27 RX ORDER — ACETAMINOPHEN AND CODEINE PHOSPHATE 300; 30 MG/1; MG/1
1 TABLET ORAL DAILY PRN
Qty: 15 TABLET | Refills: 0 | Status: SHIPPED | OUTPATIENT
Start: 2019-10-02 | End: 2019-10-21 | Stop reason: SDUPTHER

## 2019-09-27 NOTE — PROGRESS NOTES
to 30 days. 9/3/19 10/3/19  JAM Alvarez CNP   calcium carbonate (OS-ALEXANDRA) 1250 (500 Ca) MG chewable tablet Tums 500 500 mg calcium (1,250 mg) chewable tablet   Take 2 tablets 4 times a day by oral route as needed. Historical Provider, MD   ibuprofen (ADVIL;MOTRIN) 200 MG tablet ibuprofen 200 mg tablet   Take 2 tablets 3 times a day by oral route as needed for 14 days. Historical Provider, MD   lubiprostone (AMITIZA) 24 MCG capsule Amitiza 24 mcg capsule   Take 1 capsule every day by oral route.     Historical Provider, MD   Melatonin-Pyridoxine 5-1 MG TABS Take by mouth    Historical Provider, MD   Propylene Glycol (SYSTANE BALANCE) 0.6 % SOLN Apply to eye    Historical Provider, MD   sodium chloride (DEEP SEA NASAL SPRAY) 0.65 % nasal spray by Nasal route    Historical Provider, MD   aspirin 81 MG chewable tablet Take 1 tablet by mouth daily 7/5/19   Jena Johnson DO   triamcinolone (KENALOG) 0.025 % cream Apply Topically once per week to external ears 6/19/19   Zeus Queen DO   vitamin D (ERGOCALCIFEROL) 65023 units CAPS capsule Take 50,000 Units by mouth once a week    Historical Provider, MD   nitroGLYCERIN (NITROSTAT) 0.4 MG SL tablet Place 1 tablet under the tongue every 5 minutes as needed for Chest pain  Patient taking differently: Place 0.4 mg under the tongue every 5 minutes as needed for Chest pain Last used 6-3-19 - sees Dr. Darius Ocasio 12/3/18   JMA Healy CNP   loperamide (IMODIUM) 2 MG capsule Take 2 mg by mouth 4 times daily as needed for Diarrhea    Historical Provider, MD   acetaminophen (TYLENOL 8 HOUR ARTHRITIS PAIN) 650 MG extended release tablet Take 500 mg by mouth every 8 hours as needed for Pain     Historical Provider, MD   pantoprazole sodium (PROTONIX) 40 MG PACK packet Take 40 mg by mouth every morning (before breakfast)    Historical Provider, MD   calcium carbonate (TUMS) 500 MG chewable tablet Take 2 tablets by mouth 4 times daily as needed for Heartburn left, sitting      Extremities:     Tremors:None bilaterally upper and lower  Range of motion:pain with internal rotation of hips positive Left  Intact:Yes  Edema:none     Neurological:     Sensory:diminished to light touch bilateral legs  Motor:            Right Quadriceps5/5          Left Quadriceps5/5           Right Gastrocnemius5/5    Left Gastrocnemius5/5  Right Ant Tibialis5/5  Left Ant Tibialis5/5  Reflexes:    Right Quadriceps reflex1+  Left Quadriceps reflex2+  Right Achilles reflex0  Left Achilles reflex0  Gait:antalgic with a walker     Dermatology:     Skin:multiple scars Left forearm      Impression:  Chronic low back pain s/p L3-S1 posterior fusion with h/o comorbid psychiatric disorders and seizure disorder  Lumbar spine MRI 04/2018 reviewed   Patient saw Dr. Damian Thomas, CT myelogram without significant stenosis, recommended SCS trail. Plan:  Office extension for her low back pain with radiation to the Right lower extremity, last seen by 11/2018 and to discuss SCS trial   Reviewed most recent imaging studies and discussed with her friend and the patient   Patient is scheduled for right L5 and S1 TFESI next month 10/7  Will hold off on SCS trial for now   Continue with  for her knee  Continue Gabapentin/Effexor and Topamax per psychiatry  Continue with OTC pain medications for mild to moderate pain  Continue Tylenol #3 daily #15 for pain PRN severe days  [facility] report reviewed  Patient encouraged to stay active as tolerated and to lose weight  Treatment plan discussed with the patient including medications and procedure side effects       ccreferring gabrielle Champagne M.D.

## 2019-09-27 NOTE — PROGRESS NOTES
Leonard Harrison presents to the [facility] on 9/27/2019. Eloy Daniel is complaining of pain in her right hip, low back and bilateral lower extremities. The pain is constant. The pain is described as aching, dull and sharp. Pain is rated on her best day at a 2, on her worst day at a 8, and on average at a 5 on the VAS scale. She took her last dose of Tylenol with codeine daily, patient unsure when her last dose was given to her. .        Any procedures since your last visit: No, with  % relief. She is  on NSAIDS and  is  on anticoagulation medications to include ASA and is managed by PCP. Pacemaker or defibrilator: No Physician managing device is . /70   Pulse 66   Temp 98.3 °F (36.8 °C)   Resp 18   Ht 5' 6\" (1.676 m)   Wt 223 lb (101.2 kg)   SpO2 97%   BMI 35.99 kg/m²      No LMP recorded. Patient has had a hysterectomy.

## 2019-10-03 ENCOUNTER — TELEPHONE (OUTPATIENT)
Dept: PAIN MANAGEMENT | Age: 64
End: 2019-10-03

## 2019-10-03 RX ORDER — FLUTICASONE PROPIONATE 50 MCG
1 SPRAY, SUSPENSION (ML) NASAL DAILY
COMMUNITY
End: 2019-10-21 | Stop reason: ALTCHOICE

## 2019-10-07 ENCOUNTER — HOSPITAL ENCOUNTER (OUTPATIENT)
Dept: OPERATING ROOM | Age: 64
Setting detail: OUTPATIENT SURGERY
Discharge: HOME | End: 2019-10-07
Attending: PAIN MEDICINE
Payer: MEDICAID

## 2019-10-07 ENCOUNTER — HOSPITAL ENCOUNTER (OUTPATIENT)
Age: 64
Setting detail: OUTPATIENT SURGERY
Discharge: HOME | End: 2019-10-07
Attending: PAIN MEDICINE | Admitting: PAIN MEDICINE
Payer: MEDICAID

## 2019-10-07 VITALS
HEART RATE: 66 BPM | RESPIRATION RATE: 16 BRPM | DIASTOLIC BLOOD PRESSURE: 65 MMHG | SYSTOLIC BLOOD PRESSURE: 112 MMHG | OXYGEN SATURATION: 97 %

## 2019-10-07 DIAGNOSIS — M51.9 LUMBAR DISC DISEASE: ICD-10-CM

## 2019-10-07 PROCEDURE — 2709999900 HC NON-CHARGEABLE SUPPLY: Performed by: PAIN MEDICINE

## 2019-10-07 PROCEDURE — 3600000005 HC SURGERY LEVEL 5 BASE: Performed by: PAIN MEDICINE

## 2019-10-07 PROCEDURE — 7100000011 HC PHASE II RECOVERY - ADDTL 15 MIN: Performed by: PAIN MEDICINE

## 2019-10-07 PROCEDURE — 64483 NJX AA&/STRD TFRM EPI L/S 1: CPT | Performed by: PAIN MEDICINE

## 2019-10-07 PROCEDURE — 6360000002 HC RX W HCPCS: Performed by: PAIN MEDICINE

## 2019-10-07 PROCEDURE — 2500000003 HC RX 250 WO HCPCS: Performed by: PAIN MEDICINE

## 2019-10-07 PROCEDURE — 6360000004 HC RX CONTRAST MEDICATION: Performed by: PAIN MEDICINE

## 2019-10-07 PROCEDURE — 3209999900 FLUORO FOR SURGICAL PROCEDURES

## 2019-10-07 PROCEDURE — 64484 NJX AA&/STRD TFRM EPI L/S EA: CPT | Performed by: PAIN MEDICINE

## 2019-10-07 PROCEDURE — 7100000010 HC PHASE II RECOVERY - FIRST 15 MIN: Performed by: PAIN MEDICINE

## 2019-10-07 RX ORDER — LIDOCAINE HYDROCHLORIDE 5 MG/ML
INJECTION, SOLUTION INFILTRATION; INTRAVENOUS PRN
Status: DISCONTINUED | OUTPATIENT
Start: 2019-10-07 | End: 2019-10-07 | Stop reason: ALTCHOICE

## 2019-10-07 ASSESSMENT — PAIN DESCRIPTION - DESCRIPTORS
DESCRIPTORS: ACHING;DISCOMFORT
DESCRIPTORS: ACHING;DISCOMFORT
DESCRIPTORS: DISCOMFORT

## 2019-10-07 ASSESSMENT — PAIN DESCRIPTION - FREQUENCY
FREQUENCY: CONTINUOUS
FREQUENCY: CONTINUOUS

## 2019-10-07 ASSESSMENT — PAIN DESCRIPTION - LOCATION
LOCATION: BACK
LOCATION: BACK

## 2019-10-07 ASSESSMENT — PAIN DESCRIPTION - ORIENTATION
ORIENTATION: RIGHT;LOWER
ORIENTATION: RIGHT;LOWER

## 2019-10-07 ASSESSMENT — PAIN DESCRIPTION - PAIN TYPE
TYPE: CHRONIC PAIN;SURGICAL PAIN
TYPE: CHRONIC PAIN;SURGICAL PAIN

## 2019-10-07 ASSESSMENT — PAIN - FUNCTIONAL ASSESSMENT: PAIN_FUNCTIONAL_ASSESSMENT: 0-10

## 2019-10-07 ASSESSMENT — PAIN SCALES - GENERAL
PAINLEVEL_OUTOF10: 3
PAINLEVEL_OUTOF10: 3

## 2019-10-15 ENCOUNTER — TELEPHONE (OUTPATIENT)
Dept: PAIN MANAGEMENT | Age: 64
End: 2019-10-15

## 2019-10-15 DIAGNOSIS — M17.12 PRIMARY OSTEOARTHRITIS OF LEFT KNEE: ICD-10-CM

## 2019-10-15 DIAGNOSIS — G89.4 CHRONIC PAIN SYNDROME: ICD-10-CM

## 2019-10-15 DIAGNOSIS — M16.0 PRIMARY OSTEOARTHRITIS OF BOTH HIPS: Primary | ICD-10-CM

## 2019-10-21 ENCOUNTER — OFFICE VISIT (OUTPATIENT)
Dept: ORTHOPEDIC SURGERY | Age: 64
End: 2019-10-21
Payer: MEDICAID

## 2019-10-21 ENCOUNTER — OFFICE VISIT (OUTPATIENT)
Dept: PAIN MANAGEMENT | Age: 64
End: 2019-10-21
Payer: MEDICAID

## 2019-10-21 VITALS
OXYGEN SATURATION: 97 % | DIASTOLIC BLOOD PRESSURE: 68 MMHG | BODY MASS INDEX: 35.84 KG/M2 | HEART RATE: 63 BPM | WEIGHT: 223 LBS | TEMPERATURE: 97.6 F | SYSTOLIC BLOOD PRESSURE: 115 MMHG | HEIGHT: 66 IN | RESPIRATION RATE: 16 BRPM

## 2019-10-21 VITALS — HEIGHT: 66 IN | WEIGHT: 223 LBS | BODY MASS INDEX: 35.84 KG/M2

## 2019-10-21 DIAGNOSIS — M47.816 LUMBAR FACET ARTHROPATHY: Primary | ICD-10-CM

## 2019-10-21 DIAGNOSIS — M47.816 LUMBAR SPONDYLOSIS: ICD-10-CM

## 2019-10-21 DIAGNOSIS — M54.16 LUMBAR RADICULOPATHY: ICD-10-CM

## 2019-10-21 DIAGNOSIS — G89.29 CHRONIC PAIN OF LEFT KNEE: ICD-10-CM

## 2019-10-21 DIAGNOSIS — M25.561 CHRONIC PAIN OF RIGHT KNEE: ICD-10-CM

## 2019-10-21 DIAGNOSIS — Z96.641 STATUS POST TOTAL HIP REPLACEMENT, RIGHT: ICD-10-CM

## 2019-10-21 DIAGNOSIS — M65.341 TRIGGER FINGER, RIGHT RING FINGER: Primary | ICD-10-CM

## 2019-10-21 DIAGNOSIS — M17.12 PRIMARY OSTEOARTHRITIS OF LEFT KNEE: ICD-10-CM

## 2019-10-21 DIAGNOSIS — M43.16 SPONDYLOLISTHESIS AT L3-L4 LEVEL: ICD-10-CM

## 2019-10-21 DIAGNOSIS — M25.562 CHRONIC PAIN OF LEFT KNEE: ICD-10-CM

## 2019-10-21 DIAGNOSIS — G89.4 CHRONIC PAIN SYNDROME: ICD-10-CM

## 2019-10-21 DIAGNOSIS — G89.29 CHRONIC PAIN OF RIGHT KNEE: ICD-10-CM

## 2019-10-21 DIAGNOSIS — M16.0 PRIMARY OSTEOARTHRITIS OF BOTH HIPS: ICD-10-CM

## 2019-10-21 DIAGNOSIS — M16.11 PRIMARY OSTEOARTHRITIS OF RIGHT HIP: ICD-10-CM

## 2019-10-21 PROCEDURE — 99212 OFFICE O/P EST SF 10 MIN: CPT | Performed by: ORTHOPAEDIC SURGERY

## 2019-10-21 PROCEDURE — 99213 OFFICE O/P EST LOW 20 MIN: CPT | Performed by: NURSE PRACTITIONER

## 2019-10-21 RX ORDER — ACETAMINOPHEN AND CODEINE PHOSPHATE 300; 30 MG/1; MG/1
1 TABLET ORAL DAILY PRN
Qty: 15 TABLET | Refills: 0 | Status: SHIPPED | OUTPATIENT
Start: 2019-10-21 | End: 2019-10-21 | Stop reason: ALTCHOICE

## 2019-10-29 ENCOUNTER — OFFICE VISIT (OUTPATIENT)
Dept: ORTHOPEDIC SURGERY | Age: 64
End: 2019-10-29
Payer: MEDICAID

## 2019-10-29 DIAGNOSIS — M17.12 PRIMARY OSTEOARTHRITIS OF LEFT KNEE: ICD-10-CM

## 2019-10-29 DIAGNOSIS — M17.11 PRIMARY OSTEOARTHRITIS OF RIGHT KNEE: Primary | ICD-10-CM

## 2019-10-29 PROCEDURE — 20610 DRAIN/INJ JOINT/BURSA W/O US: CPT | Performed by: ORTHOPAEDIC SURGERY

## 2019-10-29 RX ORDER — HYALURONATE SODIUM 10 MG/ML
20 SYRINGE (ML) INTRAARTICULAR ONCE
Status: COMPLETED | OUTPATIENT
Start: 2019-10-29 | End: 2019-10-29

## 2019-10-29 RX ADMIN — Medication 20 MG: at 14:09

## 2019-11-05 ENCOUNTER — OFFICE VISIT (OUTPATIENT)
Dept: ORTHOPEDIC SURGERY | Age: 64
End: 2019-11-05
Payer: MEDICAID

## 2019-11-05 DIAGNOSIS — M17.12 PRIMARY OSTEOARTHRITIS OF LEFT KNEE: ICD-10-CM

## 2019-11-05 DIAGNOSIS — M17.11 PRIMARY OSTEOARTHRITIS OF RIGHT KNEE: Primary | ICD-10-CM

## 2019-11-05 PROCEDURE — 20610 DRAIN/INJ JOINT/BURSA W/O US: CPT | Performed by: ORTHOPAEDIC SURGERY

## 2019-11-05 RX ORDER — HYALURONATE SODIUM 10 MG/ML
20 SYRINGE (ML) INTRAARTICULAR ONCE
Status: COMPLETED | OUTPATIENT
Start: 2019-11-05 | End: 2019-11-05

## 2019-11-05 RX ADMIN — Medication 20 MG: at 15:21

## 2019-11-05 RX ADMIN — Medication 20 MG: at 15:22

## 2019-11-12 ENCOUNTER — OFFICE VISIT (OUTPATIENT)
Dept: ORTHOPEDIC SURGERY | Age: 64
End: 2019-11-12
Payer: MEDICAID

## 2019-11-12 DIAGNOSIS — M17.12 PRIMARY OSTEOARTHRITIS OF LEFT KNEE: ICD-10-CM

## 2019-11-12 DIAGNOSIS — M17.11 PRIMARY OSTEOARTHRITIS OF RIGHT KNEE: Primary | ICD-10-CM

## 2019-11-12 PROCEDURE — 20610 DRAIN/INJ JOINT/BURSA W/O US: CPT | Performed by: ORTHOPAEDIC SURGERY

## 2019-11-12 RX ORDER — HYALURONATE SODIUM 10 MG/ML
20 SYRINGE (ML) INTRAARTICULAR ONCE
Status: COMPLETED | OUTPATIENT
Start: 2019-11-12 | End: 2019-11-12

## 2019-11-12 RX ADMIN — Medication 20 MG: at 14:12

## 2019-11-12 RX ADMIN — Medication 20 MG: at 14:13

## 2019-11-18 ENCOUNTER — HOSPITAL ENCOUNTER (OUTPATIENT)
Age: 64
Discharge: HOME | End: 2019-11-20
Payer: MEDICAID

## 2019-11-18 ENCOUNTER — OFFICE VISIT (OUTPATIENT)
Dept: PAIN MANAGEMENT | Age: 64
End: 2019-11-18
Payer: MEDICAID

## 2019-11-18 VITALS
RESPIRATION RATE: 16 BRPM | HEART RATE: 62 BPM | WEIGHT: 225 LBS | DIASTOLIC BLOOD PRESSURE: 64 MMHG | HEIGHT: 66 IN | OXYGEN SATURATION: 98 % | SYSTOLIC BLOOD PRESSURE: 110 MMHG | TEMPERATURE: 97.9 F | BODY MASS INDEX: 36.16 KG/M2

## 2019-11-18 DIAGNOSIS — M17.12 PRIMARY OSTEOARTHRITIS OF LEFT KNEE: ICD-10-CM

## 2019-11-18 DIAGNOSIS — M47.816 LUMBAR FACET ARTHROPATHY: ICD-10-CM

## 2019-11-18 DIAGNOSIS — G89.4 CHRONIC PAIN SYNDROME: ICD-10-CM

## 2019-11-18 DIAGNOSIS — R30.0 DYSURIA: Primary | ICD-10-CM

## 2019-11-18 DIAGNOSIS — M47.816 LUMBAR SPONDYLOSIS: ICD-10-CM

## 2019-11-18 DIAGNOSIS — R30.0 DYSURIA: ICD-10-CM

## 2019-11-18 DIAGNOSIS — M54.16 LUMBAR RADICULOPATHY: ICD-10-CM

## 2019-11-18 DIAGNOSIS — Z96.641 STATUS POST TOTAL HIP REPLACEMENT, RIGHT: ICD-10-CM

## 2019-11-18 DIAGNOSIS — M43.16 SPONDYLOLISTHESIS AT L3-L4 LEVEL: ICD-10-CM

## 2019-11-18 DIAGNOSIS — G89.29 CHRONIC PAIN OF LEFT KNEE: ICD-10-CM

## 2019-11-18 DIAGNOSIS — M16.11 PRIMARY OSTEOARTHRITIS OF RIGHT HIP: ICD-10-CM

## 2019-11-18 DIAGNOSIS — M16.0 PRIMARY OSTEOARTHRITIS OF BOTH HIPS: ICD-10-CM

## 2019-11-18 DIAGNOSIS — G89.29 CHRONIC PAIN OF RIGHT KNEE: ICD-10-CM

## 2019-11-18 DIAGNOSIS — M25.561 CHRONIC PAIN OF RIGHT KNEE: ICD-10-CM

## 2019-11-18 DIAGNOSIS — M25.562 CHRONIC PAIN OF LEFT KNEE: ICD-10-CM

## 2019-11-18 LAB
BACTERIA: ABNORMAL /HPF
BILIRUBIN URINE: NEGATIVE
BLOOD, URINE: NEGATIVE
CLARITY: CLEAR
COLOR: YELLOW
EPITHELIAL CELLS, UA: ABNORMAL /HPF
GLUCOSE URINE: NEGATIVE MG/DL
KETONES, URINE: NEGATIVE MG/DL
LEUKOCYTE ESTERASE, URINE: ABNORMAL
NITRITE, URINE: POSITIVE
PH UA: 6 (ref 5–9)
PROTEIN UA: NEGATIVE MG/DL
RBC UA: ABNORMAL /HPF (ref 0–2)
SPECIFIC GRAVITY UA: <=1.005 (ref 1–1.03)
UROBILINOGEN, URINE: 0.2 E.U./DL
WBC UA: ABNORMAL /HPF (ref 0–5)

## 2019-11-18 PROCEDURE — 80307 DRUG TEST PRSMV CHEM ANLYZR: CPT

## 2019-11-18 PROCEDURE — 99213 OFFICE O/P EST LOW 20 MIN: CPT | Performed by: NURSE PRACTITIONER

## 2019-11-18 PROCEDURE — G0480 DRUG TEST DEF 1-7 CLASSES: HCPCS

## 2019-11-18 PROCEDURE — 81001 URINALYSIS AUTO W/SCOPE: CPT

## 2019-11-18 RX ORDER — ACETAMINOPHEN AND CODEINE PHOSPHATE 300; 30 MG/1; MG/1
1 TABLET ORAL DAILY PRN
Qty: 15 TABLET | Refills: 0 | Status: SHIPPED | OUTPATIENT
Start: 2019-11-18 | End: 2019-12-16 | Stop reason: SDUPTHER

## 2019-11-19 RX ORDER — NITROFURANTOIN 25; 75 MG/1; MG/1
100 CAPSULE ORAL 2 TIMES DAILY
Qty: 14 CAPSULE | Refills: 0 | Status: SHIPPED | OUTPATIENT
Start: 2019-11-19 | End: 2019-11-26

## 2019-11-22 LAB
6AM URINE: <10 NG/ML
7-AMINOCLONAZEPAM, URINE: <5 NG/ML
ALPHA-HYDROXYALPRAZOLAM, URINE: <5 NG/ML
ALPHA-HYDROXYMIDAZOLAM, URINE: <20 NG/ML
ALPRAZOLAM, URINE: <5 NG/ML
CHLORDIAZEPOXIDE, URINE: <20 NG/ML
CLONAZEPAM, URINE: <5 NG/ML
CODEINE, URINE: 251 NG/ML
DIAZEPAM, URINE: <20 NG/ML
HYDROCODONE, URINE: <20 NG/ML
HYDROMORPHONE, URINE: <20 NG/ML
LORAZEPAM, URINE: 360 NG/ML
MIDAZOLAM, URINE: <20 NG/ML
MORPHINE URINE: <20 NG/ML
NORDIAZEPAM, URINE: <20 NG/ML
NORHYDROCODONE, URINE: <20 NG/ML
NOROXYCODONE, URINE: <20 NG/ML
NOROXYMORPHONE, URINE: <20 NG/ML
OXAZEPAM, URINE: <20 NG/ML
OXYCODONE, URINE CONFIRMATION: <20 NG/ML
OXYMORPHONE, URINE: <20 NG/ML
TEMAZEPAM, URINE: <20 NG/ML

## 2019-11-25 LAB
Lab: NORMAL
REPORT: NORMAL
THIS TEST SENT TO: NORMAL

## 2019-12-02 RX ORDER — NITROGLYCERIN 0.4 MG/1
0.4 TABLET SUBLINGUAL EVERY 5 MIN PRN
Qty: 25 TABLET | Refills: 5 | Status: SHIPPED | OUTPATIENT
Start: 2019-12-02

## 2019-12-13 ENCOUNTER — HOSPITAL ENCOUNTER (EMERGENCY)
Age: 64
Discharge: HOME | End: 2019-12-13
Payer: MEDICAID

## 2019-12-13 VITALS
SYSTOLIC BLOOD PRESSURE: 123 MMHG | HEIGHT: 66 IN | HEART RATE: 56 BPM | OXYGEN SATURATION: 99 % | RESPIRATION RATE: 18 BRPM | DIASTOLIC BLOOD PRESSURE: 68 MMHG | BODY MASS INDEX: 35.84 KG/M2 | WEIGHT: 223 LBS | TEMPERATURE: 97.5 F

## 2019-12-13 DIAGNOSIS — S80.02XA CONTUSION OF LEFT KNEE, INITIAL ENCOUNTER: Primary | ICD-10-CM

## 2019-12-13 DIAGNOSIS — M54.50 CHRONIC RIGHT-SIDED LOW BACK PAIN WITHOUT SCIATICA: ICD-10-CM

## 2019-12-13 DIAGNOSIS — G89.29 CHRONIC RIGHT-SIDED LOW BACK PAIN WITHOUT SCIATICA: ICD-10-CM

## 2019-12-13 PROCEDURE — 99283 EMERGENCY DEPT VISIT LOW MDM: CPT

## 2019-12-13 PROCEDURE — 96372 THER/PROPH/DIAG INJ SC/IM: CPT

## 2019-12-13 PROCEDURE — 6370000000 HC RX 637 (ALT 250 FOR IP): Performed by: NURSE PRACTITIONER

## 2019-12-13 PROCEDURE — 6360000002 HC RX W HCPCS: Performed by: NURSE PRACTITIONER

## 2019-12-13 RX ORDER — KETOROLAC TROMETHAMINE 30 MG/ML
30 INJECTION, SOLUTION INTRAMUSCULAR; INTRAVENOUS ONCE
Status: COMPLETED | OUTPATIENT
Start: 2019-12-13 | End: 2019-12-13

## 2019-12-13 RX ORDER — OXYCODONE HYDROCHLORIDE AND ACETAMINOPHEN 5; 325 MG/1; MG/1
1 TABLET ORAL ONCE
Status: COMPLETED | OUTPATIENT
Start: 2019-12-13 | End: 2019-12-13

## 2019-12-13 RX ADMIN — OXYCODONE HYDROCHLORIDE AND ACETAMINOPHEN 1 TABLET: 5; 325 TABLET ORAL at 01:34

## 2019-12-13 RX ADMIN — KETOROLAC TROMETHAMINE 30 MG: 30 INJECTION, SOLUTION INTRAMUSCULAR; INTRAVENOUS at 01:33

## 2019-12-13 ASSESSMENT — PAIN DESCRIPTION - PAIN TYPE: TYPE: ACUTE PAIN

## 2019-12-13 ASSESSMENT — PAIN DESCRIPTION - ORIENTATION: ORIENTATION: LEFT

## 2019-12-13 ASSESSMENT — PAIN DESCRIPTION - LOCATION: LOCATION: LEG;BACK

## 2019-12-13 ASSESSMENT — PAIN SCALES - GENERAL: PAINLEVEL_OUTOF10: 8

## 2019-12-13 ASSESSMENT — PAIN DESCRIPTION - DESCRIPTORS: DESCRIPTORS: SHARP

## 2019-12-16 ENCOUNTER — OFFICE VISIT (OUTPATIENT)
Dept: PAIN MANAGEMENT | Age: 64
End: 2019-12-16
Payer: MEDICAID

## 2019-12-16 VITALS
DIASTOLIC BLOOD PRESSURE: 68 MMHG | HEIGHT: 66 IN | BODY MASS INDEX: 35.84 KG/M2 | TEMPERATURE: 97.6 F | OXYGEN SATURATION: 96 % | HEART RATE: 60 BPM | WEIGHT: 223 LBS | SYSTOLIC BLOOD PRESSURE: 115 MMHG | RESPIRATION RATE: 16 BRPM

## 2019-12-16 DIAGNOSIS — M47.816 LUMBAR FACET ARTHROPATHY: ICD-10-CM

## 2019-12-16 DIAGNOSIS — M25.561 ACUTE PAIN OF RIGHT KNEE: Primary | ICD-10-CM

## 2019-12-16 DIAGNOSIS — G89.4 CHRONIC PAIN SYNDROME: ICD-10-CM

## 2019-12-16 DIAGNOSIS — M43.16 SPONDYLOLISTHESIS AT L3-L4 LEVEL: ICD-10-CM

## 2019-12-16 DIAGNOSIS — M16.11 PRIMARY OSTEOARTHRITIS OF RIGHT HIP: ICD-10-CM

## 2019-12-16 DIAGNOSIS — M16.0 PRIMARY OSTEOARTHRITIS OF BOTH HIPS: ICD-10-CM

## 2019-12-16 DIAGNOSIS — M25.561 CHRONIC PAIN OF RIGHT KNEE: ICD-10-CM

## 2019-12-16 DIAGNOSIS — M54.16 LUMBAR RADICULOPATHY: ICD-10-CM

## 2019-12-16 DIAGNOSIS — M47.816 LUMBAR SPONDYLOSIS: ICD-10-CM

## 2019-12-16 DIAGNOSIS — M17.12 PRIMARY OSTEOARTHRITIS OF LEFT KNEE: ICD-10-CM

## 2019-12-16 DIAGNOSIS — M25.562 CHRONIC PAIN OF LEFT KNEE: ICD-10-CM

## 2019-12-16 DIAGNOSIS — Z96.641 STATUS POST TOTAL HIP REPLACEMENT, RIGHT: ICD-10-CM

## 2019-12-16 DIAGNOSIS — G89.29 CHRONIC PAIN OF LEFT KNEE: ICD-10-CM

## 2019-12-16 DIAGNOSIS — G89.29 CHRONIC PAIN OF RIGHT KNEE: ICD-10-CM

## 2019-12-16 PROCEDURE — 99213 OFFICE O/P EST LOW 20 MIN: CPT | Performed by: NURSE PRACTITIONER

## 2019-12-16 PROCEDURE — 6360000002 HC RX W HCPCS

## 2019-12-16 RX ORDER — KETOROLAC TROMETHAMINE 30 MG/ML
30 INJECTION, SOLUTION INTRAMUSCULAR; INTRAVENOUS ONCE
Status: COMPLETED | OUTPATIENT
Start: 2019-12-16 | End: 2019-12-16

## 2019-12-16 RX ORDER — TRIAMCINOLONE ACETONIDE 40 MG/ML
40 INJECTION, SUSPENSION INTRA-ARTICULAR; INTRAMUSCULAR ONCE
Status: COMPLETED | OUTPATIENT
Start: 2019-12-16 | End: 2019-12-16

## 2019-12-16 RX ORDER — ACETAMINOPHEN AND CODEINE PHOSPHATE 300; 30 MG/1; MG/1
1 TABLET ORAL DAILY PRN
Qty: 30 TABLET | Refills: 1 | Status: SHIPPED | OUTPATIENT
Start: 2019-12-16 | End: 2020-01-15

## 2019-12-16 RX ADMIN — KETOROLAC TROMETHAMINE 30 MG: 30 INJECTION, SOLUTION INTRAMUSCULAR; INTRAVENOUS at 10:37

## 2019-12-16 RX ADMIN — TRIAMCINOLONE ACETONIDE 40 MG: 40 INJECTION, SUSPENSION INTRA-ARTICULAR; INTRAMUSCULAR at 10:38

## 2019-12-25 ENCOUNTER — HOSPITAL ENCOUNTER (EMERGENCY)
Age: 64
Discharge: HOME | End: 2019-12-25
Payer: MEDICAID

## 2019-12-25 VITALS
OXYGEN SATURATION: 96 % | HEIGHT: 66 IN | TEMPERATURE: 97.7 F | DIASTOLIC BLOOD PRESSURE: 83 MMHG | SYSTOLIC BLOOD PRESSURE: 134 MMHG | HEART RATE: 65 BPM | WEIGHT: 223 LBS | RESPIRATION RATE: 16 BRPM | BODY MASS INDEX: 35.84 KG/M2

## 2019-12-25 DIAGNOSIS — M25.561 CHRONIC PAIN OF RIGHT KNEE: Primary | ICD-10-CM

## 2019-12-25 DIAGNOSIS — G89.29 OTHER CHRONIC PAIN: ICD-10-CM

## 2019-12-25 DIAGNOSIS — G89.29 CHRONIC PAIN OF RIGHT KNEE: Primary | ICD-10-CM

## 2019-12-25 PROCEDURE — 6370000000 HC RX 637 (ALT 250 FOR IP): Performed by: NURSE PRACTITIONER

## 2019-12-25 PROCEDURE — 99283 EMERGENCY DEPT VISIT LOW MDM: CPT

## 2019-12-25 RX ORDER — HYDROCODONE BITARTRATE AND ACETAMINOPHEN 5; 325 MG/1; MG/1
1 TABLET ORAL ONCE
Status: COMPLETED | OUTPATIENT
Start: 2019-12-25 | End: 2019-12-25

## 2019-12-25 RX ORDER — HYDROCODONE BITARTRATE AND ACETAMINOPHEN 5; 325 MG/1; MG/1
1 TABLET ORAL EVERY 6 HOURS PRN
Qty: 8 TABLET | Refills: 0 | Status: SHIPPED | OUTPATIENT
Start: 2019-12-25 | End: 2019-12-25

## 2019-12-25 RX ADMIN — HYDROCODONE BITARTRATE AND ACETAMINOPHEN 1 TABLET: 5; 325 TABLET ORAL at 02:34

## 2019-12-25 ASSESSMENT — PAIN DESCRIPTION - PAIN TYPE: TYPE: CHRONIC PAIN

## 2019-12-25 ASSESSMENT — PAIN DESCRIPTION - DESCRIPTORS: DESCRIPTORS: ACHING

## 2019-12-25 ASSESSMENT — PAIN SCALES - GENERAL: PAINLEVEL_OUTOF10: 7

## 2019-12-25 ASSESSMENT — PAIN DESCRIPTION - LOCATION: LOCATION: LEG

## 2019-12-25 ASSESSMENT — PAIN DESCRIPTION - ORIENTATION: ORIENTATION: LEFT;RIGHT

## 2019-12-25 ASSESSMENT — PAIN DESCRIPTION - FREQUENCY: FREQUENCY: INTERMITTENT

## 2020-01-10 ENCOUNTER — OFFICE VISIT (OUTPATIENT)
Dept: ENT CLINIC | Age: 65
End: 2020-01-10
Payer: MEDICAID

## 2020-01-10 ENCOUNTER — PROCEDURE VISIT (OUTPATIENT)
Dept: AUDIOLOGY | Age: 65
End: 2020-01-10
Payer: MEDICAID

## 2020-01-10 VITALS
DIASTOLIC BLOOD PRESSURE: 72 MMHG | SYSTOLIC BLOOD PRESSURE: 108 MMHG | HEIGHT: 66 IN | WEIGHT: 221 LBS | BODY MASS INDEX: 35.52 KG/M2 | HEART RATE: 49 BPM

## 2020-01-10 PROCEDURE — 92557 COMPREHENSIVE HEARING TEST: CPT | Performed by: AUDIOLOGIST

## 2020-01-10 PROCEDURE — 99213 OFFICE O/P EST LOW 20 MIN: CPT | Performed by: OTOLARYNGOLOGY

## 2020-01-10 PROCEDURE — 92567 TYMPANOMETRY: CPT | Performed by: AUDIOLOGIST

## 2020-01-10 NOTE — PROGRESS NOTES
This patient was referred for audiometric/tympanometric testing by Dr. Peter Al. Patient is being seen for her yearly ENT/Audiology consult, with no acute issues, at this time, per patient report. Audiometry revealed a mild-to-moderately-severe sensorineural hearing loss, through the frequency range, bilaterally. Reliability was poor. Speech reception thresholds were in fair agreement with the pure tone averages, bilaterally. Speech discrimination scores were 100%, at 40dBSL. Tympanometry revealed normal middle ear peak pressure and compliance, bilaterally. The results were reviewed with the patient. Recommendations for follow up will be made pending physician consult.     Betojere Parsonsi, [address] [place] [address]

## 2020-01-10 NOTE — PROGRESS NOTES
PLATELET GEL, MEDTRONIC performed by Sam Adames MD at [address]         Current Outpatient Medications:     acetaminophen-codeine (TYLENOL/CODEINE #3) 300-30 MG per tablet, Take 1 tablet by mouth daily as needed for Pain for up to 30 days. , Disp: 30 tablet, Rfl: 1    nitroGLYCERIN (NITROSTAT) 0.4 MG SL tablet, Place 1 tablet under the tongue every 5 minutes as needed for Chest pain, Disp: 25 tablet, Rfl: 5    Melatonin-Pyridoxine 5-1 MG TABS, Take by mouth, Disp: , Rfl:     Propylene Glycol (SYSTANE BALANCE) 0.6 % SOLN, Apply to eye, Disp: , Rfl:     aspirin 81 MG chewable tablet, Take 1 tablet by mouth daily, Disp: 30 tablet, Rfl: 3    vitamin D (ERGOCALCIFEROL) 72147 units CAPS capsule, Take 50,000 Units by mouth once a week, Disp: , Rfl:     pantoprazole sodium (PROTONIX) 40 MG PACK packet, Take 40 mg by mouth every morning (before breakfast), Disp: , Rfl:     calcium carbonate (TUMS) 500 MG chewable tablet, Take 2 tablets by mouth 4 times daily as needed for Heartburn, Disp: , Rfl:     fluPHENAZine HCl (PROLIXIN) 10 MG tablet, Take 10 mg by mouth 2 times daily TAKES AN EXTRA 1/2 TAB AT BEDTIME, Disp: , Rfl:     ARIPiprazole (ABILIFY) 30 MG tablet, Take 30 mg by mouth daily, Disp: , Rfl:     metFORMIN (GLUCOPHAGE) 500 MG tablet, Take 500 mg by mouth daily (with breakfast), Disp: , Rfl:     LORazepam (ATIVAN) 0.5 MG tablet, Take 0.5 mg by mouth 2 times daily.  , Disp: , Rfl:     oxybutynin (DITROPAN) 5 MG tablet, Take 5 mg by mouth 3 times daily, Disp: , Rfl:     polyvinyl alcohol (ARTIFICIAL TEARS) 1.4 % ophthalmic solution, Place 1 drop into both eyes 4 times daily , Disp: , Rfl:     gabapentin (NEURONTIN) 100 MG capsule, Take 100 mg by mouth 3 times daily , Disp: , Rfl:     benztropine (COGENTIN) 0.5 MG tablet, Take 0.5 mg by mouth 3 times daily , Disp: , Rfl:     levothyroxine (SYNTHROID) 25 MCG tablet, Cancer Maternal Aunt        Review of Systems   Constitutional: Negative for chills and fever. HENT: Positive for hearing loss. Negative for congestion, ear discharge, rhinorrhea, trouble swallowing and voice change. Respiratory: Negative for cough and shortness of breath. Cardiovascular: Negative for chest pain and palpitations. Gastrointestinal: Negative for vomiting. Skin: Negative for rash. Allergic/Immunologic: Negative for environmental allergies. Neurological: Negative for dizziness and headaches. Hematological: Does not bruise/bleed easily. All other systems reviewed and are negative. /72   Pulse (!) 49   Ht 5' 6\" (1.676 m)   Wt 221 lb (100.2 kg)   BMI 35.67 kg/m²   Physical Exam   Constitutional: She appears well-developed and well-nourished. HENT:   Head: Normocephalic. Right Ear: External ear normal. Decreased hearing is noted. Left Ear: External ear normal. Decreased hearing is noted. Nose: No rhinorrhea, nose lacerations, sinus tenderness or septal deviation. Right sinus exhibits no maxillary sinus tenderness and no frontal sinus tenderness. Left sinus exhibits no maxillary sinus tenderness and no frontal sinus tenderness. Mouth/Throat: No oral lesions. No dental caries. No oropharyngeal exudate. Eyes: Pupils are equal, round, and reactive to light. EOM are normal.   Neck: Normal range of motion. No tracheal deviation present. No thyromegaly present. Cardiovascular: Normal rate and intact distal pulses. Pulmonary/Chest: Effort normal. No respiratory distress. Musculoskeletal: Normal range of motion. She exhibits no edema. Lymphadenopathy:     She has no cervical adenopathy. Neurological: She is alert. No cranial nerve deficit. Skin: Skin is warm. No erythema. Nursing note and vitals reviewed.                       IMPRESSION/PLAN:  Patient is seen and examined for history of known bilateral moderate sloping to severe sensorineural hearing

## 2020-01-21 ENCOUNTER — OFFICE VISIT (OUTPATIENT)
Dept: ORTHOPEDIC SURGERY | Age: 65
End: 2020-01-21
Payer: MEDICAID

## 2020-01-21 VITALS — BODY MASS INDEX: 35.5 KG/M2 | HEIGHT: 66 IN | WEIGHT: 220.9 LBS

## 2020-01-21 PROCEDURE — 99214 OFFICE O/P EST MOD 30 MIN: CPT | Performed by: ORTHOPAEDIC SURGERY

## 2020-01-21 RX ORDER — ACETAMINOPHEN AND CODEINE PHOSPHATE 300; 30 MG/1; MG/1
TABLET ORAL
COMMUNITY
Start: 2019-06-25 | End: 2020-06-09

## 2020-01-21 NOTE — PROGRESS NOTES
Chief Complaint   Patient presents with    Follow-up     follow up from bilateral knee pain, patient had injections of Euflexxa 11/12/19, patient complains of pain that is shooting and sharp that goes from her hip down into her foot. Areli Templeton returns today for follow-up of her right knee pain. she reports this is worse than when I saw her last.  The patient's pain level is a  9/10. The previous treatment was not successful. She finished Euflexxa injections in with no relief.     Past Medical History:   Diagnosis Date    Anxiety     Arthritis     Bipolar 2 disorder ([facility])     Chronic back pain     CKD     Constipation     Continuous urine leakage     COPD (chronic obstructive pulmonary disease) ([facility])     no oxygen use, BIpap at night     Depression     Dysphagia     GERD (gastroesophageal reflux disease)     History of cardiovascular stress test 10-    LEXISCAN    History of cardiovascular stress test 06/2014    lexiscan stress    History of cardiovascular stress test 05/22/2018    Lexiscan stress test    Hx of cardiovascular stress test 07/04/2019    Lexiscan stress test    Hyperlipidemia     MDRO (multiple drug resistant organisms) resistance     mrsa 3-4 yrs ago with back surgery (2015)    Menopausal symptoms     atrophic vaginitis    Mitral valve prolapse     Nausea & vomiting     with anesthesia    Neurogenic bladder     Obstructive sleep apnea     uses cpap    EMILEE treated with BiPAP     Overactive bladder     Pulmonary embolism ([facility])     few yrs ago has vena cava filter    PVD (peripheral vascular disease) ([facility])     Schizoaffective disorder ([address]     Seizure disorder ([facility])     stares- Last one 1-2019     Thyroid disease     x30 years    Type 2 diabetes mellitus, without long-term current use of insulin ([address] 7/16/2018     Past Surgical History:   Procedure Laterality Date    ANESTHESIA NERVE BLOCK Right 5/20/2019    RIGHT TRANSFORAMINAL EPIDURAL L4-5 #1 performed FILTER PLACEMENT         Current Outpatient Medications:     acetaminophen-codeine (TYLENOL #3) 300-30 MG per tablet, take 2 tabs y mout 4 timeas a day as needed, Disp: , Rfl:     nitroGLYCERIN (NITROSTAT) 0.4 MG SL tablet, Place 1 tablet under the tongue every 5 minutes as needed for Chest pain, Disp: 25 tablet, Rfl: 5    Melatonin-Pyridoxine 5-1 MG TABS, Take by mouth, Disp: , Rfl:     Propylene Glycol (SYSTANE BALANCE) 0.6 % SOLN, Apply to eye, Disp: , Rfl:     sodium chloride (DEEP SEA NASAL SPRAY) 0.65 % nasal spray, by Nasal route , Disp: , Rfl:     aspirin 81 MG chewable tablet, Take 1 tablet by mouth daily, Disp: 30 tablet, Rfl: 3    triamcinolone (KENALOG) 0.025 % cream, Apply Topically once per week to external ears, Disp: 1 Tube, Rfl: 0    vitamin D (ERGOCALCIFEROL) 78129 units CAPS capsule, Take 50,000 Units by mouth once a week, Disp: , Rfl:     loperamide (IMODIUM) 2 MG capsule, Take 2 mg by mouth 4 times daily as needed for Diarrhea, Disp: , Rfl:     pantoprazole sodium (PROTONIX) 40 MG PACK packet, Take 40 mg by mouth every morning (before breakfast), Disp: , Rfl:     calcium carbonate (TUMS) 500 MG chewable tablet, Take 2 tablets by mouth 4 times daily as needed for Heartburn, Disp: , Rfl:     fluPHENAZine HCl (PROLIXIN) 10 MG tablet, Take 10 mg by mouth 2 times daily TAKES AN EXTRA 1/2 TAB AT BEDTIME, Disp: , Rfl:     ARIPiprazole (ABILIFY) 30 MG tablet, Take 30 mg by mouth daily, Disp: , Rfl:     metFORMIN (GLUCOPHAGE) 500 MG tablet, Take 500 mg by mouth daily (with breakfast), Disp: , Rfl:     albuterol sulfate  (90 Base) MCG/ACT inhaler, Inhale 2 puffs into the lungs every 6 hours as needed for Wheezing or Shortness of Breath UP TO 4 TIMES A DAY, Disp: , Rfl:     LORazepam (ATIVAN) 0.5 MG tablet, Take 0.5 mg by mouth 2 times daily.  , Disp: , Rfl:     oxybutynin (DITROPAN) 5 MG tablet, Take 5 mg by mouth 3 times daily, Disp: , Rfl:     venlafaxine (EFFEXOR XR) 150 MG sclera are white. Conjunctivae are clear. TM's are intact. Pharynx is pink and moist.  Uvula and tongue are midline. Heart: Positive S1 and positive S2 with regular rate and rhythm. Lungs: Clear to auscultation bilaterally without rales, rhonchi or wheezes. Abdomen: soft, nontender. Positive bowel sounds. No organomegaly. No guarding or rigidity. The patient is alert and oriented x 3, appears to be stated age and in no distress. Ht 5' 5.98\" (1.676 m)   Wt 220 lb 14.4 oz (100.2 kg)   BMI 35.67 kg/m²     Skin:  Upon inspection: the skin appears warm, dry and intact. There is not a previous scar over the affected area. There is not any cellulitis, lymphedema or cutaneous lesions noted in the lower extremities. Upon palpation there is no induration noted. Neurologic:  Gait: normal;  Motor exam of the lower extremities show ; quadriceps, hamstrings, foot dorsi and plantar flexors intact R.  5/5 and L. 5/5. Deep tendon reflexes are 2/4 at the knees and 2/4 at the ankles with strong extensor hallicus longus motor strength bilaterally. Sensory to both feet is intact to all sensory roots. Cardiovascular: The vascular exam is normal and is well perfused to distal extremities. Distal pulses DP/PT: R. 2+; L. 2+. There is cap refill noted less than two seconds in all digits. There is not edema of the bilateral lower extremities. There is not varicosities noted in the distal extremities. Lymph:  Upon palpation,  there is no lymphadenopathy noted in bilateral lower extremities. Musculoskeletal:  Gait: antalgic and wheeled walker; examination of the nails and digits reveal no cyanosis or clubbing    Lumbar exam:  On visual inspection, there is no deformity of the spine. full range of motion, no tenderness, palpable spasm or pain on motion. Special tests: Straight Leg Raise negative, Favian testnegative. Hip exam:  Upon inspection, there is no deformity noted.   Upon palpation there is not tenderness. ROM: is   full and semetrical.   Strength: Hip Flexors 5/5; Hip Abductors 5/5; Hip Adduction 5/5. Knee exam:  Right knee exam shows;  range of motion of R. Knee is 0 to 105, and L. Knee is 0 to 115. She does have  pain on motion, effusion is mild, there is tenderness over the  medial, anterior region, there are not any masses, there is not ligamentous instability, there is  deformity noted. Knee exam: right positive for moderate crepitations, some mild tenderness laxity is not noted with stress. R. Knee:  Lachman's negative, Anterior Drawer negative, Posterior Drawer negative  Kendrick's negative, Thallasy  negative,   PF grind test negative, Apprehension test negative, Patellar J sign  negative  L. Knee:  Lachman's negative, Anterior Drawer negative, Posterior Drawer negative  Kendrick's negative, Thallasy  negative,   PF grind test negative, Apprehension test negative,  Patellar J sign  negative    Xrays:   Mild tricompartmental degenerative changes are noted,   greatest in the medial compartment. There is a small joint effusion. There is normal alignment. The patella is well located.  No fractures   are identified. MRI:   Not performed. Radiographic findings reviewed with patient    Impression:  Encounter Diagnoses   Name Primary?  Primary osteoarthritis of right knee Yes    Primary osteoarthritis of left knee        Plan:   Natural history and expected course discussed. Questions answered. Educational materials distributed. Rest, ice, compression, and elevation (RICE) therapy. I I had a lengthy discussion with the patient regarding their diagnosis. I explained treatment options including surgical vs non surgical treatment. I reviewed in detail the risks and benefits and outlined the procedure in detail with expected outcomes and possible complications.   I also discussed non surgical treatment such as injections (CSI and visco supplementation), physical therapy, topical creams and NSAID's. They have elected for surgical management at this time. The risks and benefits of a knee arthroscopy were discussed with the patient. The risks are including but not limited to: infection, injuries to blood vessels and nerves, non relief of symptoms, arthrofibrosis of knee, need for further operative intervention, blood loss, PE/DVT, MI and death.   The risks are understood by the patient and they wish to proceed with a Right knee arthroscopy, medial menisectomy and debridement, she will talk to PCP and let us know, it will need to be at the hospital.

## 2020-02-10 ENCOUNTER — TELEPHONE (OUTPATIENT)
Dept: ADMINISTRATIVE | Age: 65
End: 2020-02-10

## 2020-02-12 ENCOUNTER — HOSPITAL ENCOUNTER (EMERGENCY)
Age: 65
Discharge: PSYCHIATRIC OR SUBSTANCE USE FACILITY | End: 2020-02-13
Attending: EMERGENCY MEDICINE
Payer: MEDICAID

## 2020-02-12 LAB
BILIRUBIN URINE: NEGATIVE
BLOOD, URINE: NEGATIVE
CLARITY: CLEAR
COLOR: YELLOW
GLUCOSE URINE: NEGATIVE MG/DL
HCT VFR BLD CALC: 40.2 % (ref 34–48)
HEMOGLOBIN: 13 G/DL (ref 11.5–15.5)
KETONES, URINE: NEGATIVE MG/DL
LEUKOCYTE ESTERASE, URINE: ABNORMAL
MCH RBC QN AUTO: 29.3 PG (ref 26–35)
MCHC RBC AUTO-ENTMCNC: 32.3 % (ref 32–34.5)
MCV RBC AUTO: 90.7 FL (ref 80–99.9)
NITRITE, URINE: POSITIVE
PDW BLD-RTO: 14.2 FL (ref 11.5–15)
PH UA: 7 (ref 5–9)
PLATELET # BLD: 155 E9/L (ref 130–450)
PMV BLD AUTO: 11.4 FL (ref 7–12)
PROTEIN UA: NEGATIVE MG/DL
RBC # BLD: 4.43 E12/L (ref 3.5–5.5)
SPECIFIC GRAVITY UA: 1.01 (ref 1–1.03)
UROBILINOGEN, URINE: 0.2 E.U./DL
WBC # BLD: 6.5 E9/L (ref 4.5–11.5)

## 2020-02-12 PROCEDURE — 80307 DRUG TEST PRSMV CHEM ANLYZR: CPT

## 2020-02-12 PROCEDURE — 84484 ASSAY OF TROPONIN QUANT: CPT

## 2020-02-12 PROCEDURE — G0480 DRUG TEST DEF 1-7 CLASSES: HCPCS

## 2020-02-12 PROCEDURE — 36415 COLL VENOUS BLD VENIPUNCTURE: CPT

## 2020-02-12 PROCEDURE — 93005 ELECTROCARDIOGRAM TRACING: CPT | Performed by: EMERGENCY MEDICINE

## 2020-02-12 PROCEDURE — 80053 COMPREHEN METABOLIC PANEL: CPT

## 2020-02-12 PROCEDURE — 99285 EMERGENCY DEPT VISIT HI MDM: CPT

## 2020-02-12 PROCEDURE — 81001 URINALYSIS AUTO W/SCOPE: CPT

## 2020-02-12 PROCEDURE — 85027 COMPLETE CBC AUTOMATED: CPT

## 2020-02-13 VITALS
SYSTOLIC BLOOD PRESSURE: 139 MMHG | TEMPERATURE: 97.6 F | HEART RATE: 52 BPM | HEIGHT: 66 IN | OXYGEN SATURATION: 98 % | DIASTOLIC BLOOD PRESSURE: 79 MMHG | BODY MASS INDEX: 33.75 KG/M2 | RESPIRATION RATE: 16 BRPM | WEIGHT: 210 LBS

## 2020-02-13 LAB
ACETAMINOPHEN LEVEL: 5.5 MCG/ML (ref 10–30)
ALBUMIN SERPL-MCNC: 4.1 G/DL (ref 3.5–5.2)
ALP BLD-CCNC: 72 U/L (ref 35–104)
ALT SERPL-CCNC: 19 U/L (ref 0–32)
AMORPHOUS: ABNORMAL
AMPHETAMINE SCREEN, URINE: NOT DETECTED
ANION GAP SERPL CALCULATED.3IONS-SCNC: 10 MMOL/L (ref 7–16)
AST SERPL-CCNC: 19 U/L (ref 0–31)
BACTERIA: ABNORMAL /HPF
BARBITURATE SCREEN URINE: NOT DETECTED
BENZODIAZEPINE SCREEN, URINE: NOT DETECTED
BILIRUB SERPL-MCNC: <0.2 MG/DL (ref 0–1.2)
BUN BLDV-MCNC: 13 MG/DL (ref 8–23)
CALCIUM SERPL-MCNC: 9.5 MG/DL (ref 8.6–10.2)
CANNABINOID SCREEN URINE: NOT DETECTED
CHLORIDE BLD-SCNC: 107 MMOL/L (ref 98–107)
CO2: 23 MMOL/L (ref 22–29)
COCAINE METABOLITE SCREEN URINE: NOT DETECTED
CREAT SERPL-MCNC: 0.8 MG/DL (ref 0.5–1)
EKG ATRIAL RATE: 48 BPM
EKG P AXIS: 19 DEGREES
EKG P-R INTERVAL: 190 MS
EKG Q-T INTERVAL: 468 MS
EKG QRS DURATION: 96 MS
EKG QTC CALCULATION (BAZETT): 418 MS
EKG R AXIS: -26 DEGREES
EKG T AXIS: 8 DEGREES
EKG VENTRICULAR RATE: 48 BPM
ETHANOL: <10 MG/DL (ref 0–0.08)
FENTANYL SCREEN, URINE: NOT DETECTED
GFR AFRICAN AMERICAN: >60
GFR NON-AFRICAN AMERICAN: >60 ML/MIN/1.73
GLUCOSE BLD-MCNC: 118 MG/DL (ref 74–99)
Lab: ABNORMAL
METHADONE SCREEN, URINE: NOT DETECTED
OPIATE SCREEN URINE: POSITIVE
OXYCODONE URINE: NOT DETECTED
PHENCYCLIDINE SCREEN URINE: NOT DETECTED
POTASSIUM SERPL-SCNC: 4.1 MMOL/L (ref 3.5–5)
RBC UA: ABNORMAL /HPF (ref 0–2)
SALICYLATE, SERUM: <0.3 MG/DL (ref 0–30)
SODIUM BLD-SCNC: 140 MMOL/L (ref 132–146)
TOTAL PROTEIN: 6.8 G/DL (ref 6.4–8.3)
TRICYCLIC ANTIDEPRESSANTS SCREEN SERUM: NEGATIVE NG/ML
TROPONIN: <0.01 NG/ML (ref 0–0.03)
WBC UA: ABNORMAL /HPF (ref 0–5)

## 2020-02-13 PROCEDURE — 6370000000 HC RX 637 (ALT 250 FOR IP)

## 2020-02-13 PROCEDURE — 6370000000 HC RX 637 (ALT 250 FOR IP): Performed by: EMERGENCY MEDICINE

## 2020-02-13 RX ORDER — FLUTICASONE PROPIONATE 50 MCG
2 SPRAY, SUSPENSION (ML) NASAL EVERY MORNING
COMMUNITY
End: 2020-06-09 | Stop reason: ALTCHOICE

## 2020-02-13 RX ORDER — PALIPERIDONE 3 MG/1
3 TABLET, EXTENDED RELEASE ORAL NIGHTLY
COMMUNITY
End: 2020-06-09 | Stop reason: ALTCHOICE

## 2020-02-13 RX ORDER — SENNA PLUS 8.6 MG/1
1 TABLET ORAL NIGHTLY PRN
COMMUNITY
End: 2020-06-09 | Stop reason: ALTCHOICE

## 2020-02-13 RX ORDER — ACETAMINOPHEN 325 MG/1
TABLET ORAL
Status: COMPLETED
Start: 2020-02-13 | End: 2020-02-13

## 2020-02-13 RX ORDER — FAMOTIDINE 40 MG/1
40 TABLET, FILM COATED ORAL
COMMUNITY

## 2020-02-13 RX ORDER — ACETAMINOPHEN 500 MG
1000 TABLET ORAL EVERY 8 HOURS PRN
COMMUNITY

## 2020-02-13 RX ORDER — OXYCODONE AND ACETAMINOPHEN 10; 325 MG/1; MG/1
1 TABLET ORAL 3 TIMES DAILY PRN
COMMUNITY
End: 2020-06-09

## 2020-02-13 RX ORDER — ACETAMINOPHEN 325 MG/1
650 TABLET ORAL ONCE
Status: COMPLETED | OUTPATIENT
Start: 2020-02-13 | End: 2020-02-13

## 2020-02-13 RX ORDER — NITROFURANTOIN MACROCRYSTALS 50 MG/1
100 CAPSULE ORAL ONCE
Status: COMPLETED | OUTPATIENT
Start: 2020-02-13 | End: 2020-02-13

## 2020-02-13 RX ORDER — LUBIPROSTONE 24 UG/1
24 CAPSULE, GELATIN COATED ORAL 2 TIMES DAILY WITH MEALS
COMMUNITY
End: 2020-06-09 | Stop reason: ALTCHOICE

## 2020-02-13 RX ADMIN — ACETAMINOPHEN 650 MG: 325 TABLET ORAL at 05:43

## 2020-02-13 RX ADMIN — NITROFURANTOIN MACROCRYSTALS 100 MG: 50 CAPSULE ORAL at 01:34

## 2020-02-13 ASSESSMENT — PAIN SCALES - GENERAL: PAINLEVEL_OUTOF10: 7

## 2020-02-13 NOTE — ED NOTES
PT was brought to ED from facility by ambulance for evaluation d/t A/V hallucinations. PT stated that she is hearing voices but cannot \"make them out\". She also admits to see \"rodents in her room\". PT denies HI/SI. PT has hx of schizophrenia, self injurious behavior (cutting and burning), and depression. PT states she has been taking all of her meds as prescribed. PT is calm and cooperative w/ staff. Mood and affect WDL. Pt also states she has had very poor sleep and appetite \"lately\". PT seeing outpatient mental health through [id number] [facility]. PTs meds are filled through [address] [facility].           Panchito Flores RN  02/12/20 [id number]

## 2020-02-13 NOTE — ED NOTES
N2N called to [facility] at [facility]. ALEJANDRO to transport between [phone].         Elisa Vigil RN  02/13/20 0205

## 2020-02-13 NOTE — ED PROVIDER NOTES
HPI:  2/12/20, Time: 10:25 PM         Sidra Shoemaker is a 62 y.o. female presenting to the ED for visual hallucinations, beginning days ago. The complaint has been persistent, moderate in severity, and worsened by nothing. Patient has history of bipolar disease and schizophrenia. Patient was brought in because of hallucinations. Patient reporting ongoing issue. Patient reports its worsened though. Patient reporting no chest pain no abdominal pain no vomiting no diarrhea no headache. Patient reporting no fever chills or cough. She does complain of some lower extremity pain that is chronic. She reports no weakness or dizziness    ROS:   Pertinent positives and negatives are stated within HPI, all other systems reviewed and are negative.  --------------------------------------------- PAST HISTORY ---------------------------------------------  Past Medical History:  has a past medical history of Anxiety, Arthritis, Bipolar 2 disorder ([address] Chronic back pain, CKD, Constipation, Continuous urine leakage, COPD (chronic obstructive pulmonary disease) ([address] Depression, Dysphagia, GERD (gastroesophageal reflux disease), History of cardiovascular stress test, History of cardiovascular stress test, History of cardiovascular stress test, Hx of cardiovascular stress test, Hyperlipidemia, MDRO (multiple drug resistant organisms) resistance, Menopausal symptoms, Mitral valve prolapse, Nausea & vomiting, Neurogenic bladder, Obstructive sleep apnea, EMILEE treated with BiPAP, Overactive bladder, Pulmonary embolism ([facility]), PVD (peripheral vascular disease) ([address] Schizoaffective disorder ([address] Seizure disorder ([address] Thyroid disease, and Type 2 diabetes mellitus, without long-term current use of insulin ([address] Past Surgical History:  has a past surgical history that includes Hysterectomy; hip surgery (Right); Bladder surgery; Vena Cava Filter Placement; ECHO Compl W Dop Color Flow (10/10/2012); Appendectomy;  Tonsillectomy; Colonoscopy; Endoscopy, colon, diagnostic; Knee arthroscopy (10-26-12); Hammer toe surgery; Breast surgery; back surgery; other surgical history; Upper gastrointestinal endoscopy; other surgical history (Bilateral, JULY 11, 2013); Diagnostic Cardiac Cath Lab Procedure (3667/0321); cyst removal (2006); other surgical history (Left, 7-22-15); pr arthdsis post/posterlatrl/postintrbdyadl spc/seg (N/A, 7/16/2018); Finger trigger release (Right, 03/22/2019); Finger trigger release (Right, 3/22/2019); Nerve Block (Right, 05/20/2019); Anesthesia Nerve Block (Right, 5/20/2019); epidural steroid injection (Right, 6/18/2019); Nerve Block (Right, 10/07/2019); and Anesthesia Nerve Block (Right, 10/7/2019). Social History:  reports that she quit smoking about 18 years ago. Her smoking use included cigarettes. She quit after 25.00 years of use. She has never used smokeless tobacco. She reports that she does not drink alcohol or use drugs. Family History: family history includes Breast Cancer in her maternal aunt, maternal aunt, and mother; Cancer in her mother; Cancer (age of onset: 77) in her father; Colon Cancer in her father; Heart Attack in her mother; Other in her brother, brother, and mother. The patients home medications have been reviewed. Allergies: Penicillins; Demerol; Depakote [divalproex sodium]; Haldol [haloperidol lactate]; and Sulfa antibiotics    ---------------------------------------------------PHYSICAL EXAM--------------------------------------    Constitutional/General: Alert and oriented x3, well appearing, non toxic in NAD  Head: Normocephalic and atraumatic  Eyes: PERRL, EOMI  Mouth: Oropharynx clear, handling secretions, no trismus  Neck: Supple, full ROM, non tender to palpation in the midline, no stridor, no crepitus, no meningeal signs  Pulmonary: Lungs clear to auscultation bilaterally, no wheezes, rales, or rhonchi. Not in respiratory distress  Cardiovascular:  Regular rate.  Regular rhythm. No murmurs, gallops, or rubs. 2+ distal pulses  Chest: no chest wall tenderness  Abdomen: Soft. Non tender. Non distended. +BS. No rebound, guarding, or rigidity. No pulsatile masses appreciated. Musculoskeletal: Moves all extremities x 4. Tenderness to right knee warm and well perfused, no clubbing, cyanosis, or edema. Capillary refill <3 seconds  Skin: warm and dry. No rashes. Neurologic: GCS 15, CN 2-12 grossly intact, no focal deficits, symmetric strength 5/5 in the upper and lower extremities bilaterally  Psych: no Homicidal suicidal thoughts. Patient does have hallucinations.    -------------------------------------------------- RESULTS -------------------------------------------------  I have personally reviewed all laboratory and imaging results for this patient. Results are listed below.      LABS:  Results for orders placed or performed during the hospital encounter of 02/12/20   CBC   Result Value Ref Range    WBC 6.5 4.5 - 11.5 E9/L    RBC 4.43 3.50 - 5.50 E12/L    Hemoglobin 13.0 11.5 - 15.5 g/dL    Hematocrit 40.2 34.0 - 48.0 %    MCV 90.7 80.0 - 99.9 fL    MCH 29.3 26.0 - 35.0 pg    MCHC 32.3 32.0 - 34.5 %    RDW 14.2 11.5 - 15.0 fL    Platelets 161 811 - 086 E9/L    MPV 11.4 7.0 - 12.0 fL   Comprehensive Metabolic Panel   Result Value Ref Range    Sodium 140 132 - 146 mmol/L    Potassium 4.1 3.5 - 5.0 mmol/L    Chloride 107 98 - 107 mmol/L    CO2 23 22 - 29 mmol/L    Anion Gap 10 7 - 16 mmol/L    Glucose 118 (H) 74 - 99 mg/dL    BUN 13 8 - 23 mg/dL    CREATININE 0.8 0.5 - 1.0 mg/dL    GFR Non-African American >60 >=60 mL/min/1.73    GFR African American >60     Calcium 9.5 8.6 - 10.2 mg/dL    Total Protein 6.8 6.4 - 8.3 g/dL    Alb 4.1 3.5 - 5.2 g/dL    Total Bilirubin <0.2 0.0 - 1.2 mg/dL    Alkaline Phosphatase 72 35 - 104 U/L    ALT 19 0 - 32 U/L    AST 19 0 - 31 U/L   Troponin   Result Value Ref Range    Troponin <0.01 0.00 - 0.03 ng/mL   Serum Drug Screen   Result Value Ref Range EKG available        ------------------------- NURSING NOTES AND VITALS REVIEWED ---------------------------   The nursing notes within the ED encounter and vital signs as below have been reviewed by myself. /76   Pulse 66   Temp 97.8 °F (36.6 °C)   Resp 14   Ht 5' 6\" (1.676 m)   Wt 210 lb (95.3 kg)   SpO2 95%   BMI 33.89 kg/m²   Oxygen Saturation Interpretation: Normal    The patients available past medical records and past encounters were reviewed. ------------------------------ ED COURSE/MEDICAL DECISION MAKING----------------------  Medications   nitrofurantoin (MACRODANTIN) capsule 100 mg (has no administration in time range)             Medical Decision Making:        Re-Evaluations:             Re-evaluation. Patients symptoms show no change  Patient vital signs noted and patient does have urinary tract infection. Patient is medically clear from my standpoint. Consultations:                 Critical Care: This patient's ED course included: a personal history and physicial eaxmination    This patient has been closely monitored during their ED course. Counseling: The emergency provider has spoken with the patient and discussed todays results, in addition to providing specific details for the plan of care and counseling regarding the diagnosis and prognosis. Questions are answered at this time and they are agreeable with the plan.       --------------------------------- IMPRESSION AND DISPOSITION ---------------------------------    IMPRESSION  1. Hallucinations    2. Urinary tract infection without hematuria, site unspecified        DISPOSITION  Disposition: Worker to evaluate  Patient condition is stable        NOTE: This report was transcribed using voice recognition software.  Every effort was made to ensure accuracy; however, inadvertent computerized transcription errors may be present          Sindhu Meza MD  02/12/20 [facility],

## 2020-02-13 NOTE — ED NOTES
PT stated that this is the usual time she wakes up and requested her clothes to \"get dressed for the day and make her bed\". PT was advised that she was unable to have her clothes at this time but if it made her comfortable she could make her bed. PT requested washcloth to wash her face. PT given washcloth and walked to the bathroom w/ staff assistance.       Catrina Martínez RN  02/13/20 [id number]

## 2020-02-13 NOTE — ED PROVIDER NOTES
Department of Emergency Medicine   ED  Provider Note  Admit Date/RoomTime: 2/12/2020 10:26 PM  ED Room: [address] [place] [address]      History of Present Illness:  2/12/20, Time: 10:33 PM         Karo Guo is a 62 y.o. female presenting to the ED for hallucinations, beginning 2 days ago. The complaint has been persistent, mild in severity, and worsened by nothing. Pt with history of bipolar, CKD, COPD, depression, HLD, and schizoaffective disorder presents with hallucinations that began 2 days ago. She states they have been getting worse. Pt denies HI or SI. Pt denies CP, SOB, fever, HA, nausea, vomiting, diarrhea, lightheadedness, abdominal pain, or hematochezia. Review of Systems:   Pertinent positives and negatives are stated within HPI, all other systems reviewed and are negative.    --------------------------------------------- PAST HISTORY ---------------------------------------------  Past Medical History:  has a past medical history of Anxiety, Arthritis, Bipolar 2 disorder ([address] Chronic back pain, CKD, Constipation, Continuous urine leakage, COPD (chronic obstructive pulmonary disease) ([address] Depression, Dysphagia, GERD (gastroesophageal reflux disease), History of cardiovascular stress test, History of cardiovascular stress test, History of cardiovascular stress test, Hx of cardiovascular stress test, Hyperlipidemia, MDRO (multiple drug resistant organisms) resistance, Menopausal symptoms, Mitral valve prolapse, Nausea & vomiting, Neurogenic bladder, Obstructive sleep apnea, EMILEE treated with BiPAP, Overactive bladder, Pulmonary embolism ([address] PVD (peripheral vascular disease) ([address] Schizoaffective disorder ([address] Seizure disorder ([address] Thyroid disease, and Type 2 diabetes mellitus, without long-term current use of insulin ([address]     Past Surgical History:  has a past surgical history that includes Hysterectomy; hip surgery (Right); Bladder surgery; Vena Cava Filter Placement; ECHO Compl W Dop Color Flow (10/10/2012); Appendectomy; Tonsillectomy; Colonoscopy; Endoscopy, colon, diagnostic; Knee arthroscopy (10-26-12); Hammer toe surgery; Breast surgery; back surgery; other surgical history; Upper gastrointestinal endoscopy; other surgical history (Bilateral, JULY 11, 2013); Diagnostic Cardiac Cath Lab Procedure (7111/2719); cyst removal (2006); other surgical history (Left, 7-22-15); pr arthdsis post/posterlatrl/postintrbdyadl spc/seg (N/A, 7/16/2018); Finger trigger release (Right, 03/22/2019); Finger trigger release (Right, 3/22/2019); Nerve Block (Right, 05/20/2019); Anesthesia Nerve Block (Right, 5/20/2019); epidural steroid injection (Right, 6/18/2019); Nerve Block (Right, 10/07/2019); and Anesthesia Nerve Block (Right, 10/7/2019). Social History:  reports that she quit smoking about 18 years ago. Her smoking use included cigarettes. She quit after 25.00 years of use. She has never used smokeless tobacco. She reports that she does not drink alcohol or use drugs. Family History: family history includes Breast Cancer in her maternal aunt, maternal aunt, and mother; Cancer in her mother; Cancer (age of onset: 77) in her father; Colon Cancer in her father; Heart Attack in her mother; Other in her brother, brother, and mother. The patients home medications have been reviewed. Allergies: Penicillins; Demerol; Depakote [divalproex sodium]; Haldol [haloperidol lactate]; and Sulfa antibiotics    ---------------------------------------------------PHYSICAL EXAM--------------------------------------    Constitutional/General: Alert and oriented x3, well appearing, non toxic in NAD  Head: Normocephalic and atraumatic  Eyes: PERRL, EOMI, conjunctiva normal, sclera non icteric  Mouth: Oropharynx clear  Neck: Supple  Respiratory: Lungs clear to auscultation bilaterally, no wheezes, rales, or rhonchi. Not in respiratory distress  Cardiovascular:  Regular rate. Regular rhythm. No murmurs, gallops, or rubs.  2+ distal pulses  Chest: No chest wall tenderness  GI:  Abdomen Soft, Non tender, Non distended. +BS. No rebound, guarding, or rigidity. No pulsatile masses. Musculoskeletal: Moves all extremities x 4. Warm and well perfused, no clubbing, cyanosis, or edema. Integument: Skin warm and dry. No rashes. Neurologic: GCS 15, no focal deficits, symmetric strength 5/5 in the upper and lower extremities bilaterally  Psychiatric: Normal Affect    -------------------------------------------------- RESULTS -------------------------------------------------  I have personally reviewed all laboratory and imaging results for this patient. Results are listed below. LABS:  No results found for this visit on 02/12/20. RADIOLOGY:  Interpreted by Radiologist.  No orders to display           ------------------------- NURSING NOTES AND VITALS REVIEWED ---------------------------   The nursing notes within the ED encounter and vital signs as below have been reviewed by myself. /76   Pulse 66   Temp 97.8 °F (36.6 °C)   Resp 14   Ht 5' 6\" (1.676 m)   Wt 210 lb (95.3 kg)   SpO2 95%   BMI 33.89 kg/m²   Oxygen Saturation Interpretation: Normal    The patients available past medical records and past encounters were reviewed. ------------------------------ ED COURSE/MEDICAL DECISION MAKING----------------------  Medications - No data to display    Medical Decision Making: This patient's ED course included: a personal history and physicial examination, re-evaluation prior to disposition and multiple bedside re-evaluations    This patient has remained hemodynamically stable and been closely monitored during their ED course. Re-Evaluations:           Re-evaluation. Patients symptoms show no change    Consultations:                 Counseling:    The emergency provider has spoken with the patient and discussed todays results, in addition to providing specific details for the plan of care and counseling regarding the diagnosis and prognosis. Questions are answered at this time and they are agreeable with the plan.     --------------------------------- IMPRESSION AND DISPOSITION ---------------------------------    IMPRESSION  No diagnosis found. DISPOSITION  Disposition:   Patient condition is stable    2/12/20, 10:33 PM.    This note is prepared by Jody Coe, acting as Scribe for Yane Lopez MD.    Yane Lopez MD:  The scribe's documentation has been prepared under my direction and personally reviewed by me in its entirety. I confirm that the note above accurately reflects all work, treatment, procedures, and medical decision making performed by me.         Yane Lopez MD  02/12/20 [id number]

## 2020-03-04 ENCOUNTER — OFFICE VISIT (OUTPATIENT)
Dept: PAIN MANAGEMENT | Age: 65
End: 2020-03-04
Payer: MEDICAID

## 2020-03-04 VITALS
WEIGHT: 215 LBS | HEART RATE: 60 BPM | RESPIRATION RATE: 20 BRPM | HEIGHT: 66 IN | SYSTOLIC BLOOD PRESSURE: 116 MMHG | TEMPERATURE: 97.8 F | BODY MASS INDEX: 34.55 KG/M2 | DIASTOLIC BLOOD PRESSURE: 62 MMHG

## 2020-03-04 PROCEDURE — 99213 OFFICE O/P EST LOW 20 MIN: CPT | Performed by: PHYSICIAN ASSISTANT
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
## 2020-03-10 ENCOUNTER — TELEPHONE (OUTPATIENT)
Dept: PAIN MANAGEMENT | Age: 65
End: 2020-03-10

## 2020-03-11 ENCOUNTER — TELEPHONE (OUTPATIENT)
Dept: ORTHOPEDIC SURGERY | Age: 65
End: 2020-03-11

## 2020-03-16 ENCOUNTER — TELEPHONE (OUTPATIENT)
Dept: ORTHOPEDIC SURGERY | Age: 65
End: 2020-03-16

## 2020-03-30 ENCOUNTER — TELEPHONE (OUTPATIENT)
Dept: CARDIOLOGY CLINIC | Age: 65
End: 2020-03-30

## 2020-05-12 ENCOUNTER — TELEPHONE (OUTPATIENT)
Dept: ORTHOPEDIC SURGERY | Age: 65
End: 2020-05-12

## 2020-05-18 ENCOUNTER — TELEPHONE (OUTPATIENT)
Dept: CARDIOLOGY CLINIC | Age: 65
End: 2020-05-18

## 2020-06-01 ENCOUNTER — OFFICE VISIT (OUTPATIENT)
Dept: ORTHOPEDIC SURGERY | Age: 65
End: 2020-06-01
Payer: MEDICAID

## 2020-06-01 VITALS — BODY MASS INDEX: 34.55 KG/M2 | HEIGHT: 66 IN | WEIGHT: 215 LBS

## 2020-06-01 PROCEDURE — 99214 OFFICE O/P EST MOD 30 MIN: CPT | Performed by: ORTHOPAEDIC SURGERY

## 2020-06-01 NOTE — PROGRESS NOTES
LUMBAR EPIDURAL AT L5 AND S1 #3 (CPT 20331) performed by Isai Bauer MD at [place] [address]      lumbar fusion   [address] [facility],  [address]      multiple   d/t  incontinence    BREAST SURGERY      bx neg lt    COLONOSCOPY      CYST REMOVAL  2006    vulvular sebaceous cyst excision    DIAGNOSTIC CARDIAC CATH LAB PROCEDURE  1996/1997    showed \"mitral valve prolapse\", records not available     ECHO COMPL W DOP COLOR FLOW  10/10/2012         ENDOSCOPY, COLON, DIAGNOSTIC      EPIDURAL STEROID INJECTION Right 6/18/2019    RIGHT TRANSFORAMINAL L4 AND L5 EPIDURAL STEROID INJECTION #2 performed by Isai Bauer MD at [facility] Right 03/22/2019    FINGER TRIGGER RELEASE Right 3/22/2019    RIGHT HAND RING FINGER TRIGGER FINGER RELEASE performed by Albaro Pizarro DO at [address] [place] [address]    HIP SURGERY Right      screws and rods     HYSTERECTOMY      KNEE ARTHROSCOPY  10-26-12    left    NERVE BLOCK Right 05/20/2019    NERVE BLOCK Right 10/07/2019    right transforaminal epidural at L5 and S1 #3    OTHER SURGICAL HISTORY      supra pubic tubing for 1 1/2 yrs then removed    OTHER SURGICAL HISTORY Bilateral JULY 11, 2013    BILAT MAXILLARY ANTROSTOMY,BILATERAL  INFERIOR TURBINATE CAUTERY AND LATERALIZATION,ENDOSCOPIC EXCISION BILATERAL MIDDLE TURBINATE ELVIS BULLOSA     OTHER SURGICAL HISTORY Left 7-22-15    left knee arthroscopy partial lateral menisectomy chrondroplasty partial synovectomy     LA ARTHDSIS POST/POSTERLATRL/POSTINTRBDYADL SPC/SEG N/A 7/16/2018    L3-4M L4-5, L5-S1 POSTERIOR LUMBAR INTERBODY FUSION --OARM, AUDIOLOGY, CAGES, PLATES, SCREWS, CRISS, FLORENTIN TABLE, CELL SAVER, PLATELET GEL, MEDTRONIC performed by Brii Gay MD at [address] [place] [address] ENDOSCOPY      VENA CAVA FILTER PLACEMENT         Current Outpatient Medications:     Lidocaine (ZTLIDO) 1.8 % PTCH, 12 hours on 12 hours off, Disp: 9 patch, Rfl: 0    acetaminophen (TYLENOL) 500 MG tablet, Take 1,000 mg by mouth every 8 hours as needed for Pain, Disp: , Rfl:     lubiprostone (AMITIZA) 24 MCG capsule, Take 24 mcg by mouth 2 times daily (with meals), Disp: , Rfl:     famotidine (PEPCID) 40 MG tablet, Take 40 mg by mouth nightly, Disp: , Rfl:     fluticasone (FLONASE) 50 MCG/ACT nasal spray, 2 sprays by Each Nostril route every morning, Disp: , Rfl:     senna (SENOKOT) 8.6 MG tablet, Take 1 tablet by mouth nightly as needed for Constipation, Disp: , Rfl:     Multiple Vitamins-Minerals (HAIR SKIN AND NAILS FORMULA) TABS, Take 1 capsule by mouth every morning, Disp: , Rfl:     paliperidone (INVEGA) 3 MG extended release tablet, Take 3 mg by mouth nightly, Disp: , Rfl:     oxyCODONE-acetaminophen (PERCOCET)  MG per tablet, Take 1 tablet by mouth 3 times daily as needed for Pain., Disp: , Rfl:     polyethyl glycol-propyl glycol 0.4-0.3 % (SYSTANE) 0.4-0.3 % ophthalmic solution, 1 drop 4 times daily, Disp: , Rfl:     folic acid-pyridoxine-cyanocobalamine (FOLTX) 2.5-25-1 MG TABS tablet, Take 1 tablet by mouth daily, Disp: , Rfl:     acetaminophen-codeine (TYLENOL #3) 300-30 MG per tablet, take 2 tabs y mout 4 timeas a day as needed, Disp: , Rfl:     nitroGLYCERIN (NITROSTAT) 0.4 MG SL tablet, Place 1 tablet under the tongue every 5 minutes as needed for Chest pain, Disp: 25 tablet, Rfl: 5    Melatonin-Pyridoxine 5-1 MG TABS, Take by mouth, Disp: , Rfl:     Propylene Glycol (SYSTANE BALANCE) 0.6 % SOLN, Apply to eye, Disp: , Rfl:     sodium chloride (DEEP SEA NASAL SPRAY) 0.65 % nasal spray, by Nasal route , Disp: , Rfl:     aspirin 81 MG chewable tablet, Take 1 tablet by mouth daily, Disp: 30 tablet, Rfl: 3    triamcinolone (KENALOG) 0.025 % cream, Apply Topically once per week to external ears (Patient not taking: Reported on 3/4/2020), Disp: 1 Tube, Rfl: 0    vitamin D (ERGOCALCIFEROL) 92304 units CAPS capsule, Take 50,000 Units by mouth once a week, Disp: , Rfl:     loperamide (IMODIUM) 2 MG capsule, Take 2 mg by mouth 4 times daily as needed for Diarrhea, Disp: , Rfl:     pantoprazole sodium (PROTONIX) 40 MG PACK packet, Take 40 mg by mouth every morning (before breakfast), Disp: , Rfl:     calcium carbonate (TUMS) 500 MG chewable tablet, Take 2 tablets by mouth 4 times daily as needed for Heartburn, Disp: , Rfl:     fluPHENAZine HCl (PROLIXIN) 10 MG tablet, Take 10 mg by mouth 2 times daily TAKES AN EXTRA 1/2 TAB AT BEDTIME, Disp: , Rfl:     ARIPiprazole (ABILIFY) 30 MG tablet, Take 30 mg by mouth daily, Disp: , Rfl:     metFORMIN (GLUCOPHAGE) 500 MG tablet, Take 500 mg by mouth daily (with breakfast), Disp: , Rfl:     albuterol sulfate  (90 Base) MCG/ACT inhaler, Inhale 2 puffs into the lungs every 6 hours as needed for Wheezing or Shortness of Breath UP TO 4 TIMES A DAY, Disp: , Rfl:     LORazepam (ATIVAN) 0.5 MG tablet, Take 0.5 mg by mouth 2 times daily. , Disp: , Rfl:     oxybutynin (DITROPAN) 5 MG tablet, Take 5 mg by mouth 3 times daily, Disp: , Rfl:     venlafaxine (EFFEXOR XR) 150 MG extended release capsule, Take 225 mg by mouth daily , Disp: , Rfl:     polyvinyl alcohol (ARTIFICIAL TEARS) 1.4 % ophthalmic solution, Place 1 drop into both eyes 4 times daily , Disp: , Rfl:     gabapentin (NEURONTIN) 100 MG capsule, Take 100 mg by mouth 3 times daily , Disp: , Rfl:     benztropine (COGENTIN) 0.5 MG tablet, Take 0.5 mg by mouth 3 times daily , Disp: , Rfl:     levothyroxine (SYNTHROID) 25 MCG tablet, TAKE 1 TABLET BY MOUTH DAILY. , Disp: 30 tablet, Rfl: 4    topiramate (TOPAMAX) 100 MG tablet, TAKE 1 TABLET BY MOUTH 2 TIMES DAILY. (Patient taking differently: BID), Disp: 60 tablet, Rfl: 4    docusate sodium (DOCQLACE) 100 MG capsule, TAKE 1 CAPSULE BY MOUTH DAILY.  (Patient taking differently: Take 100 mg by mouth daily TAKE 1 CAPSULE BY MOUTH DAILY.), Disp: 30 capsule, Rfl: 3  Allergies   Allergen Reactions    Penicillins Anaphylaxis    Demerol Other (See Comments)     hypotension    Depakote [Divalproex Sodium]      ?     Haldol [Haloperidol Lactate] Other (See Comments)     shakes    Sulfa Antibiotics Nausea And Vomiting     Social History     Socioeconomic History    Marital status:      Spouse name: Not on file    Number of children: Not on file    Years of education: Not on file    Highest education level: Not on file   Occupational History    Not on file   Social Needs    Financial resource strain: Not on file    Food insecurity     Worry: Not on file     Inability: Not on file    Transportation needs     Medical: Not on file     Non-medical: Not on file   Tobacco Use    Smoking status: Former Smoker     Years: 25.00     Types: Cigarettes     Last attempt to quit: 2002     Years since quittin.4    Smokeless tobacco: Never Used   Substance and Sexual Activity    Alcohol use: No     Comment: quit . 4 cups coffee per day, 2 glasses tea daily    Drug use: No     Comment: quit in  all the other drugs except marijuana that was     Sexual activity: Never   Lifestyle    Physical activity     Days per week: Not on file     Minutes per session: Not on file    Stress: Not on file   Relationships    Social connections     Talks on phone: Not on file     Gets together: Not on file     Attends Taoist service: Not on file     Active member of club or organization: Not on file     Attends meetings of clubs or organizations: Not on file     Relationship status: Not on file    Intimate partner violence     Fear of current or ex partner: Not on file     Emotionally abused: Not on file     Physically abused: Not on file     Forced sexual activity: Not on file   Other Topics Concern    Not on file   Social History Narrative    Not on file     Family History   Problem Relation Age of Onset    Colon Cancer Father     Cancer Father 77        colon negative,   PF grind test negative, Apprehension test negative,  Patellar J sign  negative    Xrays:   Mild tricompartmental degenerative changes are noted,   greatest in the medial compartment. There is a small joint effusion. There is normal alignment. The patella is well located.  No fractures   are identified. MRI:   Not performed. Radiographic findings reviewed with patient    Impression:  Encounter Diagnosis   Name Primary?  Primary osteoarthritis of right knee Yes       Plan:   Natural history and expected course discussed. Questions answered. Educational materials distributed. Rest, ice, compression, and elevation (RICE) therapy. I I had a lengthy discussion with the patient regarding their diagnosis. I explained treatment options including surgical vs non surgical treatment. I reviewed in detail the risks and benefits and outlined the procedure in detail with expected outcomes and possible complications. I also discussed non surgical treatment such as injections (CSI and visco supplementation), physical therapy, topical creams and NSAID's. They have elected for surgical management at this time. The risks and benefits of a knee arthroscopy were discussed with the patient. The risks are including but not limited to: infection, injuries to blood vessels and nerves, non relief of symptoms, arthrofibrosis of knee, need for further operative intervention, blood loss, PE/DVT, MI and death. The risks are understood by the patient and they wish to proceed with a Right knee arthroscopy, medial menisectomy and debridement on 6/10/2020  The patient was counseled at length about the risks of manolo Covid-19 during their perioperative period and any recovery window from their procedure. The patient was made aware that manolo Covid-19  may worsen their prognosis for recovering from their procedure  and lend to a higher morbidity and/or mortality risk.   All material risks, benefits, and

## 2020-06-05 ENCOUNTER — HOSPITAL ENCOUNTER (OUTPATIENT)
Age: 65
Discharge: HOME | End: 2020-06-07
Payer: MEDICAID

## 2020-06-05 PROCEDURE — U0003 INFECTIOUS AGENT DETECTION BY NUCLEIC ACID (DNA OR RNA); SEVERE ACUTE RESPIRATORY SYNDROME CORONAVIRUS 2 (SARS-COV-2) (CORONAVIRUS DISEASE [COVID-19]), AMPLIFIED PROBE TECHNIQUE, MAKING USE OF HIGH THROUGHPUT TECHNOLOGIES AS DESCRIBED BY CMS-2020-01-R: HCPCS

## 2020-06-07 LAB
SARS-COV-2: NOT DETECTED
SOURCE: NORMAL

## 2020-06-09 RX ORDER — OMEPRAZOLE 40 MG/1
40 CAPSULE, DELAYED RELEASE ORAL DAILY
COMMUNITY

## 2020-06-09 RX ORDER — CLONAZEPAM 0.5 MG/1
0.5 TABLET ORAL 2 TIMES DAILY
COMMUNITY

## 2020-06-10 ENCOUNTER — HOSPITAL ENCOUNTER (OUTPATIENT)
Age: 65
Setting detail: OUTPATIENT SURGERY
Discharge: HOME | End: 2020-06-10
Attending: ORTHOPAEDIC SURGERY | Admitting: ORTHOPAEDIC SURGERY
Payer: MEDICAID

## 2020-06-10 ENCOUNTER — ANESTHESIA (OUTPATIENT)
Dept: OPERATING ROOM | Age: 65
End: 2020-06-10
Payer: MEDICAID

## 2020-06-10 ENCOUNTER — ANESTHESIA EVENT (OUTPATIENT)
Dept: OPERATING ROOM | Age: 65
End: 2020-06-10
Payer: MEDICAID

## 2020-06-10 VITALS
BODY MASS INDEX: 34.43 KG/M2 | WEIGHT: 214.25 LBS | OXYGEN SATURATION: 96 % | SYSTOLIC BLOOD PRESSURE: 120 MMHG | HEIGHT: 66 IN | DIASTOLIC BLOOD PRESSURE: 80 MMHG | HEART RATE: 62 BPM | RESPIRATION RATE: 20 BRPM | TEMPERATURE: 96.4 F

## 2020-06-10 VITALS
DIASTOLIC BLOOD PRESSURE: 50 MMHG | TEMPERATURE: 96 F | OXYGEN SATURATION: 98 % | SYSTOLIC BLOOD PRESSURE: 89 MMHG | RESPIRATION RATE: 14 BRPM

## 2020-06-10 LAB
ANION GAP SERPL CALCULATED.3IONS-SCNC: 13 MMOL/L (ref 7–16)
BUN BLDV-MCNC: 17 MG/DL (ref 8–23)
CALCIUM SERPL-MCNC: 9.9 MG/DL (ref 8.6–10.2)
CHLORIDE BLD-SCNC: 108 MMOL/L (ref 98–107)
CO2: 22 MMOL/L (ref 22–29)
CREAT SERPL-MCNC: 1 MG/DL (ref 0.5–1)
GFR AFRICAN AMERICAN: >60
GFR NON-AFRICAN AMERICAN: 56 ML/MIN/1.73
GLUCOSE BLD-MCNC: 114 MG/DL (ref 74–99)
HCT VFR BLD CALC: 40 % (ref 34–48)
HEMOGLOBIN: 13.3 G/DL (ref 11.5–15.5)
MCH RBC QN AUTO: 30.7 PG (ref 26–35)
MCHC RBC AUTO-ENTMCNC: 33.3 % (ref 32–34.5)
MCV RBC AUTO: 92.4 FL (ref 80–99.9)
PDW BLD-RTO: 13.8 FL (ref 11.5–15)
PLATELET # BLD: 152 E9/L (ref 130–450)
PMV BLD AUTO: 11.4 FL (ref 7–12)
POTASSIUM REFLEX MAGNESIUM: 3.9 MMOL/L (ref 3.5–5)
RBC # BLD: 4.33 E12/L (ref 3.5–5.5)
SODIUM BLD-SCNC: 143 MMOL/L (ref 132–146)
WBC # BLD: 6.5 E9/L (ref 4.5–11.5)

## 2020-06-10 PROCEDURE — 7100000001 HC PACU RECOVERY - ADDTL 15 MIN: Performed by: ORTHOPAEDIC SURGERY

## 2020-06-10 PROCEDURE — 2580000003 HC RX 258: Performed by: ORTHOPAEDIC SURGERY

## 2020-06-10 PROCEDURE — 3600000003 HC SURGERY LEVEL 3 BASE: Performed by: ORTHOPAEDIC SURGERY

## 2020-06-10 PROCEDURE — 7100000000 HC PACU RECOVERY - FIRST 15 MIN: Performed by: ORTHOPAEDIC SURGERY

## 2020-06-10 PROCEDURE — 3700000001 HC ADD 15 MINUTES (ANESTHESIA): Performed by: ORTHOPAEDIC SURGERY

## 2020-06-10 PROCEDURE — 6370000000 HC RX 637 (ALT 250 FOR IP): Performed by: ANESTHESIOLOGY

## 2020-06-10 PROCEDURE — 29877 ARTHRS KNEE SURG DBRDMT/SHVG: CPT | Performed by: ORTHOPAEDIC SURGERY

## 2020-06-10 PROCEDURE — 80048 BASIC METABOLIC PNL TOTAL CA: CPT

## 2020-06-10 PROCEDURE — 2500000003 HC RX 250 WO HCPCS: Performed by: STUDENT IN AN ORGANIZED HEALTH CARE EDUCATION/TRAINING PROGRAM

## 2020-06-10 PROCEDURE — 85027 COMPLETE CBC AUTOMATED: CPT

## 2020-06-10 PROCEDURE — 7100000010 HC PHASE II RECOVERY - FIRST 15 MIN: Performed by: ORTHOPAEDIC SURGERY

## 2020-06-10 PROCEDURE — 6360000002 HC RX W HCPCS

## 2020-06-10 PROCEDURE — 3700000000 HC ANESTHESIA ATTENDED CARE: Performed by: ORTHOPAEDIC SURGERY

## 2020-06-10 PROCEDURE — 6360000002 HC RX W HCPCS: Performed by: ORTHOPAEDIC SURGERY

## 2020-06-10 PROCEDURE — 2580000003 HC RX 258: Performed by: ANESTHESIOLOGY

## 2020-06-10 PROCEDURE — 7100000011 HC PHASE II RECOVERY - ADDTL 15 MIN: Performed by: ORTHOPAEDIC SURGERY

## 2020-06-10 PROCEDURE — 2500000003 HC RX 250 WO HCPCS: Performed by: ORTHOPAEDIC SURGERY

## 2020-06-10 PROCEDURE — 36415 COLL VENOUS BLD VENIPUNCTURE: CPT

## 2020-06-10 PROCEDURE — 3600000013 HC SURGERY LEVEL 3 ADDTL 15MIN: Performed by: ORTHOPAEDIC SURGERY

## 2020-06-10 PROCEDURE — 2709999900 HC NON-CHARGEABLE SUPPLY: Performed by: ORTHOPAEDIC SURGERY

## 2020-06-10 RX ORDER — OXYCODONE HYDROCHLORIDE AND ACETAMINOPHEN 5; 325 MG/1; MG/1
1 TABLET ORAL EVERY 6 HOURS PRN
Qty: 28 TABLET | Refills: 0 | Status: SHIPPED | OUTPATIENT
Start: 2020-06-10 | End: 2020-06-10 | Stop reason: HOSPADM

## 2020-06-10 RX ORDER — OXYCODONE HYDROCHLORIDE AND ACETAMINOPHEN 5; 325 MG/1; MG/1
1 TABLET ORAL EVERY 6 HOURS PRN
Qty: 28 TABLET | Refills: 0 | Status: SHIPPED | OUTPATIENT
Start: 2020-06-10 | End: 2020-06-17

## 2020-06-10 RX ORDER — PROMETHAZINE HYDROCHLORIDE 25 MG/ML
6.25 INJECTION, SOLUTION INTRAMUSCULAR; INTRAVENOUS
Status: DISCONTINUED | OUTPATIENT
Start: 2020-06-10 | End: 2020-06-10 | Stop reason: HOSPADM

## 2020-06-10 RX ORDER — MORPHINE SULFATE 2 MG/ML
2 INJECTION, SOLUTION INTRAMUSCULAR; INTRAVENOUS EVERY 5 MIN PRN
Status: DISCONTINUED | OUTPATIENT
Start: 2020-06-10 | End: 2020-06-10 | Stop reason: HOSPADM

## 2020-06-10 RX ORDER — HYDROCODONE BITARTRATE AND ACETAMINOPHEN 5; 325 MG/1; MG/1
1 TABLET ORAL PRN
Status: COMPLETED | OUTPATIENT
Start: 2020-06-10 | End: 2020-06-10

## 2020-06-10 RX ORDER — ONDANSETRON 2 MG/ML
INJECTION INTRAMUSCULAR; INTRAVENOUS PRN
Status: DISCONTINUED | OUTPATIENT
Start: 2020-06-10 | End: 2020-06-10 | Stop reason: SDUPTHER

## 2020-06-10 RX ORDER — PROPOFOL 10 MG/ML
INJECTION, EMULSION INTRAVENOUS PRN
Status: DISCONTINUED | OUTPATIENT
Start: 2020-06-10 | End: 2020-06-10 | Stop reason: SDUPTHER

## 2020-06-10 RX ORDER — LIDOCAINE HYDROCHLORIDE 20 MG/ML
INJECTION, SOLUTION INTRAVENOUS PRN
Status: DISCONTINUED | OUTPATIENT
Start: 2020-06-10 | End: 2020-06-10 | Stop reason: SDUPTHER

## 2020-06-10 RX ORDER — SODIUM CHLORIDE 9 MG/ML
INJECTION, SOLUTION INTRAVENOUS CONTINUOUS
Status: DISCONTINUED | OUTPATIENT
Start: 2020-06-10 | End: 2020-06-10 | Stop reason: HOSPADM

## 2020-06-10 RX ORDER — FENTANYL CITRATE 50 UG/ML
INJECTION, SOLUTION INTRAMUSCULAR; INTRAVENOUS PRN
Status: DISCONTINUED | OUTPATIENT
Start: 2020-06-10 | End: 2020-06-10 | Stop reason: SDUPTHER

## 2020-06-10 RX ORDER — FENTANYL CITRATE 50 UG/ML
25 INJECTION, SOLUTION INTRAMUSCULAR; INTRAVENOUS EVERY 5 MIN PRN
Status: DISCONTINUED | OUTPATIENT
Start: 2020-06-10 | End: 2020-06-10 | Stop reason: HOSPADM

## 2020-06-10 RX ORDER — METOCLOPRAMIDE HYDROCHLORIDE 5 MG/ML
INJECTION INTRAMUSCULAR; INTRAVENOUS PRN
Status: DISCONTINUED | OUTPATIENT
Start: 2020-06-10 | End: 2020-06-10 | Stop reason: SDUPTHER

## 2020-06-10 RX ORDER — BUPIVACAINE HYDROCHLORIDE AND EPINEPHRINE 2.5; 5 MG/ML; UG/ML
INJECTION, SOLUTION EPIDURAL; INFILTRATION; INTRACAUDAL; PERINEURAL PRN
Status: DISCONTINUED | OUTPATIENT
Start: 2020-06-10 | End: 2020-06-10 | Stop reason: ALTCHOICE

## 2020-06-10 RX ORDER — HYDROCODONE BITARTRATE AND ACETAMINOPHEN 5; 325 MG/1; MG/1
2 TABLET ORAL PRN
Status: COMPLETED | OUTPATIENT
Start: 2020-06-10 | End: 2020-06-10

## 2020-06-10 RX ORDER — MIDAZOLAM HYDROCHLORIDE 1 MG/ML
INJECTION INTRAMUSCULAR; INTRAVENOUS PRN
Status: DISCONTINUED | OUTPATIENT
Start: 2020-06-10 | End: 2020-06-10 | Stop reason: SDUPTHER

## 2020-06-10 RX ORDER — CLINDAMYCIN PHOSPHATE 900 MG/50ML
900 INJECTION INTRAVENOUS ONCE
Status: COMPLETED | OUTPATIENT
Start: 2020-06-10 | End: 2020-06-10

## 2020-06-10 RX ADMIN — CLINDAMYCIN PHOSPHATE 900 MG: 18 INJECTION, SOLUTION INTRAVENOUS at 12:37

## 2020-06-10 RX ADMIN — PROPOFOL 200 MG: 10 INJECTION, EMULSION INTRAVENOUS at 12:35

## 2020-06-10 RX ADMIN — ONDANSETRON 4 MG: 2 INJECTION INTRAMUSCULAR; INTRAVENOUS at 13:21

## 2020-06-10 RX ADMIN — MIDAZOLAM 2 MG: 1 INJECTION INTRAMUSCULAR; INTRAVENOUS at 12:28

## 2020-06-10 RX ADMIN — FENTANYL CITRATE 50 MCG: 50 INJECTION, SOLUTION INTRAMUSCULAR; INTRAVENOUS at 13:14

## 2020-06-10 RX ADMIN — CLINDAMYCIN PHOSPHATE 900 MG: 18 INJECTION, SOLUTION INTRAVENOUS at 12:43

## 2020-06-10 RX ADMIN — METOCLOPRAMIDE 10 MG: 5 INJECTION, SOLUTION INTRAMUSCULAR; INTRAVENOUS at 12:31

## 2020-06-10 RX ADMIN — LIDOCAINE HYDROCHLORIDE 60 MG: 20 INJECTION, SOLUTION INTRAVENOUS at 12:35

## 2020-06-10 RX ADMIN — SODIUM CHLORIDE: 9 INJECTION, SOLUTION INTRAVENOUS at 10:33

## 2020-06-10 RX ADMIN — HYDROCODONE BITARTRATE AND ACETAMINOPHEN 1 TABLET: 5; 325 TABLET ORAL at 15:07

## 2020-06-10 RX ADMIN — FENTANYL CITRATE 100 MCG: 50 INJECTION, SOLUTION INTRAMUSCULAR; INTRAVENOUS at 12:35

## 2020-06-10 ASSESSMENT — PULMONARY FUNCTION TESTS
PIF_VALUE: 1
PIF_VALUE: 12
PIF_VALUE: 13
PIF_VALUE: 0
PIF_VALUE: 13
PIF_VALUE: 12
PIF_VALUE: 12
PIF_VALUE: 13
PIF_VALUE: 12
PIF_VALUE: 1
PIF_VALUE: 0
PIF_VALUE: 13
PIF_VALUE: 13
PIF_VALUE: 12
PIF_VALUE: 13
PIF_VALUE: 12
PIF_VALUE: 13
PIF_VALUE: 13
PIF_VALUE: 12
PIF_VALUE: 12
PIF_VALUE: 13
PIF_VALUE: 12
PIF_VALUE: 13
PIF_VALUE: 0
PIF_VALUE: 13
PIF_VALUE: 12
PIF_VALUE: 13
PIF_VALUE: 12
PIF_VALUE: 0
PIF_VALUE: 14
PIF_VALUE: 13
PIF_VALUE: 12
PIF_VALUE: 13
PIF_VALUE: 1
PIF_VALUE: 13
PIF_VALUE: 12
PIF_VALUE: 6
PIF_VALUE: 19
PIF_VALUE: 1
PIF_VALUE: 0
PIF_VALUE: 13
PIF_VALUE: 12
PIF_VALUE: 13
PIF_VALUE: 13
PIF_VALUE: 2
PIF_VALUE: 13
PIF_VALUE: 0
PIF_VALUE: 4
PIF_VALUE: 15
PIF_VALUE: 13
PIF_VALUE: 13
PIF_VALUE: 0
PIF_VALUE: 13
PIF_VALUE: 12
PIF_VALUE: 13
PIF_VALUE: 1

## 2020-06-10 ASSESSMENT — PAIN SCALES - GENERAL
PAINLEVEL_OUTOF10: 8
PAINLEVEL_OUTOF10: 0
PAINLEVEL_OUTOF10: 3
PAINLEVEL_OUTOF10: 0
PAINLEVEL_OUTOF10: 8

## 2020-06-10 ASSESSMENT — PAIN DESCRIPTION - LOCATION
LOCATION: KNEE
LOCATION: KNEE

## 2020-06-10 ASSESSMENT — PAIN DESCRIPTION - DESCRIPTORS
DESCRIPTORS: ACHING
DESCRIPTORS: ACHING

## 2020-06-10 ASSESSMENT — PAIN DESCRIPTION - ORIENTATION
ORIENTATION: RIGHT
ORIENTATION: RIGHT

## 2020-06-10 ASSESSMENT — PAIN - FUNCTIONAL ASSESSMENT: PAIN_FUNCTIONAL_ASSESSMENT: 0-10

## 2020-06-10 ASSESSMENT — PAIN DESCRIPTION - PAIN TYPE
TYPE: SURGICAL PAIN

## 2020-06-10 NOTE — OP NOTE
Operative Note      Patient: Claudio Monsalve  YOB: 1955  MRN: [id number]    Date of Procedure: 6/10/2020    Pre-Op Diagnosis: DJD RIGHT KNEE    Post-Op Diagnosis: Same       Procedure(s):  RIGHT KNEE ARTHROSCOPY WITH chondroplasty, DEBRIDEMENT, SYNOVECTOMY     Surgeon(s): Hart Gowers, DO    Assistant:   Resident: Narayan Guy DO; Jovon Kwon DO; Riccardo Lombardo DO    Anesthesia: General    Estimated Blood Loss (mL): Minimal    Complications: None    Specimens:   * No specimens in log *    Implants:  * No implants in log *      Drains: * No LDAs found *    Findings: as above    Detailed Description of Procedure:   Below    DATE OF PROCEDURE: 6/10/20  SURGEON: Daylin Mcfarland D.O.   ASSISTANT:ana  PREOPERATIVE DIAGNOSES: (1) Right knee Tricompartmental synovitis. (2) DJD knee   POSTOPERATIVE DIAGNOSIS: Same as above   PROCEDURE: (1) Right knee arthroscopy with  Tricompartmental synovectomy. (2) Chondroplasty of mfc/lfc/patella/trochlea  ANESTHESIA: general  ESTIMATED BLOOD LOSS: Minimal.   COMPLICATIONS: None. OPERATIVE PROCEDURE: The patient was taken to the operative suite and was   given general anesthesia. The Right knee was identified with preoperative time-out. I applied a tourniquet to the  thigh, placed the  leg in a legholder, prepped and draped the leg in sterile fashion. I outlined an   incision along the anteromedial and anterolateral aspects of the knee. An incision was then made in the anterior medial and lateral aspects of the knee with an 11 blade. A blunt trocar was then inserted within the knee and I carried out a diagnostic arthroscopy. The patient had evidence of synovitis within the suprapatellar pouch, medial and lateral aspects of the knee. There was a large suprapatellar, medial and infrapatellar plica. The patellar surface was grade III  and trochlear surface was grade III. I then advanced the scope into the intracondylar notch.   The patients

## 2020-06-10 NOTE — PROGRESS NOTES
6/10/20 1600 reviewed discharge instructions with pt and her friend aimee laiuer.  Pt signed in agreement. jeanie mccoy
hospital.  Luis Garza not bring valuables (money, credit cards, checkbooks, etc.) Do not wear any makeup (including no eye makeup) or nail polish on your fingers or toes. 11. DO NOT wear any jewelry or piercings on day of surgery. All body piercing jewelry must be removed. 12. Shower the night before surgery with __x_Antibacterial soap /PIPER WIPES________    13. TOTAL JOINT REPLACEMENT/HYSTERECTOMY PATIENTS ONLY---Remember to bring Blood Bank bracelet to the hospital on the day of surgery. 14. If you have a Living Will and Durable Power of  for Healthcare, please bring in a copy. 15. If appropriate bring crutches, inspirex, WALKER, CANE etc... 12. Notify your Surgeon if you develop any illness between now and surgery time, cough, cold, fever, sore throat, nausea, vomiting, etc.  Please notify your surgeon if you experience dizziness, shortness of breath or blurred vision between now & the time of your surgery. 17. If you have ___dentures, they will be removed before going to the OR; we will provide you a container. If you wear ___contact lenses or ___glasses, they will be removed; please bring a case for them. 18. To provide excellent care visitors will be limited to 2 in the room at any given time. 19. Please bring picture ID and insurance card. 20. Sleep apnea patients need to bring CPAP AND SETTINGS to hospital on day of surgery. 21. During flu season no children under the age of 15 are permitted in the hospital for the safety of all patients. 22. Other                Please call AMBULATORY CARE if you have any further questions.    [address] [facility]     [address]

## 2020-06-10 NOTE — H&P
#1 performed by Villa Medellin MD at [address] [place] [address] Right 10/7/2019     RIGHT TRANSFORAMINAL LUMBAR EPIDURAL AT L5 AND S1 #3 (CPT 40389) performed by Villa Medellin MD at [facility]         lumbar fusion    BLADDER SURGERY         multiple   d/t  incontinence    BREAST SURGERY         bx neg lt    COLONOSCOPY        CYST REMOVAL   2006     vulvular sebaceous cyst excision    DIAGNOSTIC CARDIAC CATH LAB PROCEDURE   1996/1997     showed \"mitral valve prolapse\", records not available     ECHO COMPL W DOP COLOR FLOW   10/10/2012          ENDOSCOPY, COLON, DIAGNOSTIC        EPIDURAL STEROID INJECTION Right 6/18/2019     RIGHT TRANSFORAMINAL L4 AND L5 EPIDURAL STEROID INJECTION #2 performed by Villa Medellin MD at [facility] Right 03/22/2019    FINGER TRIGGER RELEASE Right 3/22/2019     RIGHT HAND RING FINGER TRIGGER FINGER RELEASE performed by Tracie Dobbins DO at [address] [place] [address]    HIP SURGERY Right        screws and rods     HYSTERECTOMY        KNEE ARTHROSCOPY   10-26-12     left    NERVE BLOCK Right 05/20/2019    NERVE BLOCK Right 10/07/2019     right transforaminal epidural at L5 and S1 #3    OTHER SURGICAL HISTORY         supra pubic tubing for 1 1/2 yrs then removed    OTHER SURGICAL HISTORY Bilateral JULY 11, 2013     BILAT MAXILLARY ANTROSTOMY,BILATERAL  INFERIOR TURBINATE CAUTERY AND LATERALIZATION,ENDOSCOPIC EXCISION BILATERAL MIDDLE TURBINATE ELVIS BULLOSA     OTHER SURGICAL HISTORY Left 7-22-15     left knee arthroscopy partial lateral menisectomy chrondroplasty partial synovectomy     SC ARTHDSIS POST/POSTERLATRL/POSTINTRBDYADL SPC/SEG N/A 7/16/2018     L3-4M L4-5, L5-S1 POSTERIOR LUMBAR INTERBODY FUSION --OARM, AUDIOLOGY, CAGES, PLATES, SCREWS, CRISS, FLORENTIN TABLE, CELL SAVER, PLATELET GEL, MEDTRONIC performed by Nader Lucas MD at [facility]   4    docusate sodium (DOCQLACE) 100 MG capsule, TAKE 1 CAPSULE BY MOUTH DAILY. (Patient taking differently: Take 100 mg by mouth daily TAKE 1 CAPSULE BY MOUTH DAILY.), Disp: 30 capsule, Rfl: 3           Allergies   Allergen Reactions    Penicillins Anaphylaxis    Demerol Other (See Comments)       hypotension    Depakote [Divalproex Sodium]         ?    Haldol [Haloperidol Lactate] Other (See Comments)       shakes    Sulfa Antibiotics Nausea And Vomiting      Social History               Socioeconomic History    Marital status:        Spouse name: Not on file    Number of children: Not on file    Years of education: Not on file    Highest education level: Not on file   Occupational History    Not on file   Social Needs    Financial resource strain: Not on file    Food insecurity       Worry: Not on file       Inability: Not on file    Transportation needs       Medical: Not on file       Non-medical: Not on file   Tobacco Use    Smoking status: Former Smoker       Years: 25.00       Types: Cigarettes       Last attempt to quit: 2002       Years since quittin.4    Smokeless tobacco: Never Used   Substance and Sexual Activity    Alcohol use: No       Comment: quit 2002. 4 cups coffee per day, 2 glasses tea daily    Drug use:  No       Comment: quit in  all the other drugs except marijuana that was     Sexual activity: Never   Lifestyle    Physical activity       Days per week: Not on file       Minutes per session: Not on file    Stress: Not on file   Relationships    Social connections       Talks on phone: Not on file       Gets together: Not on file       Attends Zoroastrianism service: Not on file       Active member of club or organization: Not on file       Attends meetings of clubs or organizations: Not on file       Relationship status: Not on file    Intimate partner violence       Fear of current or ex partner: Not on file       Emotionally abused: Not on file       kg/m²        Skin:  Upon inspection: the skin appears warm, dry and intact. There is not a previous scar over the affected area. There is not any cellulitis, lymphedema or cutaneous lesions noted in the lower extremities. Upon palpation there is no induration noted.       Neurologic:  Gait: normal;  Motor exam of the lower extremities show ; quadriceps, hamstrings, foot dorsi and plantar flexors intact R.  5/5 and L. 5/5. Deep tendon reflexes are 2/4 at the knees and 2/4 at the ankles with strong extensor hallicus longus motor strength bilaterally. Sensory to both feet is intact to all sensory roots.     Cardiovascular: The vascular exam is normal and is well perfused to distal extremities. Distal pulses DP/PT: R. 2+; L. 2+. There is cap refill noted less than two seconds in all digits. There is not edema of the bilateral lower extremities. There is not varicosities noted in the distal extremities.       Lymph:  Upon palpation,  there is no lymphadenopathy noted in bilateral lower extremities.       Musculoskeletal:  Gait: antalgic and wheeled walker; examination of the nails and digits reveal no cyanosis or clubbing     Lumbar exam:  On visual inspection, there is no deformity of the spine. full range of motion, no tenderness, palpable spasm or pain on motion. Special tests: Straight Leg Raise negative, Favian testnegative.     Hip exam:  Upon inspection, there is no deformity noted. Upon palpation there is not tenderness. ROM: is   full and semetrical.   Strength: Hip Flexors 5/5; Hip Abductors 5/5; Hip Adduction 5/5.      Knee exam:  Right knee exam shows;  range of motion of R. Knee is 0 to 105, and L. Knee is 0 to 115. She does have  pain on motion, effusion is mild, there is tenderness over the  medial, anterior region, there are not any masses, there is not ligamentous instability, there is  deformity noted.  Knee exam: right positive for moderate crepitations, some mild tenderness laxity is not noted with stress.       R. Knee:  Lachman's negative, Anterior Drawer negative, Posterior Drawer negative  Kendrick's negative, Thallasy  negative,   PF grind test negative, Apprehension test negative, Patellar J sign  negative  L. Knee:  Lachman's negative, Anterior Drawer negative, Posterior Drawer negative  Kendrick's negative, Thallasy  negative,   PF grind test negative, Apprehension test negative,  Patellar J sign  negative     Xrays:   Mild tricompartmental degenerative changes are noted,   greatest in the medial compartment. There is a small joint effusion. There is normal alignment. The patella is well located.  No fractures   are identified.            MRI:   Not performed. Radiographic findings reviewed with patient     Impression:       Encounter Diagnosis   Name Primary?  Primary osteoarthritis of right knee Yes         Plan:   Natural history and expected course discussed. Questions answered. Educational materials distributed. Rest, ice, compression, and elevation (RICE) therapy. I I had a lengthy discussion with the patient regarding their diagnosis. I explained treatment options including surgical vs non surgical treatment. I reviewed in detail the risks and benefits and outlined the procedure in detail with expected outcomes and possible complications. I also discussed non surgical treatment such as injections (CSI and visco supplementation), physical therapy, topical creams and NSAID's. They have elected for surgical management at this time. The risks and benefits of a knee arthroscopy were discussed with the patient. The risks are including but not limited to: infection, injuries to blood vessels and nerves, non relief of symptoms, arthrofibrosis of knee, need for further operative intervention, blood loss, PE/DVT, MI and death.   The risks are understood by the patient and they wish to proceed with a Right knee arthroscopy, medial menisectomy and debridement on 6/10/2020  The patient was counseled at length about the risks of manolo Covid-19 during their perioperative period and any recovery window from their procedure.  The patient was made aware that manolo Covid-19  may worsen their prognosis for recovering from their procedure  and lend to a higher morbidity and/or mortality risk.  All material risks, benefits, and reasonable alternatives including postponing the procedure were discussed.  The patient does wish to proceed with the procedure at this time

## 2020-06-10 NOTE — ANESTHESIA PRE PROCEDURE
Department of Anesthesiology  Preprocedure Note       Name:  Claudio Monsalve   Age:  60 y.o.  :  1955                                          MRN:  [id number]         Date:  6/10/2020      Surgeon: Tiffany Jacinto): Hart Gowers, DO    Procedure: RIGHT KNEE ARTHROSCOPY WITH MEDIAL MENISCECTOMY  ( DO NOT CHANGE TIME) (Right )    Medications prior to admission:   Prior to Admission medications    Medication Sig Start Date End Date Taking? Authorizing Provider   Coenzyme Q10 (CO Q 10 PO) Take by mouth daily   Yes Historical Provider, MD   clonazePAM (KLONOPIN) 0.5 MG tablet Take 0.5 mg by mouth 2 times daily.    Yes Historical Provider, MD   omeprazole (PRILOSEC) 40 MG delayed release capsule Take 40 mg by mouth daily   Yes Historical Provider, MD   Desvenlafaxine Succinate (PRISTIQ PO) Take 100 mg by mouth daily   Yes Historical Provider, MD   Albuterol (VENTOLIN IN) Inhale 2 puffs into the lungs as needed   Yes Historical Provider, MD   acetaminophen (TYLENOL) 500 MG tablet Take 1,000 mg by mouth every 8 hours as needed for Pain   Yes Historical Provider, MD   famotidine (PEPCID) 40 MG tablet Take 40 mg by mouth Daily with supper    Yes Historical Provider, MD   Multiple Vitamins-Minerals (HAIR SKIN AND NAILS FORMULA) TABS Take 1 capsule by mouth every morning   Yes Historical Provider, MD   nitroGLYCERIN (NITROSTAT) 0.4 MG SL tablet Place 1 tablet under the tongue every 5 minutes as needed for Chest pain 19  Yes Alyson Holly MD   Melatonin-Pyridoxine 5-1 MG TABS Take 2 tablets by mouth nightly    Yes Historical Provider, MD   sodium chloride (DEEP SEA NASAL SPRAY) 0.65 % nasal spray by Nasal route    Yes Historical Provider, MD   aspirin 81 MG chewable tablet Take 1 tablet by mouth daily  Patient taking differently: Take 81 mg by mouth daily Last dose 6/3/2020 7/5/19  Yes Henry Johnson DO   vitamin D (ERGOCALCIFEROL) 53380 units CAPS capsule Take 50,000 Units by mouth once a week 1.25 mg 1 cap every friday   Yes Historical Provider, MD   loperamide (IMODIUM) 2 MG capsule Take 2 mg by mouth 4 times daily as needed for Diarrhea   Yes Historical Provider, MD   calcium carbonate (TUMS) 500 MG chewable tablet Take 2 tablets by mouth 4 times daily as needed for Heartburn   Yes Historical Provider, MD   fluPHENAZine HCl (PROLIXIN) 10 MG tablet Take 20 mg by mouth 2 times daily    Yes Historical Provider, MD   oxybutynin (DITROPAN) 5 MG tablet Take 5 mg by mouth 3 times daily   Yes Historical Provider, MD   polyvinyl alcohol (ARTIFICIAL TEARS) 1.4 % ophthalmic solution Place 1 drop into both eyes 4 times daily    Yes Historical Provider, MD   gabapentin (NEURONTIN) 100 MG capsule Take 100 mg by mouth 3 times daily    Yes Historical Provider, MD   benztropine (COGENTIN) 0.5 MG tablet Take 0.5 mg by mouth 2 times daily    Yes Historical Provider, MD   levothyroxine (SYNTHROID) 25 MCG tablet TAKE 1 TABLET BY MOUTH DAILY. 7/30/15  Yes Cecily Lowery DO   topiramate (TOPAMAX) 100 MG tablet TAKE 1 TABLET BY MOUTH 2 TIMES DAILY. Patient taking differently: BID 6/23/15  Yes Delano Garay DO   docusate sodium (DOCQLACE) 100 MG capsule TAKE 1 CAPSULE BY MOUTH DAILY. Patient taking differently: Take 100 mg by mouth daily TAKE 1 CAPSULE BY MOUTH DAILY.  5/22/15  Yes Pernell Crum DO   metFORMIN (GLUCOPHAGE) 500 MG tablet Take 500 mg by mouth daily (with breakfast)    Historical Provider, MD       Current medications:    Current Facility-Administered Medications   Medication Dose Route Frequency Provider Last Rate Last Dose    clindamycin (CLEOCIN) 900 mg in dextrose 5 % 50 mL IVPB  900 mg Intravenous Once Crystal Deleon DO        0.9 % sodium chloride infusion   Intravenous Continuous Manuel Santizo MD 10 mL/hr at 06/10/20 1033      fentaNYL (SUBLIMAZE) injection 25 mcg  25 mcg Intravenous Q5 Min PRN Manuel Santizo MD        morphine (PF) injection 2 mg  2 mg Intravenous Q5 Min PRN MD Kale Callahan

## 2020-06-18 ENCOUNTER — TELEPHONE (OUTPATIENT)
Dept: ORTHOPEDIC SURGERY | Age: 65
End: 2020-06-18

## 2020-06-18 NOTE — TELEPHONE ENCOUNTER
Patient called asking if she can take a shower. I advised that yes she can take a shower just do not soak or submerge her knee. Pat dry her knee when she's done. Patient understand and is in agreement.      Electronically signed: Bobby Marin AT, ATC 6/18/2020 at 3:28pm.

## 2020-06-23 ENCOUNTER — TELEPHONE (OUTPATIENT)
Dept: ORTHOPEDIC SURGERY | Age: 65
End: 2020-06-23

## 2020-06-23 NOTE — TELEPHONE ENCOUNTER
Nikki Reynoso from [facility], has an order from [facility] and wants to know if Dr Luis Horan will follow pt for her PT. She is also requesting a copy of the operative report be faxed to [phone].

## 2020-06-24 ENCOUNTER — OFFICE VISIT (OUTPATIENT)
Dept: ORTHOPEDIC SURGERY | Age: 65
End: 2020-06-24

## 2020-06-24 VITALS — WEIGHT: 214.29 LBS | HEIGHT: 66 IN | BODY MASS INDEX: 34.44 KG/M2 | TEMPERATURE: 98 F

## 2020-06-24 PROCEDURE — 99024 POSTOP FOLLOW-UP VISIT: CPT | Performed by: NURSE PRACTITIONER

## 2020-06-24 NOTE — PATIENT INSTRUCTIONS
Patient Education        Knee Arthritis: Exercises  Introduction  Here are some examples of exercises for you to try. The exercises may be suggested for a condition or for rehabilitation. Start each exercise slowly. Ease off the exercises if you start to have pain. You will be told when to start these exercises and which ones will work best for you. How to do the exercises  Knee flexion with heel slide   1. Lie on your back with your knees bent. 2. Slide your heel back by bending your affected knee as far as you can. Then hook your other foot around your ankle to help pull your heel even farther back. 3. Hold for about 6 seconds, then rest for up to 10 seconds. 4. Repeat 8 to 12 times. 5. Switch legs and repeat steps 1 through 4, even if only one knee is sore. Quad sets   1. Sit with your affected leg straight and supported on the floor or a firm bed. Place a small, rolled-up towel under your knee. Your other leg should be bent, with that foot flat on the floor. 2. Tighten the thigh muscles of your affected leg by pressing the back of your knee down into the towel. 3. Hold for about 6 seconds, then rest for up to 10 seconds. 4. Repeat 8 to 12 times. 5. Switch legs and repeat steps 1 through 4, even if only one knee is sore. Straight-leg raises to the front   1. Lie on your back with your good knee bent so that your foot rests flat on the floor. Your affected leg should be straight. Make sure that your low back has a normal curve. You should be able to slip your hand in between the floor and the small of your back, with your palm touching the floor and your back touching the back of your hand. 2. Tighten the thigh muscles in your affected leg by pressing the back of your knee flat down to the floor. Hold your knee straight. 3. Keeping the thigh muscles tight and your leg straight, lift your affected leg up so that your heel is about 12 inches off the floor.  Hold for about 6 seconds, then lower

## 2020-06-24 NOTE — PROGRESS NOTES
Subjective:        Jesus Leonard is here for follow-up after right knee arthroscopy. Findings at surgery: djd. Pain is controlled without any medications. . She denies fever, wound drainage, increasing redness, pus, increasing pain, increasing swelling. Post op problems reported: none. She is ambulating with walker. Objective:           General :    alert, appears stated age and cooperative   Gait:  Normal.   Sutures:   Sutures in place and will be removed today. Incision:  healing well, no significant drainage, no dehiscence, no significant erythema   Tenderness:  mild   Flexion ROM:  diminished range of motion   Extension ROM:  full range of motion   Effusion:  mild   DVT Evaluation:  No evidence of DVT seen on physical exam.           Assessment:     Encounter Diagnosis   Name Primary?  Primary osteoarthritis of right knee Yes         Plan:      Surgical pictures from the surgery were reveiwed with the patient  HEP  Sutures removed today. Follow up: 4 weeks.

## 2020-07-28 ENCOUNTER — OFFICE VISIT (OUTPATIENT)
Dept: ORTHOPEDIC SURGERY | Age: 65
End: 2020-07-28

## 2020-07-28 VITALS — HEIGHT: 66 IN | BODY MASS INDEX: 35.36 KG/M2 | WEIGHT: 220 LBS

## 2020-07-28 PROCEDURE — 99024 POSTOP FOLLOW-UP VISIT: CPT | Performed by: ORTHOPAEDIC SURGERY

## 2020-07-28 NOTE — PROGRESS NOTES
Chief Complaint   Patient presents with    Post-Op Check     post-op from right knee pain, DOS 6/10/2020, patient complains of leg pain when she walks. Shelia Vrea returns today for follow-up of her right knee pain. she reports this is better than when I saw her last.  The patient's pain level is a 3-6/10. The previous treatment was successful.     Past Medical History:   Diagnosis Date    Anxiety     Arthritis     Childress's esophagus     Bipolar 2 disorder ([facility]     Chronic back pain     CKD     Constipation     Continuous urine leakage     COPD (chronic obstructive pulmonary disease) ([facility])     no oxygen use, BIpap at night     Depression     Dysphagia     GERD (gastroesophageal reflux disease)     History of cardiovascular stress test 10-    LEXISCAN    History of cardiovascular stress test 06/2014    lexiscan stress    History of cardiovascular stress test 05/22/2018    Lexiscan stress test    Hx of cardiovascular stress test 07/04/2019    Lexiscan stress test    Hyperlipidemia     MDRO (multiple drug resistant organisms) resistance     mrsa 3-4 yrs ago with back surgery (2015)    Menopausal symptoms     atrophic vaginitis    Mitral valve prolapse     Nausea & vomiting     with anesthesia    Neurogenic bladder     Obstructive sleep apnea     uses cpap    EMILEE treated with BiPAP     no cpap    Overactive bladder     PONV (postoperative nausea and vomiting)     Pulmonary embolism ([facility])     few yrs ago has vena cava filter    PVD (peripheral vascular disease) ([facility])     Schizoaffective disorder ([facility])     Seizure disorder ([facility])     stares- Last one 1-2019     Thyroid disease     x30 years    Type 2 diabetes mellitus, without long-term current use of insulin ([facility] [address] 7/16/2018    diet controlled     Past Surgical History:   Procedure Laterality Date    ANESTHESIA NERVE BLOCK Right 5/20/2019    RIGHT TRANSFORAMINAL EPIDURAL L4-5 #1 performed by Carmelita Tomas MD at [facility]    ANESTHESIA NERVE BLOCK Right 10/7/2019    RIGHT TRANSFORAMINAL LUMBAR EPIDURAL AT L5 AND S1 #3 (CPT 01146) performed by Lawrence Patterson MD at [place] [address]      lumbar fusion   [address] [place] [address] [address]      multiple   d/t  incontinence    BREAST SURGERY      bx neg lt    COLONOSCOPY      CYST REMOVAL  2006    vulvular sebaceous cyst excision    DIAGNOSTIC CARDIAC CATH LAB PROCEDURE  1996/1997    showed \"mitral valve prolapse\", records not available     ECHO COMPL W DOP COLOR FLOW  10/10/2012         ENDOSCOPY, COLON, DIAGNOSTIC      EPIDURAL STEROID INJECTION Right 6/18/2019    RIGHT TRANSFORAMINAL L4 AND L5 EPIDURAL STEROID INJECTION #2 performed by Lawrence [facility] MD at [facility] Right 03/22/2019    FINGER TRIGGER RELEASE Right 3/22/2019    RIGHT HAND RING FINGER TRIGGER FINGER RELEASE performed by Hue Boggs DO at [address] [facility] TOE SURGERY      lt    HIP SURGERY Right      screws and rods     HYSTERECTOMY      JOINT REPLACEMENT      right hip    KNEE ARTHROSCOPY  10-26-12    left    KNEE ARTHROSCOPY Right 6/10/2020    RIGHT KNEE ARTHROSCOPY WITH MEDIAL MENISCECTOMY, DEBRIDEMENT, SYNOVECTOMY  performed by Hue Boggs DO at [address] Right 05/20/2019    NERVE BLOCK Right 10/07/2019    right transforaminal epidural at L5 and S1 #3    OTHER SURGICAL HISTORY      supra pubic tubing for 1 1/2 yrs then removed    OTHER SURGICAL HISTORY Bilateral JULY 11, 2013    BILAT MAXILLARY ANTROSTOMY,BILATERAL  INFERIOR TURBINATE CAUTERY AND LATERALIZATION,ENDOSCOPIC EXCISION BILATERAL MIDDLE TURBINATE ELVIS BULLOSA     OTHER SURGICAL HISTORY Left 7-22-15    left knee arthroscopy partial lateral menisectomy chrondroplasty partial synovectomy     AK ARTHDSIS POST/POSTERLATRL/POSTINTRBDYADL SPC/SEG N/A 7/16/2018    L3-4M L4-5, L5-S1 POSTERIOR LUMBAR INTERBODY FUSION --OARM, AUDIOLOGY, CAGES, PLATES, SCREWS, FLORENTIN KAHN TABLE, CELL SAVER, PLATELET GEL, MEDTRONIC performed by Mary Dudley MD at [address]  [facility] ENDOSCOPY      VENA CAVA FILTER PLACEMENT         Current Outpatient Medications:     Coenzyme Q10 (CO Q 10 PO), Take by mouth daily, Disp: , Rfl:     clonazePAM (KLONOPIN) 0.5 MG tablet, Take 0.5 mg by mouth 2 times daily. , Disp: , Rfl:     omeprazole (PRILOSEC) 40 MG delayed release capsule, Take 40 mg by mouth daily, Disp: , Rfl:     Desvenlafaxine Succinate (PRISTIQ PO), Take 100 mg by mouth daily, Disp: , Rfl:     Albuterol (VENTOLIN IN), Inhale 2 puffs into the lungs as needed, Disp: , Rfl:     acetaminophen (TYLENOL) 500 MG tablet, Take 1,000 mg by mouth every 8 hours as needed for Pain, Disp: , Rfl:     famotidine (PEPCID) 40 MG tablet, Take 40 mg by mouth Daily with supper , Disp: , Rfl:     Multiple Vitamins-Minerals (HAIR SKIN AND NAILS FORMULA) TABS, Take 1 capsule by mouth every morning, Disp: , Rfl:     nitroGLYCERIN (NITROSTAT) 0.4 MG SL tablet, Place 1 tablet under the tongue every 5 minutes as needed for Chest pain, Disp: 25 tablet, Rfl: 5    Melatonin-Pyridoxine 5-1 MG TABS, Take 2 tablets by mouth nightly , Disp: , Rfl:     sodium chloride (DEEP SEA NASAL SPRAY) 0.65 % nasal spray, by Nasal route , Disp: , Rfl:     aspirin 81 MG chewable tablet, Take 1 tablet by mouth daily (Patient taking differently: Take 81 mg by mouth daily Last dose 6/3/2020), Disp: 30 tablet, Rfl: 3    vitamin D (ERGOCALCIFEROL) 52281 units CAPS capsule, Take 50,000 Units by mouth once a week 1.25 mg 1 cap every friday, Disp: , Rfl:     loperamide (IMODIUM) 2 MG capsule, Take 2 mg by mouth 4 times daily as needed for Diarrhea, Disp: , Rfl:     calcium carbonate (TUMS) 500 MG chewable tablet, Take 2 tablets by mouth 4 times daily as needed for Heartburn, Disp: , Rfl:     fluPHENAZine HCl (PROLIXIN) 10 MG tablet, Take 20 mg by mouth 2 times daily , Disp: , Rfl:    metFORMIN (GLUCOPHAGE) 500 MG tablet, Take 500 mg by mouth daily (with breakfast), Disp: , Rfl:     oxybutynin (DITROPAN) 5 MG tablet, Take 5 mg by mouth 3 times daily, Disp: , Rfl:     polyvinyl alcohol (ARTIFICIAL TEARS) 1.4 % ophthalmic solution, Place 1 drop into both eyes 4 times daily , Disp: , Rfl:     gabapentin (NEURONTIN) 100 MG capsule, Take 100 mg by mouth 3 times daily , Disp: , Rfl:     benztropine (COGENTIN) 0.5 MG tablet, Take 0.5 mg by mouth 2 times daily , Disp: , Rfl:     levothyroxine (SYNTHROID) 25 MCG tablet, TAKE 1 TABLET BY MOUTH DAILY. , Disp: 30 tablet, Rfl: 4    topiramate (TOPAMAX) 100 MG tablet, TAKE 1 TABLET BY MOUTH 2 TIMES DAILY. (Patient taking differently: BID), Disp: 60 tablet, Rfl: 4    docusate sodium (DOCQLACE) 100 MG capsule, TAKE 1 CAPSULE BY MOUTH DAILY. (Patient taking differently: Take 100 mg by mouth daily TAKE 1 CAPSULE BY MOUTH DAILY.), Disp: 30 capsule, Rfl: 3  Allergies   Allergen Reactions    Penicillins Anaphylaxis    Demerol Other (See Comments)     hypotension    Depakote [Divalproex Sodium]      ?     Haldol [Haloperidol Lactate] Other (See Comments)     shakes    Sulfa Antibiotics Nausea And Vomiting     Social History     Socioeconomic History    Marital status:      Spouse name: Not on file    Number of children: Not on file    Years of education: Not on file    Highest education level: Not on file   Occupational History    Not on file   Social Needs    Financial resource strain: Not on file    Food insecurity     Worry: Not on file     Inability: Not on file    Transportation needs     Medical: Not on file     Non-medical: Not on file   Tobacco Use    Smoking status: Former Smoker     Years: 25.00     Types: Cigarettes     Last attempt to quit: 2002     Years since quittin.5    Smokeless tobacco: Never Used   Substance and Sexual Activity    Alcohol use: No     Comment: quit . 4 cups coffee per day, 2 glasses tea daily  Drug use: No     Comment: quit in 1981 all the other drugs except marijuana that was 1986    Sexual activity: Never   Lifestyle    Physical activity     Days per week: Not on file     Minutes per session: Not on file    Stress: Not on file   Relationships    Social connections     Talks on phone: Not on file     Gets together: Not on file     Attends Taoist service: Not on file     Active member of club or organization: Not on file     Attends meetings of clubs or organizations: Not on file     Relationship status: Not on file    Intimate partner violence     Fear of current or ex partner: Not on file     Emotionally abused: Not on file     Physically abused: Not on file     Forced sexual activity: Not on file   Other Topics Concern    Not on file   Social History Narrative    Not on file     Family History   Problem Relation Age of Onset    Colon Cancer Father     Cancer Father 77        colon cancer    Breast Cancer Mother     Cancer Mother         lung    Other Mother         mother passed away from low platelettes    Heart Attack Mother     Other Brother         hemiplegic from sepsis of spinal cord    Other Brother         MVA    Breast Cancer Maternal Aunt     Breast Cancer Maternal Aunt        Review of Systems:     Skin: (-) rash,(-) psoriasis,(-) eczema, (-)skin cancer. Musculoskeletal: (-) fractures,  (-) dislocations,(-) collagen vascular disease, (-) fibromyalgia, (-) multiple sclerosis, (-) muscular dystrophy, (-) RSD,(-) joint pain (-)swelling, (-) joint pain,swelling. Neurologic: (-) epilepsy, (-)seizures,(-) brain tumor,(-) TIA, (-)stroke, (-)headaches, (-)Parkinson disease,(-) memory loss, (-) LOC. Cardiovascular: (-) Chest pain, (-) swelling in legs/feet, (-) SOB, (-) cramping in legs/feet with walking. Constitutional:  The patient is alert and oriented x 3, appears to be stated age and in no distress. Ht 5' 6\" (1.676 m)   Wt 220 lb (99.8 kg)   BMI 35.51 kg/m² Skin:  Upon inspection: the skin appears warm, dry and intact. There is not a previous scar over the affected area. There is not any cellulitis, lymphedema or cutaneous lesions noted in the lower extremities. Upon palpation there is no induration noted. Neurologic:  Gait: normal;  Motor exam of the lower extremities show ; quadriceps, hamstrings, foot dorsi and plantar flexors intact R.  5/5 and L. 5/5. Deep tendon reflexes are 2/4 at the knees and 2/4 at the ankles with strong extensor hallicus longus motor strength bilaterally. Sensory to both feet is intact to all sensory roots. Cardiovascular: The vascular exam is normal and is well perfused to distal extremities. Distal pulses DP/PT: R. 2+; L. 2+. There is cap refill noted less than two seconds in all digits. There is not edema of the bilateral lower extremities. There is not varicosities noted in the distal extremities. Lymph:  Upon palpation,  there is no lymphadenopathy noted in bilateral lower extremities. Musculoskeletal:  Gait: antalgic; examination of the nails and digits reveal no cyanosis or clubbing    Lumbar exam:  On visual inspection, there is no deformity of the spine. full range of motion, no tenderness, palpable spasm or pain on motion. Special tests: Straight Leg Raise negative, Favian testnegative. Hip exam:  Upon inspection, there is no deformity noted. Upon palpation there is not tenderness. ROM: is   full and semetrical.   Strength: Hip Flexors 5/5; Hip Abductors 5/5; Hip Adduction 5/5. Knee exam:  Right knee exam shows;  range of motion of R. Knee is 0 to 110, and L. Knee is 0 to 120. She does have  pain on motion, effusion is mild, there is tenderness over the  medial region, there are not any masses, there is not ligamentous instability, there is  deformity noted. Knee exam: bilateral positive for moderate crepitations, some mild tenderness laxity is not noted with  stress.       R. Knee:  [employer]

## 2020-08-20 ENCOUNTER — APPOINTMENT (OUTPATIENT)
Dept: CT IMAGING | Age: 65
End: 2020-08-20
Payer: MEDICAID

## 2020-08-20 ENCOUNTER — HOSPITAL ENCOUNTER (EMERGENCY)
Age: 65
Discharge: HOME | End: 2020-08-20
Attending: EMERGENCY MEDICINE
Payer: MEDICAID

## 2020-08-20 VITALS
RESPIRATION RATE: 16 BRPM | HEART RATE: 62 BPM | TEMPERATURE: 98.2 F | BODY MASS INDEX: 34.55 KG/M2 | SYSTOLIC BLOOD PRESSURE: 133 MMHG | OXYGEN SATURATION: 98 % | HEIGHT: 66 IN | DIASTOLIC BLOOD PRESSURE: 97 MMHG | WEIGHT: 215 LBS

## 2020-08-20 PROCEDURE — 96375 TX/PRO/DX INJ NEW DRUG ADDON: CPT

## 2020-08-20 PROCEDURE — 99284 EMERGENCY DEPT VISIT MOD MDM: CPT

## 2020-08-20 PROCEDURE — 70450 CT HEAD/BRAIN W/O DYE: CPT

## 2020-08-20 PROCEDURE — 2580000003 HC RX 258: Performed by: EMERGENCY MEDICINE

## 2020-08-20 PROCEDURE — 6370000000 HC RX 637 (ALT 250 FOR IP): Performed by: EMERGENCY MEDICINE

## 2020-08-20 PROCEDURE — 96374 THER/PROPH/DIAG INJ IV PUSH: CPT

## 2020-08-20 PROCEDURE — 99283 EMERGENCY DEPT VISIT LOW MDM: CPT

## 2020-08-20 PROCEDURE — 6360000002 HC RX W HCPCS: Performed by: EMERGENCY MEDICINE

## 2020-08-20 RX ORDER — METOCLOPRAMIDE HYDROCHLORIDE 5 MG/ML
10 INJECTION INTRAMUSCULAR; INTRAVENOUS ONCE
Status: COMPLETED | OUTPATIENT
Start: 2020-08-20 | End: 2020-08-20

## 2020-08-20 RX ORDER — DIPHENHYDRAMINE HYDROCHLORIDE 50 MG/ML
25 INJECTION INTRAMUSCULAR; INTRAVENOUS ONCE
Status: COMPLETED | OUTPATIENT
Start: 2020-08-20 | End: 2020-08-20

## 2020-08-20 RX ORDER — 0.9 % SODIUM CHLORIDE 0.9 %
1000 INTRAVENOUS SOLUTION INTRAVENOUS ONCE
Status: COMPLETED | OUTPATIENT
Start: 2020-08-20 | End: 2020-08-20

## 2020-08-20 RX ORDER — KETOROLAC TROMETHAMINE 15 MG/ML
15 INJECTION, SOLUTION INTRAMUSCULAR; INTRAVENOUS ONCE
Status: COMPLETED | OUTPATIENT
Start: 2020-08-20 | End: 2020-08-20

## 2020-08-20 RX ORDER — HYDROCODONE BITARTRATE AND ACETAMINOPHEN 5; 325 MG/1; MG/1
1 TABLET ORAL ONCE
Status: COMPLETED | OUTPATIENT
Start: 2020-08-20 | End: 2020-08-20

## 2020-08-20 RX ADMIN — METOCLOPRAMIDE HYDROCHLORIDE 10 MG: 5 INJECTION INTRAMUSCULAR; INTRAVENOUS at 03:55

## 2020-08-20 RX ADMIN — HYDROCODONE BITARTRATE AND ACETAMINOPHEN 1 TABLET: 5; 325 TABLET ORAL at 05:54

## 2020-08-20 RX ADMIN — KETOROLAC TROMETHAMINE 15 MG: 15 INJECTION, SOLUTION INTRAMUSCULAR; INTRAVENOUS at 04:59

## 2020-08-20 RX ADMIN — SODIUM CHLORIDE 1000 ML: 9 INJECTION, SOLUTION INTRAVENOUS at 03:53

## 2020-08-20 RX ADMIN — DIPHENHYDRAMINE HYDROCHLORIDE 25 MG: 50 INJECTION, SOLUTION INTRAMUSCULAR; INTRAVENOUS at 03:58

## 2020-08-20 ASSESSMENT — ENCOUNTER SYMPTOMS
EYE PAIN: 0
SINUS PRESSURE: 0
BACK PAIN: 0
VOMITING: 0
NAUSEA: 0
COUGH: 0
DIARRHEA: 0
WHEEZING: 0
EYE REDNESS: 0
ABDOMINAL DISTENTION: 0
SORE THROAT: 0
SHORTNESS OF BREATH: 0
EYE DISCHARGE: 0

## 2020-08-20 ASSESSMENT — PAIN SCALES - GENERAL
PAINLEVEL_OUTOF10: 3
PAINLEVEL_OUTOF10: 5
PAINLEVEL_OUTOF10: 6

## 2020-08-20 ASSESSMENT — PAIN DESCRIPTION - ONSET: ONSET: ON-GOING

## 2020-08-20 ASSESSMENT — PAIN DESCRIPTION - DESCRIPTORS: DESCRIPTORS: ACHING

## 2020-08-20 ASSESSMENT — PAIN DESCRIPTION - PAIN TYPE: TYPE: ACUTE PAIN;CHRONIC PAIN

## 2020-08-20 ASSESSMENT — PAIN DESCRIPTION - LOCATION: LOCATION: HEAD

## 2020-08-20 ASSESSMENT — PAIN DESCRIPTION - PROGRESSION: CLINICAL_PROGRESSION: NOT CHANGED

## 2020-08-20 ASSESSMENT — PAIN DESCRIPTION - FREQUENCY: FREQUENCY: CONTINUOUS

## 2020-08-20 NOTE — ED NOTES
Bed: 08  Expected date:   Expected time:   Means of arrival:   Comments:  neetu Velazco RN  08/20/20 [id number]

## 2020-08-20 NOTE — ED PROVIDER NOTES
Patient is a 59 y/o female who presents to the ED via EMS with a headache. Patient states she had onset of the headache yesterday. The headache has been constant and is currently 4/10. It is right sided and towards the back. She denies any fever, trauma or neck stiffness. She states that she has had similar headaches in the past but not this bad. She denies any nausea or photophobia. She denies any slurred speech or blurred vision. Review of Systems   Constitutional: Negative for chills and fever. HENT: Negative for ear pain, sinus pressure and sore throat. Eyes: Negative for pain, discharge and redness. Respiratory: Negative for cough, shortness of breath and wheezing. Cardiovascular: Negative for chest pain. Gastrointestinal: Negative for abdominal distention, diarrhea, nausea and vomiting. Genitourinary: Negative for dysuria and frequency. Musculoskeletal: Negative for arthralgias and back pain. Skin: Negative for rash and wound. Neurological: Positive for headaches. Negative for weakness. Hematological: Negative for adenopathy. All other systems reviewed and are negative. Physical Exam  Vitals signs and nursing note reviewed. Constitutional:       General: She is not in acute distress. HENT:      Head: Normocephalic and atraumatic. Right Ear: External ear normal.      Left Ear: External ear normal.      Nose: Nose normal.      Mouth/Throat:      Mouth: Mucous membranes are moist.   Eyes:      Conjunctiva/sclera: Conjunctivae normal.      Pupils: Pupils are equal, round, and reactive to light. Neck:      Musculoskeletal: Normal range of motion and neck supple. Comments: No meningeal signs. Cardiovascular:      Rate and Rhythm: Normal rate and regular rhythm. Heart sounds: No murmur. Pulmonary:      Effort: Pulmonary effort is normal. No respiratory distress. Breath sounds: Normal breath sounds. No stridor. No wheezing, rhonchi or rales. Abdominal:      General: Bowel sounds are normal. There is no distension. Palpations: Abdomen is soft. Tenderness: There is no abdominal tenderness. There is no guarding. Musculoskeletal: Normal range of motion. General: No swelling. Skin:     General: Skin is warm and dry. Findings: No rash. Neurological:      Mental Status: She is alert and oriented to person, place, and time. Procedures     MDM         Time: 6693. Re-evaluation. Patients symptoms are improving  Repeat physical examination is not changed  Patient's headache is improving.         --------------------------------------------- PAST HISTORY ---------------------------------------------  Past Medical History:  has a past medical history of Anxiety, Arthritis, Childress's esophagus, Bipolar 2 disorder ([facility]), Chronic back pain, CKD, Constipation, Continuous urine leakage, COPD (chronic obstructive pulmonary disease) ([facility]), Depression, Dysphagia, GERD (gastroesophageal reflux disease), History of cardiovascular stress test, History of cardiovascular stress test, History of cardiovascular stress test, Hx of cardiovascular stress test, Hyperlipidemia, MDRO (multiple drug resistant organisms) resistance, Menopausal symptoms, Mitral valve prolapse, Nausea & vomiting, Neurogenic bladder, Obstructive sleep apnea, EMILEE treated with BiPAP, Overactive bladder, PONV (postoperative nausea and vomiting), Pulmonary embolism ([address] PVD (peripheral vascular disease) ([address] Schizoaffective disorder ([address] Seizure disorder ([address] Thyroid disease, and Type 2 diabetes mellitus, without long-term current use of insulin ([address] Past Surgical History:  has a past surgical history that includes Hysterectomy; hip surgery (Right); Bladder surgery; Vena Cava Filter Placement; ECHO Compl W Dop Color Flow (10/10/2012); Appendectomy; Tonsillectomy; Colonoscopy; Endoscopy, colon, diagnostic; Knee arthroscopy (10-26-12);  Hammer toe surgery; Wt 215 lb (97.5 kg)   SpO2 98%   BMI 34.70 kg/m²   Oxygen Saturation Interpretation: Normal      ------------------------------------------ PROGRESS NOTES ------------------------------------------  I have spoken with the patient and discussed todays results, in addition to providing specific details for the plan of care and counseling regarding the diagnosis and prognosis. Their questions are answered at this time and they are agreeable with the plan. I discussed at length with them reasons for immediate return here for re evaluation. They will followup with primary care by calling their office tomorrow. --------------------------------- ADDITIONAL PROVIDER NOTES ---------------------------------  At this time the patient is without objective evidence of an acute process requiring hospitalization or inpatient management. They have remained hemodynamically stable throughout their entire ED visit and are stable for discharge with outpatient follow-up. The plan has been discussed in detail and they are aware of the specific conditions for emergent return, as well as the importance of follow-up. New Prescriptions    No medications on file       Diagnosis:  1. Acute nonintractable headache, unspecified headache type        Disposition:  Patient's disposition: Discharge to nursing home  Patient's condition is stable.                [facility],   08/20/20 0666

## 2020-10-05 ENCOUNTER — OFFICE VISIT (OUTPATIENT)
Dept: ORTHOPEDIC SURGERY | Age: 65
End: 2020-10-05
Payer: MEDICAID

## 2020-10-05 VITALS — HEIGHT: 66 IN | TEMPERATURE: 98 F | BODY MASS INDEX: 34.72 KG/M2 | WEIGHT: 216 LBS

## 2020-10-05 PROCEDURE — 20610 DRAIN/INJ JOINT/BURSA W/O US: CPT | Performed by: ORTHOPAEDIC SURGERY

## 2020-10-05 PROCEDURE — 99214 OFFICE O/P EST MOD 30 MIN: CPT | Performed by: ORTHOPAEDIC SURGERY

## 2020-10-05 RX ORDER — TRIAMCINOLONE ACETONIDE 40 MG/ML
40 INJECTION, SUSPENSION INTRA-ARTICULAR; INTRAMUSCULAR ONCE
Status: COMPLETED | OUTPATIENT
Start: 2020-10-05 | End: 2020-10-05

## 2020-10-05 RX ADMIN — TRIAMCINOLONE ACETONIDE 40 MG: 40 INJECTION, SUSPENSION INTRA-ARTICULAR; INTRAMUSCULAR at 09:48

## 2020-10-05 NOTE — PROGRESS NOTES
Chief Complaint   Patient presents with    Knee Pain     right side knee pain    Shoulder Pain     right side shoulder pain       Jerilyn Noble returns today for follow-up of her right knee and right shoulder pain pain. she reports this is worse than when I saw her last.  The patient's pain level is a 4/10. The previous treatment was successful.     Past Medical History:   Diagnosis Date    Anxiety     Arthritis     Childress's esophagus     Bipolar 2 disorder ([facility])     Chronic back pain     CKD     Constipation     Continuous urine leakage     COPD (chronic obstructive pulmonary disease) ([facility])     no oxygen use, BIpap at night     Depression     Dysphagia     GERD (gastroesophageal reflux disease)     History of cardiovascular stress test 10-    LEXISCAN    History of cardiovascular stress test 06/2014    lexiscan stress    History of cardiovascular stress test 05/22/2018    Lexiscan stress test    Hx of cardiovascular stress test 07/04/2019    Lexiscan stress test    Hyperlipidemia     MDRO (multiple drug resistant organisms) resistance     mrsa 3-4 yrs ago with back surgery (2015)    Menopausal symptoms     atrophic vaginitis    Mitral valve prolapse     Nausea & vomiting     with anesthesia    Neurogenic bladder     Obstructive sleep apnea     uses cpap    EMILEE treated with BiPAP     no cpap    Overactive bladder     PONV (postoperative nausea and vomiting)     Pulmonary embolism ([facility])     few yrs ago has vena cava filter    PVD (peripheral vascular disease) ([facility])     Schizoaffective disorder ([facility])     Seizure disorder ([facility])     stares- Last one 1-2019     Thyroid disease     x30 years    Type 2 diabetes mellitus, without long-term current use of insulin ([address] 7/16/2018    diet controlled     Past Surgical History:   Procedure Laterality Date    ANESTHESIA NERVE BLOCK Right 5/20/2019    RIGHT TRANSFORAMINAL EPIDURAL L4-5 #1 performed by Marco Antonio Rick MD at [address] [place] [address]  ANESTHESIA NERVE BLOCK Right 10/7/2019    RIGHT TRANSFORAMINAL LUMBAR EPIDURAL AT L5 AND S1 #3 (CPT 96364) performed by Nancy Pimentel MD at [place] [address]      lumbar fusion   [facility],  [address]      multiple   d/t  incontinence    BREAST SURGERY      bx neg lt    COLONOSCOPY      CYST REMOVAL  2006    vulvular sebaceous cyst excision    DIAGNOSTIC CARDIAC CATH LAB PROCEDURE  1996/1997    showed \"mitral valve prolapse\", records not available     ECHO COMPL W DOP COLOR FLOW  10/10/2012         ENDOSCOPY, COLON, DIAGNOSTIC      EPIDURAL STEROID INJECTION Right 6/18/2019    RIGHT TRANSFORAMINAL L4 AND L5 EPIDURAL STEROID INJECTION #2 performed by Nancy Pimentel MD at [facility] Right 03/22/2019    FINGER TRIGGER RELEASE Right 3/22/2019    RIGHT HAND RING FINGER TRIGGER FINGER RELEASE performed by Erick Landrum DO at [address] [facility] TOE SURGERY      lt    HIP SURGERY Right      screws and rods     HYSTERECTOMY      JOINT REPLACEMENT      right hip    KNEE ARTHROSCOPY  10-26-12    left    KNEE ARTHROSCOPY Right 6/10/2020    RIGHT KNEE ARTHROSCOPY WITH MEDIAL MENISCECTOMY, DEBRIDEMENT, SYNOVECTOMY  performed by Erick Landrum DO at [address] Right 05/20/2019    NERVE BLOCK Right 10/07/2019    right transforaminal epidural at L5 and S1 #3    OTHER SURGICAL HISTORY      supra pubic tubing for 1 1/2 yrs then removed    OTHER SURGICAL HISTORY Bilateral JULY 11, 2013    BILAT MAXILLARY ANTROSTOMY,BILATERAL  INFERIOR TURBINATE CAUTERY AND LATERALIZATION,ENDOSCOPIC EXCISION BILATERAL MIDDLE TURBINATE ELVIS BULLOSA     OTHER SURGICAL HISTORY Left 7-22-15    left knee arthroscopy partial lateral menisectomy chrondroplasty partial synovectomy     ME ARTHDSIS POST/POSTERLATRL/POSTINTRBDYADL SPC/SEG N/A 7/16/2018    L3-4M L4-5, L5-S1 POSTERIOR LUMBAR INTERBODY FUSION --OARM, AUDIOLOGY, CAGES, PLATES, SCREWS, CRISS, FLORENTIN TABLE, CELL SAVER, PLATELET GEL, MEDTRONIC performed by Pramod Castro MD at [facility]  [facility] ENDOSCOPY      VENA CAVA FILTER PLACEMENT         Current Outpatient Medications:     Coenzyme Q10 (CO Q 10 PO), Take by mouth daily, Disp: , Rfl:     clonazePAM (KLONOPIN) 0.5 MG tablet, Take 0.5 mg by mouth 2 times daily. , Disp: , Rfl:     omeprazole (PRILOSEC) 40 MG delayed release capsule, Take 40 mg by mouth daily, Disp: , Rfl:     Desvenlafaxine Succinate (PRISTIQ PO), Take 100 mg by mouth daily, Disp: , Rfl:     Albuterol (VENTOLIN IN), Inhale 2 puffs into the lungs as needed, Disp: , Rfl:     acetaminophen (TYLENOL) 500 MG tablet, Take 1,000 mg by mouth every 8 hours as needed for Pain, Disp: , Rfl:     famotidine (PEPCID) 40 MG tablet, Take 40 mg by mouth Daily with supper , Disp: , Rfl:     Multiple Vitamins-Minerals (HAIR SKIN AND NAILS FORMULA) TABS, Take 1 capsule by mouth every morning, Disp: , Rfl:     nitroGLYCERIN (NITROSTAT) 0.4 MG SL tablet, Place 1 tablet under the tongue every 5 minutes as needed for Chest pain, Disp: 25 tablet, Rfl: 5    Melatonin-Pyridoxine 5-1 MG TABS, Take 2 tablets by mouth nightly , Disp: , Rfl:     sodium chloride (DEEP SEA NASAL SPRAY) 0.65 % nasal spray, by Nasal route , Disp: , Rfl:     aspirin 81 MG chewable tablet, Take 1 tablet by mouth daily (Patient taking differently: Take 81 mg by mouth daily Last dose 6/3/2020), Disp: 30 tablet, Rfl: 3    vitamin D (ERGOCALCIFEROL) 98191 units CAPS capsule, Take 50,000 Units by mouth once a week 1.25 mg 1 cap every friday, Disp: , Rfl:     loperamide (IMODIUM) 2 MG capsule, Take 2 mg by mouth 4 times daily as needed for Diarrhea, Disp: , Rfl:     calcium carbonate (TUMS) 500 MG chewable tablet, Take 2 tablets by mouth 4 times daily as needed for Heartburn, Disp: , Rfl:     fluPHENAZine HCl (PROLIXIN) 10 MG tablet, Take 20 mg by mouth 2 times daily , Disp: , Rfl:     metFORMIN (GLUCOPHAGE) 500 MG tablet, Take 500 mg by mouth daily (with breakfast), Disp: , Rfl:     oxybutynin (DITROPAN) 5 MG tablet, Take 5 mg by mouth 3 times daily, Disp: , Rfl:     polyvinyl alcohol (ARTIFICIAL TEARS) 1.4 % ophthalmic solution, Place 1 drop into both eyes 4 times daily , Disp: , Rfl:     gabapentin (NEURONTIN) 100 MG capsule, Take 100 mg by mouth 3 times daily , Disp: , Rfl:     benztropine (COGENTIN) 0.5 MG tablet, Take 0.5 mg by mouth 2 times daily , Disp: , Rfl:     levothyroxine (SYNTHROID) 25 MCG tablet, TAKE 1 TABLET BY MOUTH DAILY. , Disp: 30 tablet, Rfl: 4    topiramate (TOPAMAX) 100 MG tablet, TAKE 1 TABLET BY MOUTH 2 TIMES DAILY. (Patient taking differently: BID), Disp: 60 tablet, Rfl: 4    docusate sodium (DOCQLACE) 100 MG capsule, TAKE 1 CAPSULE BY MOUTH DAILY. (Patient taking differently: Take 100 mg by mouth daily TAKE 1 CAPSULE BY MOUTH DAILY.), Disp: 30 capsule, Rfl: 3  Allergies   Allergen Reactions    Penicillins Anaphylaxis    Demerol Other (See Comments)     hypotension    Depakote [Divalproex Sodium]      ?     Haldol [Haloperidol Lactate] Other (See Comments)     shakes    Sulfa Antibiotics Nausea And Vomiting     Social History     Socioeconomic History    Marital status:      Spouse name: Not on file    Number of children: Not on file    Years of education: Not on file    Highest education level: Not on file   Occupational History    Not on file   Social Needs    Financial resource strain: Not on file    Food insecurity     Worry: Not on file     Inability: Not on file    Transportation needs     Medical: Not on file     Non-medical: Not on file   Tobacco Use    Smoking status: Former Smoker     Years: 25.00     Types: Cigarettes     Last attempt to quit: 2002     Years since quittin.7    Smokeless tobacco: Never Used   Substance and Sexual Activity    Alcohol use: No     Comment: quit . 4 cups coffee per day, 2 glasses tea daily    Drug use: No     Comment: quit in 1981 all the other drugs except marijuana that was 1986    Sexual activity: Never   Lifestyle    Physical activity     Days per week: Not on file     Minutes per session: Not on file    Stress: Not on file   Relationships    Social connections     Talks on phone: Not on file     Gets together: Not on file     Attends Christianity service: Not on file     Active member of club or organization: Not on file     Attends meetings of clubs or organizations: Not on file     Relationship status: Not on file    Intimate partner violence     Fear of current or ex partner: Not on file     Emotionally abused: Not on file     Physically abused: Not on file     Forced sexual activity: Not on file   Other Topics Concern    Not on file   Social History Narrative    Not on file     Family History   Problem Relation Age of Onset    Colon Cancer Father     Cancer Father 77        colon cancer    Breast Cancer Mother     Cancer Mother         lung    Other Mother         mother passed away from low platelettes    Heart Attack Mother     Other Brother         hemiplegic from sepsis of spinal cord    Other Brother         MVA    Breast Cancer Maternal Aunt     Breast Cancer Maternal Aunt        Review of Systems:     Skin: (-) rash,(-) psoriasis,(-) eczema, (-)skin cancer. Musculoskeletal: (-) fractures,  (-) dislocations,(-) collagen vascular disease, (-) fibromyalgia, (-) multiple sclerosis, (-) muscular dystrophy, (-) RSD,(-) joint pain (-)swelling, (-) joint pain,swelling. Neurologic: (-) epilepsy, (-)seizures,(-) brain tumor,(-) TIA, (-)stroke, (-)headaches, (-)Parkinson disease,(-) memory loss, (-) LOC. Cardiovascular: (-) Chest pain, (-) swelling in legs/feet, (-) SOB, (-) cramping in legs/feet with walking. Constitutional:  The patient is alert and oriented x 3, appears to be stated age and in no distress. Temp 98 °F (36.7 °C)   Ht 5' 6\" (1.676 m)   Wt 216 lb (98 kg)   BMI 34.86 kg/m²     Skin:  Upon inspection: the skin appears warm, dry and intact. There is not a previous scar over the affected area. There is not any cellulitis, lymphedema or cutaneous lesions noted in the lower extremities. Upon palpation there is no induration noted. Neurologic:  Gait: normal;  Motor exam of the lower extremities show ; quadriceps, hamstrings, foot dorsi and plantar flexors intact R.  5/5 and L. 5/5. Deep tendon reflexes are 2/4 at the knees and 2/4 at the ankles with strong extensor hallicus longus motor strength bilaterally. Sensory to both feet is intact to all sensory roots. Cardiovascular: The vascular exam is normal and is well perfused to distal extremities. Distal pulses DP/PT: R. 2+; L. 2+. There is cap refill noted less than two seconds in all digits. There is not edema of the bilateral lower extremities. There is not varicosities noted in the distal extremities. Lymph:  Upon palpation,  there is no lymphadenopathy noted in bilateral lower extremities. Musculoskeletal:  Gait: antalgic; examination of the nails and digits reveal no cyanosis or clubbing    Lumbar exam:  On visual inspection, there is no deformity of the spine. full range of motion, no tenderness, palpable spasm or pain on motion. Special tests: Straight Leg Raise negative, Favian testnegative. Hip exam:  Upon inspection, there is no deformity noted. Upon palpation there is not tenderness. ROM: is   full and semetrical.   Strength: Hip Flexors 5/5; Hip Abductors 5/5; Hip Adduction 5/5. Knee exam:  Right knee exam shows;  range of motion of R. Knee is 0 to 110, and L. Knee is 0 to 120. She does have  pain on motion, effusion is mild, there is tenderness over the  medial region, there are not any masses, there is not ligamentous instability, there is not  deformity noted. Knee exam: right positive for moderate crepitations, some mild tenderness laxity is not noted with  stress.       R. Knee:  Lachman's negative, Anterior Drawer negative, Posterior Drawer negative  Kendrick's positive, Thallasy  positive,   PF grind test negative, Apprehension test negative, Patellar J sign  negative  L. Knee:  Lachman's negative, Anterior Drawer negative, Posterior Drawer negative  Kendrick's negative, Thallasy  negative,   PF grind test negative, Apprehension test negative,  Patellar J sign  negative    Shoulder Exam:  On evaluation of her bilaterally upper extremities, her right shoulder has no deformity. There is tenderness upon palpation of the lateral shoulder. There is not evidence of scapular dyskinesis. There is not muscle atrophy in shoulder girdle. The range of motion for the Right Shoulder is 140/40/t10 and for the Left shoulder is 150/45/t. Right shoulder Motor strength is 8/5 in the supraspinatus, 5/5 internal rotation and 5/5 in external rotation, and Left shoulder motor strength 5/5 in supraspinatus, 5/5 in internal rotation, 5/5 in external rotation. Right shoulder:  positive Impingement , positive Nicole ,negative  Speeds,negative  Apprehension ,negative Diaz Load Shift, negative Nasra manuver, negative Cross arm test.     Left shoulder:  negative Impingement , negative Nicole ,negative  Speeds,negative  Apprehension ,negative Diaz Load Shift, negative Nasra manuver, negative Cross arm test.       Xrays:   1. There is no fracture dislocation of the right shoulder    2. Osseous focus inseparable from the a chromium seen overlying the right    humeral head at the 12 o'clock position favored to represent downsloping of    the acromion.  If there is clinical indication of impingement syndrome MRI    can be obtained if clinically warranted.               MRI:    n/a  Radiographic findings reviewed with patient    Impression:  Encounter Diagnoses   Name Primary?     Primary osteoarthritis of right knee Yes    Shoulder impingement, left        Plan:   Natural history and expected

## 2020-11-03 PROBLEM — M47.816 LUMBAR SPONDYLOSIS: Status: RESOLVED | Noted: 2018-11-15 | Resolved: 2020-11-03
